# Patient Record
Sex: FEMALE | Race: WHITE | NOT HISPANIC OR LATINO | Employment: FULL TIME | URBAN - METROPOLITAN AREA
[De-identification: names, ages, dates, MRNs, and addresses within clinical notes are randomized per-mention and may not be internally consistent; named-entity substitution may affect disease eponyms.]

---

## 2017-04-10 ENCOUNTER — TRANSCRIBE ORDERS (OUTPATIENT)
Dept: ADMINISTRATIVE | Facility: HOSPITAL | Age: 47
End: 2017-04-10

## 2017-04-10 DIAGNOSIS — Z12.31 ENCOUNTER FOR MAMMOGRAM TO ESTABLISH BASELINE MAMMOGRAM: Primary | ICD-10-CM

## 2017-04-20 ENCOUNTER — HOSPITAL ENCOUNTER (OUTPATIENT)
Dept: RADIOLOGY | Facility: HOSPITAL | Age: 47
Discharge: HOME | End: 2017-04-20
Payer: COMMERCIAL

## 2017-04-20 DIAGNOSIS — Z12.31 ENCOUNTER FOR MAMMOGRAM TO ESTABLISH BASELINE MAMMOGRAM: ICD-10-CM

## 2017-04-20 PROCEDURE — G0202 SCR MAMMO BI INCL CAD: HCPCS

## 2018-10-23 ENCOUNTER — TRANSCRIBE ORDERS (OUTPATIENT)
Dept: ADMINISTRATIVE | Facility: HOSPITAL | Age: 48
End: 2018-10-23

## 2018-10-23 DIAGNOSIS — Z12.39 SCREENING BREAST EXAMINATION: Primary | ICD-10-CM

## 2018-11-01 ENCOUNTER — HOSPITAL ENCOUNTER (OUTPATIENT)
Dept: RADIOLOGY | Facility: HOSPITAL | Age: 48
Discharge: HOME | End: 2018-11-01
Payer: COMMERCIAL

## 2018-11-01 DIAGNOSIS — Z12.39 SCREENING BREAST EXAMINATION: ICD-10-CM

## 2018-11-01 PROCEDURE — 77067 SCR MAMMO BI INCL CAD: CPT

## 2018-11-01 PROCEDURE — 77063 BREAST TOMOSYNTHESIS BI: CPT

## 2019-08-25 ENCOUNTER — APPOINTMENT (EMERGENCY)
Dept: RADIOLOGY | Facility: HOSPITAL | Age: 49
End: 2019-08-25
Attending: EMERGENCY MEDICINE
Payer: COMMERCIAL

## 2019-08-25 ENCOUNTER — APPOINTMENT (EMERGENCY)
Dept: RADIOLOGY | Facility: HOSPITAL | Age: 49
End: 2019-08-25
Payer: COMMERCIAL

## 2019-08-25 ENCOUNTER — HOSPITAL ENCOUNTER (EMERGENCY)
Facility: HOSPITAL | Age: 49
Discharge: HOME | End: 2019-08-25
Attending: EMERGENCY MEDICINE | Admitting: EMERGENCY MEDICINE
Payer: COMMERCIAL

## 2019-08-25 VITALS
RESPIRATION RATE: 18 BRPM | BODY MASS INDEX: 23.95 KG/M2 | HEIGHT: 66 IN | DIASTOLIC BLOOD PRESSURE: 65 MMHG | WEIGHT: 149 LBS | OXYGEN SATURATION: 98 % | TEMPERATURE: 98.7 F | HEART RATE: 100 BPM | SYSTOLIC BLOOD PRESSURE: 143 MMHG

## 2019-08-25 DIAGNOSIS — R10.9 ABDOMINAL WALL PAIN: ICD-10-CM
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
## 2019-08-25 DIAGNOSIS — R07.89 CHEST WALL PAIN: ICD-10-CM

## 2019-08-25 LAB
ALBUMIN SERPL BCP-MCNC: 3.8 G/DL (ref 3.5–5)
ALP SERPL-CCNC: 92 U/L (ref 46–116)
ALT SERPL W P-5'-P-CCNC: 29 U/L (ref 12–78)
ANION GAP SERPL CALCULATED.3IONS-SCNC: 8 MMOL/L (ref 4–13)
APTT PPP: 24 SECONDS (ref 24–33)
AST SERPL W P-5'-P-CCNC: 26 U/L (ref 5–45)
BASOPHILS # BLD AUTO: 0.08 THOUSANDS/ΜL (ref 0–0.1)
BASOPHILS NFR BLD AUTO: 1 % (ref 0–1)
BILIRUB SERPL-MCNC: 0.2 MG/DL (ref 0.2–1)
BUN SERPL-MCNC: 17 MG/DL (ref 5–25)
CALCIUM SERPL-MCNC: 9 MG/DL (ref 8.3–10.1)
CHLORIDE SERPL-SCNC: 102 MMOL/L (ref 100–108)
CO2 SERPL-SCNC: 27 MMOL/L (ref 21–32)
CREAT SERPL-MCNC: 0.71 MG/DL (ref 0.6–1.3)
EOSINOPHIL # BLD AUTO: 0.34 THOUSAND/ΜL (ref 0–0.61)
EOSINOPHIL NFR BLD AUTO: 4 % (ref 0–6)
ERYTHROCYTE [DISTWIDTH] IN BLOOD BY AUTOMATED COUNT: 17 % (ref 11.6–15.1)
GFR SERPL CREATININE-BSD FRML MDRD: 101 ML/MIN/1.73SQ M
GLUCOSE SERPL-MCNC: 99 MG/DL (ref 65–140)
HCT VFR BLD AUTO: 38.5 % (ref 34.8–46.1)
HGB BLD-MCNC: 12 G/DL (ref 11.5–15.4)
IMM GRANULOCYTES # BLD AUTO: 0.03 THOUSAND/UL (ref 0–0.2)
IMM GRANULOCYTES NFR BLD AUTO: 0 % (ref 0–2)
INR PPP: 0.92 (ref 0.86–1.16)
LYMPHOCYTES # BLD AUTO: 3.02 THOUSANDS/ΜL (ref 0.6–4.47)
LYMPHOCYTES NFR BLD AUTO: 32 % (ref 14–44)
MCH RBC QN AUTO: 26.8 PG (ref 26.8–34.3)
MCHC RBC AUTO-ENTMCNC: 31.2 G/DL (ref 31.4–37.4)
MCV RBC AUTO: 86 FL (ref 82–98)
MONOCYTES # BLD AUTO: 0.44 THOUSAND/ΜL (ref 0.17–1.22)
MONOCYTES NFR BLD AUTO: 5 % (ref 4–12)
NEUTROPHILS # BLD AUTO: 5.55 THOUSANDS/ΜL (ref 1.85–7.62)
NEUTS SEG NFR BLD AUTO: 58 % (ref 43–75)
NRBC BLD AUTO-RTO: 0 /100 WBCS
PLATELET # BLD AUTO: 328 THOUSANDS/UL (ref 149–390)
PMV BLD AUTO: 11.2 FL (ref 8.9–12.7)
POTASSIUM SERPL-SCNC: 3.9 MMOL/L (ref 3.5–5.3)
PROT SERPL-MCNC: 7.3 G/DL (ref 6.4–8.2)
PROTHROMBIN TIME: 9.7 SECONDS (ref 9.4–11.7)
RBC # BLD AUTO: 4.48 MILLION/UL (ref 3.81–5.12)
SODIUM SERPL-SCNC: 137 MMOL/L (ref 136–145)
TROPONIN I SERPL-MCNC: <0.02 NG/ML
WBC # BLD AUTO: 9.46 THOUSAND/UL (ref 4.31–10.16)

## 2019-08-25 PROCEDURE — 36415 COLL VENOUS BLD VENIPUNCTURE: CPT | Performed by: EMERGENCY MEDICINE

## 2019-08-25 PROCEDURE — 85730 THROMBOPLASTIN TIME PARTIAL: CPT | Performed by: EMERGENCY MEDICINE

## 2019-08-25 PROCEDURE — 73080 X-RAY EXAM OF ELBOW: CPT

## 2019-08-25 PROCEDURE — 85025 COMPLETE CBC W/AUTO DIFF WBC: CPT | Performed by: EMERGENCY MEDICINE

## 2019-08-25 PROCEDURE — 80053 COMPREHEN METABOLIC PANEL: CPT | Performed by: EMERGENCY MEDICINE

## 2019-08-25 PROCEDURE — 74177 CT ABD & PELVIS W/CONTRAST: CPT

## 2019-08-25 PROCEDURE — 96374 THER/PROPH/DIAG INJ IV PUSH: CPT

## 2019-08-25 PROCEDURE — 93005 ELECTROCARDIOGRAM TRACING: CPT

## 2019-08-25 PROCEDURE — 99285 EMERGENCY DEPT VISIT HI MDM: CPT

## 2019-08-25 PROCEDURE — 85610 PROTHROMBIN TIME: CPT | Performed by: EMERGENCY MEDICINE

## 2019-08-25 PROCEDURE — 96375 TX/PRO/DX INJ NEW DRUG ADDON: CPT

## 2019-08-25 PROCEDURE — 84484 ASSAY OF TROPONIN QUANT: CPT | Performed by: EMERGENCY MEDICINE

## 2019-08-25 PROCEDURE — 71260 CT THORAX DX C+: CPT

## 2019-08-25 RX ORDER — NAPROXEN 500 MG/1
500 TABLET ORAL 2 TIMES DAILY WITH MEALS
Qty: 10 TABLET | Refills: 0 | Status: SHIPPED | OUTPATIENT
Start: 2019-08-25 | End: 2019-09-02

## 2019-08-25 RX ORDER — KETOROLAC TROMETHAMINE 30 MG/ML
30 INJECTION, SOLUTION INTRAMUSCULAR; INTRAVENOUS ONCE
Status: COMPLETED | OUTPATIENT
Start: 2019-08-25 | End: 2019-08-25

## 2019-08-25 RX ORDER — CYCLOBENZAPRINE HCL 10 MG
10 TABLET ORAL ONCE
Status: COMPLETED | OUTPATIENT
Start: 2019-08-25 | End: 2019-08-25

## 2019-08-25 RX ORDER — CYCLOBENZAPRINE HCL 10 MG
10 TABLET ORAL 2 TIMES DAILY PRN
Qty: 10 TABLET | Refills: 0 | Status: SHIPPED | OUTPATIENT
Start: 2019-08-25

## 2019-08-25 RX ORDER — ONDANSETRON 2 MG/ML
4 INJECTION INTRAMUSCULAR; INTRAVENOUS ONCE
Status: COMPLETED | OUTPATIENT
Start: 2019-08-25 | End: 2019-08-25

## 2019-08-25 RX ADMIN — MORPHINE SULFATE 2 MG: 2 INJECTION, SOLUTION INTRAMUSCULAR; INTRAVENOUS at 20:21

## 2019-08-25 RX ADMIN — KETOROLAC TROMETHAMINE 30 MG: 30 INJECTION, SOLUTION INTRAMUSCULAR at 21:28

## 2019-08-25 RX ADMIN — ONDANSETRON 4 MG: 2 INJECTION INTRAMUSCULAR; INTRAVENOUS at 20:47

## 2019-08-25 RX ADMIN — IOHEXOL 100 ML: 350 INJECTION, SOLUTION INTRAVENOUS at 20:40

## 2019-08-25 RX ADMIN — CYCLOBENZAPRINE HYDROCHLORIDE 10 MG: 10 TABLET, FILM COATED ORAL at 21:29
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
## 2019-08-26 LAB
ATRIAL RATE: 94 BPM
P AXIS: 57 DEGREES
PR INTERVAL: 118 MS
QRS AXIS: 78 DEGREES
QRSD INTERVAL: 70 MS
QT INTERVAL: 340 MS
QTC INTERVAL: 425 MS
T WAVE AXIS: 74 DEGREES
VENTRICULAR RATE: 94 BPM

## 2019-08-26 PROCEDURE — 93010 ELECTROCARDIOGRAM REPORT: CPT | Performed by: INTERNAL MEDICINE

## 2019-08-26 NOTE — ED NOTES
Patient transported to CT  Will give Zofran when patient returns from scan       Charlie Monique RN  08/25/19 2024

## 2019-09-02 ENCOUNTER — APPOINTMENT (EMERGENCY)
Dept: RADIOLOGY | Facility: HOSPITAL | Age: 49
End: 2019-09-02
Payer: COMMERCIAL

## 2019-09-02 ENCOUNTER — HOSPITAL ENCOUNTER (EMERGENCY)
Facility: HOSPITAL | Age: 49
Discharge: HOME | End: 2019-09-02
Attending: EMERGENCY MEDICINE | Admitting: EMERGENCY MEDICINE
Payer: COMMERCIAL

## 2019-09-02 VITALS
RESPIRATION RATE: 18 BRPM | TEMPERATURE: 98.1 F | OXYGEN SATURATION: 100 % | SYSTOLIC BLOOD PRESSURE: 104 MMHG | HEART RATE: 71 BPM | WEIGHT: 161 LBS | BODY MASS INDEX: 25.99 KG/M2 | DIASTOLIC BLOOD PRESSURE: 72 MMHG

## 2019-09-02 DIAGNOSIS — R42 LIGHTHEADEDNESS: Primary | ICD-10-CM

## 2019-09-02 DIAGNOSIS — S30.1XXA CONTUSION OF ABDOMINAL WALL, INITIAL ENCOUNTER: ICD-10-CM

## 2019-09-02 DIAGNOSIS — S06.0X9A CONCUSSION: ICD-10-CM

## 2019-09-02 LAB
ALBUMIN SERPL BCP-MCNC: 3.4 G/DL (ref 3.5–5)
ALP SERPL-CCNC: 81 U/L (ref 46–116)
ALT SERPL W P-5'-P-CCNC: 18 U/L (ref 12–78)
ANION GAP SERPL CALCULATED.3IONS-SCNC: 7 MMOL/L (ref 4–13)
APTT PPP: 25 SECONDS (ref 23–37)
AST SERPL W P-5'-P-CCNC: 15 U/L (ref 5–45)
BASOPHILS # BLD AUTO: 0.08 THOUSANDS/ΜL (ref 0–0.1)
BASOPHILS NFR BLD AUTO: 1 % (ref 0–1)
BILIRUB SERPL-MCNC: 0.2 MG/DL (ref 0.2–1)
BUN SERPL-MCNC: 14 MG/DL (ref 5–25)
CALCIUM SERPL-MCNC: 8.5 MG/DL (ref 8.3–10.1)
CHLORIDE SERPL-SCNC: 106 MMOL/L (ref 100–108)
CO2 SERPL-SCNC: 28 MMOL/L (ref 21–32)
CREAT SERPL-MCNC: 0.74 MG/DL (ref 0.6–1.3)
EOSINOPHIL # BLD AUTO: 0.43 THOUSAND/ΜL (ref 0–0.61)
EOSINOPHIL NFR BLD AUTO: 7 % (ref 0–6)
ERYTHROCYTE [DISTWIDTH] IN BLOOD BY AUTOMATED COUNT: 16.6 % (ref 11.6–15.1)
GFR SERPL CREATININE-BSD FRML MDRD: 96 ML/MIN/1.73SQ M
GLUCOSE SERPL-MCNC: 96 MG/DL (ref 65–140)
HCT VFR BLD AUTO: 34.6 % (ref 34.8–46.1)
HGB BLD-MCNC: 10.5 G/DL (ref 11.5–15.4)
IMM GRANULOCYTES # BLD AUTO: 0.02 THOUSAND/UL (ref 0–0.2)
IMM GRANULOCYTES NFR BLD AUTO: 0 % (ref 0–2)
INR PPP: 0.92 (ref 0.91–1.09)
LIPASE SERPL-CCNC: 242 U/L (ref 73–393)
LYMPHOCYTES # BLD AUTO: 2 THOUSANDS/ΜL (ref 0.6–4.47)
LYMPHOCYTES NFR BLD AUTO: 34 % (ref 14–44)
MCH RBC QN AUTO: 26.6 PG (ref 26.8–34.3)
MCHC RBC AUTO-ENTMCNC: 30.3 G/DL (ref 31.4–37.4)
MCV RBC AUTO: 88 FL (ref 82–98)
MONOCYTES # BLD AUTO: 0.31 THOUSAND/ΜL (ref 0.17–1.22)
MONOCYTES NFR BLD AUTO: 5 % (ref 4–12)
NEUTROPHILS # BLD AUTO: 2.98 THOUSANDS/ΜL (ref 1.85–7.62)
NEUTS SEG NFR BLD AUTO: 53 % (ref 43–75)
NRBC BLD AUTO-RTO: 0 /100 WBCS
PLATELET # BLD AUTO: 278 THOUSANDS/UL (ref 149–390)
PMV BLD AUTO: 10.2 FL (ref 8.9–12.7)
POTASSIUM SERPL-SCNC: 4.6 MMOL/L (ref 3.5–5.3)
PROT SERPL-MCNC: 6.5 G/DL (ref 6.4–8.2)
PROTHROMBIN TIME: 9.9 SECONDS (ref 9.8–12)
RBC # BLD AUTO: 3.95 MILLION/UL (ref 3.81–5.12)
SODIUM SERPL-SCNC: 141 MMOL/L (ref 136–145)
WBC # BLD AUTO: 5.82 THOUSAND/UL (ref 4.31–10.16)

## 2019-09-02 PROCEDURE — 85610 PROTHROMBIN TIME: CPT | Performed by: EMERGENCY MEDICINE

## 2019-09-02 PROCEDURE — 96374 THER/PROPH/DIAG INJ IV PUSH: CPT

## 2019-09-02 PROCEDURE — 74177 CT ABD & PELVIS W/CONTRAST: CPT

## 2019-09-02 PROCEDURE — 70498 CT ANGIOGRAPHY NECK: CPT

## 2019-09-02 PROCEDURE — 36415 COLL VENOUS BLD VENIPUNCTURE: CPT | Performed by: EMERGENCY MEDICINE

## 2019-09-02 PROCEDURE — 96361 HYDRATE IV INFUSION ADD-ON: CPT

## 2019-09-02 PROCEDURE — 85025 COMPLETE CBC W/AUTO DIFF WBC: CPT | Performed by: EMERGENCY MEDICINE

## 2019-09-02 PROCEDURE — 70496 CT ANGIOGRAPHY HEAD: CPT

## 2019-09-02 PROCEDURE — 83690 ASSAY OF LIPASE: CPT | Performed by: EMERGENCY MEDICINE

## 2019-09-02 PROCEDURE — 85730 THROMBOPLASTIN TIME PARTIAL: CPT | Performed by: EMERGENCY MEDICINE

## 2019-09-02 PROCEDURE — 99284 EMERGENCY DEPT VISIT MOD MDM: CPT

## 2019-09-02 PROCEDURE — 80053 COMPREHEN METABOLIC PANEL: CPT | Performed by: EMERGENCY MEDICINE

## 2019-09-02 RX ORDER — ONDANSETRON 4 MG/1
4 TABLET, FILM COATED ORAL EVERY 6 HOURS
Qty: 9 TABLET | Refills: 0 | Status: SHIPPED | OUTPATIENT
Start: 2019-09-02 | End: 2019-09-05

## 2019-09-02 RX ORDER — HYDROCODONE BITARTRATE AND ACETAMINOPHEN 5; 325 MG/1; MG/1
1 TABLET ORAL EVERY 6 HOURS PRN
Qty: 12 TABLET | Refills: 0 | Status: SHIPPED | OUTPATIENT
Start: 2019-09-02 | End: 2019-09-05

## 2019-09-02 RX ORDER — MORPHINE SULFATE 4 MG/ML
4 INJECTION, SOLUTION INTRAMUSCULAR; INTRAVENOUS ONCE
Status: COMPLETED | OUTPATIENT
Start: 2019-09-02 | End: 2019-09-02

## 2019-09-02 RX ADMIN — IOHEXOL 100 ML: 350 INJECTION, SOLUTION INTRAVENOUS at 12:32

## 2019-09-02 RX ADMIN — SODIUM CHLORIDE 1000 ML: 0.9 INJECTION, SOLUTION INTRAVENOUS at 13:23

## 2019-09-02 RX ADMIN — MORPHINE SULFATE 4 MG: 4 INJECTION INTRAVENOUS at 12:01
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
## 2019-09-02 NOTE — ED NOTES
Pt awake, alert and oriented, ambulatory with steady gait, advised to change positions slowly to prevent lightheadedness     Matty Hernandez, DAQUAN  09/02/19 6544
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
## 2019-09-02 NOTE — DISCHARGE INSTRUCTIONS
Please to not return to full physical activity until your lightheadedness headache nausea have completely resolved  Please apply ice packs to the affected areas of contusion on your abdomen followed by warm compresses  Please do not lift anything heavy over 5 lb to give you abdominal wall muscle some rest   Please get a repeat CBC in 2 days to make sure your hemoglobin is not dropping  Please follow up with the of family doctor in a week  Please do not drive or operate heavy machinery while on Vicodin and do not take any more Tylenol as Vicodin already has Tylenol in it  Please alternate Vicodin and Flexeril

## 2019-09-02 NOTE — ED NOTES
Dr Kj Martines at bedside, results discussed with pt and family     Eliu Soto, DAQUAN  09/02/19 9794

## 2019-11-24 ENCOUNTER — OFFICE VISIT (OUTPATIENT)
Dept: URGENT CARE | Facility: CLINIC | Age: 49
End: 2019-11-24
Payer: COMMERCIAL

## 2019-11-24 VITALS
WEIGHT: 155 LBS | HEART RATE: 74 BPM | BODY MASS INDEX: 25.02 KG/M2 | DIASTOLIC BLOOD PRESSURE: 68 MMHG | OXYGEN SATURATION: 100 % | RESPIRATION RATE: 14 BRPM | SYSTOLIC BLOOD PRESSURE: 104 MMHG | TEMPERATURE: 97.6 F

## 2019-11-24 DIAGNOSIS — R22.0 FACIAL SWELLING: ICD-10-CM

## 2019-11-24 DIAGNOSIS — T78.40XA ALLERGIC REACTION, INITIAL ENCOUNTER: Primary | ICD-10-CM

## 2019-11-24 PROCEDURE — 99203 OFFICE O/P NEW LOW 30 MIN: CPT | Performed by: NURSE PRACTITIONER

## 2019-11-24 RX ORDER — PREDNISONE 20 MG/1
20 TABLET ORAL 2 TIMES DAILY WITH MEALS
Qty: 10 TABLET | Refills: 0 | Status: SHIPPED | OUTPATIENT
Start: 2019-11-24 | End: 2019-11-29

## 2019-11-24 RX ORDER — METHYLPREDNISOLONE SODIUM SUCCINATE 40 MG/ML
40 INJECTION, POWDER, LYOPHILIZED, FOR SOLUTION INTRAMUSCULAR; INTRAVENOUS ONCE
Status: COMPLETED | OUTPATIENT
Start: 2019-11-24 | End: 2019-11-24

## 2019-11-24 RX ADMIN — METHYLPREDNISOLONE SODIUM SUCCINATE 40 MG: 40 INJECTION, POWDER, LYOPHILIZED, FOR SOLUTION INTRAMUSCULAR; INTRAVENOUS at 10:41

## 2019-11-24 NOTE — PROGRESS NOTES
[facility]        NAME: Marilee Hagen is a 50 y o  female  : 1970    MRN: [id number]  DATE: 2019  TIME: 10:28 AM    Assessment and Plan     1  Allergic reaction, initial encounter  - methylPREDNISolone sodium succinate (Solu-MEDROL) injection 40 mg  - predniSONE 20 mg tablet; Take 1 tablet (20 mg total) by mouth 2 (two) times a day with meals for 5 days  Dispense: 10 tablet; Refill: 0    2  Facial swelling  - methylPREDNISolone sodium succinate (Solu-MEDROL) injection 40 mg  - predniSONE 20 mg tablet; Take 1 tablet (20 mg total) by mouth 2 (two) times a day with meals for 5 days  Dispense: 10 tablet; Refill: 0        Patient Instructions     You were given Solumedrol 40mg today in the clinic  Tomorrow, start Prednisone 20mg twice daily with food for next 5 days  Pepcid 20mg (over the counter) daily for 5 days  Benadryl as needed  Do not drive or operate machinery while taking this medication  Over the counter Claritin or Zyrtec daily for next 5 days  If you develop shortness of breath, chest tightness, wheeze, or throat closing sensation- go immediately to Emergency room  Follow up with PCP in 3-5 days        Chief Complaint     Chief Complaint   Patient presents with    Swelling     facial swelling that started on Friday, started with her lips, took claritan with some relief; tried CBD oil around eyes, which caused swelling to worsen; took a claritin some releif; swelling of unknown etiology         History of Present Illness       Here for facial swelling  3 days  Getting worse  Unsure what she is having a reaction to  Is allergic to ginger and does not know if something she ate had ginger in it  Lips swollen and itchy  Entire face is swollen but right side is worse  Right eye swollen  No discharge from eye  No sob  No chest tightness  No wheeze  Took claritin and it helped a little  No new meds  No new skin products           Review of Systems   Review of Systems   HENT: Positive for facial swelling  Negative for congestion  Eyes: Negative for discharge, redness and visual disturbance  Right eyelid and under eye swollen   Respiratory: Negative for chest tightness, shortness of breath and wheezing  Cardiovascular: Negative for chest pain and palpitations  Allergic/Immunologic: Positive for food allergies  Neurological: Negative for dizziness and headaches  Current Medications       Current Outpatient Medications:     cyclobenzaprine (FLEXERIL) 10 mg tablet, Take 1 tablet (10 mg total) by mouth 2 (two) times a day as needed for muscle spasms (Patient not taking: Reported on 11/24/2019), Disp: 10 tablet, Rfl: 0    ondansetron (ZOFRAN) 4 mg tablet, Take 1 tablet (4 mg total) by mouth every 6 (six) hours for 3 days, Disp: 9 tablet, Rfl: 0    Current Allergies     Allergies as of 11/24/2019    (No Known Allergies)            The following portions of the patient's history were reviewed and updated as appropriate: allergies, current medications, past family history, past medical history, past social history, past surgical history and problem list      Past Medical History:   Diagnosis Date    Patient denies medical problems        Past Surgical History:   Procedure Laterality Date    CHOLECYSTECTOMY      TONSILLECTOMY         Family History   Adopted: Yes         Medications have been verified  Objective   /68   Pulse 74   Temp 97 6 °F (36 4 °C)   Resp 14   Wt 70 3 kg (155 lb)   LMP 11/18/2019   SpO2 100%   BMI 25 02 kg/m²        Physical Exam     Physical Exam   Constitutional: She appears well-developed and well-nourished  HENT:   Mouth/Throat: Oropharynx is clear and moist    Mild erythema to face, worse on right  Lips with mild erythema and scant edema  Eyes:   Right meagan-orbital edema  Cardiovascular: Normal rate  Pulmonary/Chest: Effort normal and breath sounds normal  She has no wheezes  Neurological: She is alert  Skin: Skin is warm  There is erythema

## 2019-11-24 NOTE — PATIENT INSTRUCTIONS
You were given Solumedrol 40mg today in the clinic  Tomorrow, start Prednisone 20mg twice daily with food for next 5 days  Pepcid 20mg (over the counter) daily for 5 days  Benadryl as needed  Do not drive or operate machinery while taking this medication  Over the counter Claritin or Zyrtec daily for next 5 days  If you develop shortness of breath, chest tightness, wheeze, or throat closing sensation- go immediately to Emergency room     Follow up with PCP in 3-5 days

## 2020-01-11 ENCOUNTER — APPOINTMENT (EMERGENCY)
Dept: RADIOLOGY | Facility: HOSPITAL | Age: 50
End: 2020-01-11
Payer: COMMERCIAL

## 2020-01-11 ENCOUNTER — HOSPITAL ENCOUNTER (EMERGENCY)
Facility: HOSPITAL | Age: 50
Discharge: HOME | End: 2020-01-11
Attending: EMERGENCY MEDICINE | Admitting: EMERGENCY MEDICINE
Payer: COMMERCIAL

## 2020-01-11 VITALS
SYSTOLIC BLOOD PRESSURE: 111 MMHG | TEMPERATURE: 98.3 F | RESPIRATION RATE: 18 BRPM | HEART RATE: 70 BPM | WEIGHT: 148 LBS | BODY MASS INDEX: 23.78 KG/M2 | HEIGHT: 66 IN | OXYGEN SATURATION: 99 % | DIASTOLIC BLOOD PRESSURE: 54 MMHG

## 2020-01-11 DIAGNOSIS — N94.6 DYSMENORRHEA: ICD-10-CM

## 2020-01-11 LAB
ALBUMIN SERPL BCP-MCNC: 4.1 G/DL (ref 3.5–5)
ALP SERPL-CCNC: 81 U/L (ref 46–116)
ALT SERPL W P-5'-P-CCNC: 23 U/L (ref 12–78)
AMORPH URATE CRY URNS QL MICRO: ABNORMAL /HPF
ANION GAP SERPL CALCULATED.3IONS-SCNC: 9 MMOL/L (ref 4–13)
AST SERPL W P-5'-P-CCNC: 17 U/L (ref 5–45)
BACTERIA UR QL AUTO: ABNORMAL /HPF
BASOPHILS # BLD AUTO: 0.06 THOUSANDS/ΜL (ref 0–0.1)
BASOPHILS NFR BLD AUTO: 1 % (ref 0–1)
BILIRUB SERPL-MCNC: 0.2 MG/DL (ref 0.2–1)
BILIRUB UR QL STRIP: ABNORMAL
BUN SERPL-MCNC: 13 MG/DL (ref 5–25)
CALCIUM SERPL-MCNC: 8.2 MG/DL (ref 8.3–10.1)
CHLORIDE SERPL-SCNC: 107 MMOL/L (ref 100–108)
CLARITY UR: ABNORMAL
CO2 SERPL-SCNC: 25 MMOL/L (ref 21–32)
COLOR UR: YELLOW
CREAT SERPL-MCNC: 0.83 MG/DL (ref 0.6–1.3)
CRYSTALS URNS QL MICRO: ABNORMAL /HPF
EOSINOPHIL # BLD AUTO: 0.06 THOUSAND/ΜL (ref 0–0.61)
EOSINOPHIL NFR BLD AUTO: 1 % (ref 0–6)
ERYTHROCYTE [DISTWIDTH] IN BLOOD BY AUTOMATED COUNT: 16.8 % (ref 11.6–15.1)
EXT PREG TEST URINE: NEGATIVE
EXT. CONTROL ED NAV: NORMAL
GFR SERPL CREATININE-BSD FRML MDRD: 83 ML/MIN/1.73SQ M
GLUCOSE SERPL-MCNC: 98 MG/DL (ref 65–140)
GLUCOSE UR STRIP-MCNC: NEGATIVE MG/DL
HCT VFR BLD AUTO: 33.4 % (ref 34.8–46.1)
HGB BLD-MCNC: 10.2 G/DL (ref 11.5–15.4)
HGB UR QL STRIP.AUTO: ABNORMAL
IMM GRANULOCYTES # BLD AUTO: 0.03 THOUSAND/UL (ref 0–0.2)
IMM GRANULOCYTES NFR BLD AUTO: 0 % (ref 0–2)
KETONES UR STRIP-MCNC: ABNORMAL MG/DL
LEUKOCYTE ESTERASE UR QL STRIP: NEGATIVE
LYMPHOCYTES # BLD AUTO: 0.86 THOUSANDS/ΜL (ref 0.6–4.47)
LYMPHOCYTES NFR BLD AUTO: 10 % (ref 14–44)
MCH RBC QN AUTO: 25 PG (ref 26.8–34.3)
MCHC RBC AUTO-ENTMCNC: 30.5 G/DL (ref 31.4–37.4)
MCV RBC AUTO: 82 FL (ref 82–98)
MONOCYTES # BLD AUTO: 0.25 THOUSAND/ΜL (ref 0.17–1.22)
MONOCYTES NFR BLD AUTO: 3 % (ref 4–12)
MUCOUS THREADS UR QL AUTO: ABNORMAL
NEUTROPHILS # BLD AUTO: 7.37 THOUSANDS/ΜL (ref 1.85–7.62)
NEUTS SEG NFR BLD AUTO: 85 % (ref 43–75)
NITRITE UR QL STRIP: NEGATIVE
NON-SQ EPI CELLS URNS QL MICRO: ABNORMAL /HPF
NRBC BLD AUTO-RTO: 0 /100 WBCS
PH UR STRIP.AUTO: 6 [PH]
PLATELET # BLD AUTO: 298 THOUSANDS/UL (ref 149–390)
PMV BLD AUTO: 10.1 FL (ref 8.9–12.7)
POTASSIUM SERPL-SCNC: 4.7 MMOL/L (ref 3.5–5.3)
PROT SERPL-MCNC: 7.4 G/DL (ref 6.4–8.2)
PROT UR STRIP-MCNC: ABNORMAL MG/DL
RBC # BLD AUTO: 4.08 MILLION/UL (ref 3.81–5.12)
RBC #/AREA URNS AUTO: ABNORMAL /HPF
SODIUM SERPL-SCNC: 141 MMOL/L (ref 136–145)
SP GR UR STRIP.AUTO: 1.02 (ref 1–1.03)
UROBILINOGEN UR QL STRIP.AUTO: 0.2 E.U./DL
WBC # BLD AUTO: 8.63 THOUSAND/UL (ref 4.31–10.16)
WBC #/AREA URNS AUTO: ABNORMAL /HPF

## 2020-01-11 PROCEDURE — 96374 THER/PROPH/DIAG INJ IV PUSH: CPT

## 2020-01-11 PROCEDURE — 81001 URINALYSIS AUTO W/SCOPE: CPT | Performed by: EMERGENCY MEDICINE

## 2020-01-11 PROCEDURE — 74177 CT ABD & PELVIS W/CONTRAST: CPT

## 2020-01-11 PROCEDURE — 99284 EMERGENCY DEPT VISIT MOD MDM: CPT

## 2020-01-11 PROCEDURE — 80053 COMPREHEN METABOLIC PANEL: CPT | Performed by: EMERGENCY MEDICINE

## 2020-01-11 PROCEDURE — 85025 COMPLETE CBC W/AUTO DIFF WBC: CPT | Performed by: EMERGENCY MEDICINE

## 2020-01-11 PROCEDURE — 96361 HYDRATE IV INFUSION ADD-ON: CPT

## 2020-01-11 PROCEDURE — 81025 URINE PREGNANCY TEST: CPT | Performed by: EMERGENCY MEDICINE

## 2020-01-11 PROCEDURE — 36415 COLL VENOUS BLD VENIPUNCTURE: CPT | Performed by: EMERGENCY MEDICINE

## 2020-01-11 PROCEDURE — 99284 EMERGENCY DEPT VISIT MOD MDM: CPT | Performed by: EMERGENCY MEDICINE

## 2020-01-11 RX ORDER — KETOROLAC TROMETHAMINE 30 MG/ML
30 INJECTION, SOLUTION INTRAMUSCULAR; INTRAVENOUS ONCE
Status: COMPLETED | OUTPATIENT
Start: 2020-01-11 | End: 2020-01-11

## 2020-01-11 RX ORDER — IBUPROFEN 600 MG/1
600 TABLET ORAL EVERY 6 HOURS PRN
Qty: 30 TABLET | Refills: 0 | Status: SHIPPED | OUTPATIENT
Start: 2020-01-11

## 2020-01-11 RX ORDER — IBUPROFEN 600 MG/1
600 TABLET ORAL EVERY 6 HOURS PRN
Qty: 30 TABLET | Refills: 0 | Status: SHIPPED | OUTPATIENT
Start: 2020-01-11 | End: 2020-01-11 | Stop reason: SDUPTHER

## 2020-01-11 RX ADMIN — KETOROLAC TROMETHAMINE 30 MG: 30 INJECTION, SOLUTION INTRAMUSCULAR at 10:23

## 2020-01-11 RX ADMIN — IOHEXOL 100 ML: 350 INJECTION, SOLUTION INTRAVENOUS at 11:28

## 2020-01-11 RX ADMIN — SODIUM CHLORIDE 1000 ML: 0.9 INJECTION, SOLUTION INTRAVENOUS at 10:24

## 2020-01-11 NOTE — ED PROVIDER NOTES
History  Chief Complaint   Patient presents with    Menstrual Problem     C/O severe menstrual cramping starting at 6 am  Patient reports she got her period on time yesterday  Reports she normally has cramping "but never had them like this " Reports "My cycle has been regular my entire life "      Patient is a 49-year-old female who presents with complaint of starting her menstrual period last night and then having severe cramps throughout the night  Patient states this is her normal time for , she is not bleeding excessively, she has no other vaginal discharge  Patient states she took 200 mg of Motrin last night with no relief  Patient states she has had cramps in the past but never as severe as this  Patient states the pain was so bad she had vomited x1 last night there is no nausea now  Patient denies any dysuria, no frequency or urgency  Patient denies any aggravating or alleviating factors  Prior to Admission Medications   Prescriptions Last Dose Informant Patient Reported? Taking? cyclobenzaprine (FLEXERIL) 10 mg tablet   No No   Sig: Take 1 tablet (10 mg total) by mouth 2 (two) times a day as needed for muscle spasms   Patient not taking: Reported on 11/24/2019   ondansetron (ZOFRAN) 4 mg tablet   No No   Sig: Take 1 tablet (4 mg total) by mouth every 6 (six) hours for 3 days      Facility-Administered Medications: None       Past Medical History:   Diagnosis Date    Patient denies medical problems        Past Surgical History:   Procedure Laterality Date    CHOLECYSTECTOMY      TONSILLECTOMY         Family History   Adopted: Yes     I have reviewed and agree with the history as documented      Social History     Tobacco Use    Smoking status: Current Some Day Smoker    Smokeless tobacco: Never Used    Tobacco comment: 1 pack a week   Substance Use Topics    Alcohol use: Yes     Frequency: 2-3 times a week     Drinks per session: 3 or 4     Binge frequency: Less than monthly Comment: weekends    Drug use: Yes     Types: Marijuana     Comment: occasionally        Review of Systems   Constitutional: Negative for chills and fever  HENT: Negative for facial swelling and trouble swallowing  Respiratory: Negative for chest tightness and shortness of breath  Cardiovascular: Negative for chest pain and leg swelling  Gastrointestinal: Positive for abdominal pain, nausea and vomiting  Genitourinary: Positive for pelvic pain and vaginal bleeding  Negative for dysuria, flank pain, vaginal discharge and vaginal pain  Musculoskeletal: Negative for back pain and neck pain  Skin: Negative  Neurological: Negative for weakness and numbness  Hematological: Negative  Psychiatric/Behavioral: Negative  Physical Exam  Physical Exam   Constitutional: She is oriented to person, place, and time  She appears well-developed and well-nourished  HENT:   Head: Normocephalic and atraumatic  Neck: Normal range of motion  Cardiovascular: Normal rate and regular rhythm  Pulmonary/Chest: Effort normal and breath sounds normal    Abdominal: Soft  Normal appearance and bowel sounds are normal  There is tenderness in the periumbilical area  There is no rigidity, no rebound and no guarding  Musculoskeletal: Normal range of motion  Neurological: She is alert and oriented to person, place, and time  Skin: Skin is warm and dry  Psychiatric: She has a normal mood and affect  Nursing note and vitals reviewed        Vital Signs  ED Triage Vitals [01/11/20 0954]   Temperature Pulse Respirations Blood Pressure SpO2   (!) 97 °F (36 1 °C) 79 22 150/99 100 %      Temp Source Heart Rate Source Patient Position - Orthostatic VS BP Location FiO2 (%)   Oral Monitor Lying Right arm --      Pain Score       Worst Possible Pain           Vitals:    01/11/20 0954   BP: 150/99   Pulse: 79   Patient Position - Orthostatic VS: Lying         Visual Acuity      ED Medications  Medications   sodium chloride 0 9 % bolus 1,000 mL (1,000 mL Intravenous New Bag 1/11/20 1024)   ketorolac (TORADOL) injection 30 mg (30 mg Intravenous Given 1/11/20 1023)   iohexol (OMNIPAQUE) 350 MG/ML injection (MULTI-DOSE) 100 mL (100 mL Intravenous Given 1/11/20 1128)       Diagnostic Studies  Results Reviewed     Procedure Component Value Units Date/Time    Urine Microscopic [[id number]]  (Abnormal) Collected:  01/11/20 1014    Lab Status:  Final result Specimen:  Urine, Clean Catch Updated:  01/11/20 1059     RBC, UA None Seen /hpf      WBC, UA 0-1 /hpf      Epithelial Cells Occasional /hpf      Bacteria, UA       Field obscured, unable to enumerate     /hpf     AMORPH URATES Moderate /hpf      OTHER CRYSTALS Moderate /hpf      MUCUS THREADS Moderate    Narrative:       Unspecified crystals seen; less than 1ml of urine collected    Notified Jennie/BO    Comprehensive metabolic panel [[id number]]  (Abnormal) Collected:  01/11/20 1025    Lab Status:  Final result Specimen:  Blood from Arm, Left Updated:  01/11/20 1050     Sodium 141 mmol/L      Potassium 4 7 mmol/L      Chloride 107 mmol/L      CO2 25 mmol/L      ANION GAP 9 mmol/L      BUN 13 mg/dL      Creatinine 0 83 mg/dL      Glucose 98 mg/dL      Calcium 8 2 mg/dL      AST 17 U/L      ALT 23 U/L      Alkaline Phosphatase 81 U/L      Total Protein 7 4 g/dL      Albumin 4 1 g/dL      Total Bilirubin 0 20 mg/dL      eGFR 83 ml/min/1 73sq m     Narrative:       [place] guidelines for Chronic Kidney Disease (CKD):     Stage 1 with normal or high GFR (GFR > 90 mL/min/1 73 square meters)    Stage 2 Mild CKD (GFR = 60-89 mL/min/1 73 square meters)    Stage 3A Moderate CKD (GFR = 45-59 mL/min/1 73 square meters)    Stage 3B Moderate CKD (GFR = 30-44 mL/min/1 73 square meters)    Stage 4 Severe CKD (GFR = 15-29 mL/min/1 73 square meters)    Stage 5 End Stage CKD (GFR <15 mL/min/1 73 square meters)  Note: GFR calculation is accurate only with a steady state creatinine    CBC and differential [[id number]]  (Abnormal) Collected:  01/11/20 1025    Lab Status:  Final result Specimen:  Blood from Arm, Left Updated:  01/11/20 1032     WBC 8 63 Thousand/uL      RBC 4 08 Million/uL      Hemoglobin 10 2 g/dL      Hematocrit 33 4 %      MCV 82 fL      MCH 25 0 pg      MCHC 30 5 g/dL      RDW 16 8 %      MPV 10 1 fL      Platelets 285 Thousands/uL      nRBC 0 /100 WBCs      Neutrophils Relative 85 %      Immat GRANS % 0 %      Lymphocytes Relative 10 %      Monocytes Relative 3 %      Eosinophils Relative 1 %      Basophils Relative 1 %      Neutrophils Absolute 7 37 Thousands/µL      Immature Grans Absolute 0 03 Thousand/uL      Lymphocytes Absolute 0 86 Thousands/µL      Monocytes Absolute 0 25 Thousand/µL      Eosinophils Absolute 0 06 Thousand/µL      Basophils Absolute 0 06 Thousands/µL     UA (URINE) with reflex to Scope [[id number]]  (Abnormal) Collected:  01/11/20 1014    Lab Status:  Final result Specimen:  Urine, Clean Catch Updated:  01/11/20 1020     Color, UA Yellow     Clarity, UA Cloudy     Specific Gravity, UA 1 025     pH, UA 6 0     Leukocytes, UA Negative     Nitrite, UA Negative     Protein, UA Trace mg/dl      Glucose, UA Negative mg/dl      Ketones, UA Trace mg/dl      Urobilinogen, UA 0 2 E U /dl      Bilirubin, UA Interference- unable to analyze     Blood, UA Trace-Intact    POCT pregnancy, urine [[id number]]  (Normal) Resulted:  01/11/20 1012    Lab Status:  Final result Updated:  01/11/20 1012     EXT PREG TEST UR (Ref: Negative) negative     Control valid                 CT abdomen pelvis with contrast   Final Result by Clarisa Gruber MD (01/11 1141)   1  No acute intra-abdominal pathology                    Workstation performed: [id number]                    Procedures  Procedures         ED Course                               MDM  Number of Diagnoses or Management Options  Dysmenorrhea:   Diagnosis management comments: Patient vastly improved with Toradol  Her blood work and CT scan showed no abnormalities  I answered all questions best my ability, the patient is going to follow up with her OBGYN for further evaluation  The patient her  verbalized understanding and are in agreement with the assessment and plan  Amount and/or Complexity of Data Reviewed  Clinical lab tests: ordered and reviewed  Tests in the radiology section of CPT®: ordered and reviewed          Disposition  Final diagnoses:   Dysmenorrhea     Time reflects when diagnosis was documented in both MDM as applicable and the Disposition within this note     Time User Action Codes Description Comment    1/11/2020 12:03 PM Jenny Welch Add [N94 6] Dysmenorrhea     1/11/2020 12:03 PM Jenny Welch Modify [N94 6] Dysmenorrhea       ED Disposition     ED Disposition Condition Date/Time Comment    Discharge Stable Sat Jan 11, 2020 12:03 PM Sonam Beavers discharge to home/self care  Follow-up Information     Follow up With Specialties Details Why Contact Info    Alessandro Klein MD   As needed [facility]  [phone]            Patient's Medications   Discharge Prescriptions    IBUPROFEN (MOTRIN) 600 MG TABLET    Take 1 tablet (600 mg total) by mouth every 6 (six) hours as needed for mild pain       Start Date: 1/11/2020 End Date: --       Order Dose: 600 mg       Quantity: 30 tablet    Refills: 0     No discharge procedures on file      ED Provider  Electronically Signed by           James Alba MD  01/11/20 5155

## 2020-01-28 ENCOUNTER — TRANSCRIBE ORDERS (OUTPATIENT)
Dept: ADMINISTRATIVE | Facility: HOSPITAL | Age: 50
End: 2020-01-28

## 2020-01-28 DIAGNOSIS — Z12.31 VISIT FOR SCREENING MAMMOGRAM: Primary | ICD-10-CM

## 2020-03-23 ENCOUNTER — HOSPITAL ENCOUNTER (OUTPATIENT)
Dept: RADIOLOGY | Facility: HOSPITAL | Age: 50
Discharge: HOME | End: 2020-03-23
Payer: COMMERCIAL

## 2020-03-23 VITALS — WEIGHT: 148 LBS | BODY MASS INDEX: 23.78 KG/M2 | HEIGHT: 66 IN

## 2020-03-23 DIAGNOSIS — Z12.31 VISIT FOR SCREENING MAMMOGRAM: ICD-10-CM

## 2020-03-23 PROCEDURE — 77067 SCR MAMMO BI INCL CAD: CPT

## 2020-03-23 PROCEDURE — 77063 BREAST TOMOSYNTHESIS BI: CPT

## 2020-07-13 ENCOUNTER — HOSPITAL ENCOUNTER (EMERGENCY)
Facility: HOSPITAL | Age: 50
Discharge: HOME | End: 2020-07-13
Attending: EMERGENCY MEDICINE | Admitting: EMERGENCY MEDICINE
Payer: COMMERCIAL

## 2020-07-13 ENCOUNTER — APPOINTMENT (EMERGENCY)
Dept: RADIOLOGY | Facility: HOSPITAL | Age: 50
End: 2020-07-13
Payer: COMMERCIAL

## 2020-07-13 VITALS
SYSTOLIC BLOOD PRESSURE: 108 MMHG | WEIGHT: 155 LBS | HEART RATE: 64 BPM | TEMPERATURE: 97.6 F | RESPIRATION RATE: 16 BRPM | DIASTOLIC BLOOD PRESSURE: 70 MMHG | BODY MASS INDEX: 25.02 KG/M2 | OXYGEN SATURATION: 100 %

## 2020-07-13 DIAGNOSIS — T40.5X5A ADVERSE EFFECT OF COCAINE, INITIAL ENCOUNTER: ICD-10-CM

## 2020-07-13 DIAGNOSIS — R07.9 CHEST PAIN, UNSPECIFIED: Primary | ICD-10-CM

## 2020-07-13 LAB
ALBUMIN SERPL BCP-MCNC: 3.7 G/DL (ref 3.5–5)
ALP SERPL-CCNC: 89 U/L (ref 46–116)
ALT SERPL W P-5'-P-CCNC: 28 U/L (ref 12–78)
AMPHETAMINES SERPL QL SCN: NEGATIVE
ANION GAP SERPL CALCULATED.3IONS-SCNC: 6 MMOL/L (ref 4–13)
APTT PPP: 28 SECONDS (ref 23–37)
AST SERPL W P-5'-P-CCNC: 20 U/L (ref 5–45)
ATRIAL RATE: 63 BPM
BARBITURATES UR QL: NEGATIVE
BASOPHILS # BLD AUTO: 0.06 THOUSANDS/ΜL (ref 0–0.1)
BASOPHILS NFR BLD AUTO: 1 % (ref 0–1)
BENZODIAZ UR QL: NEGATIVE
BILIRUB SERPL-MCNC: 0.5 MG/DL (ref 0.2–1)
BUN SERPL-MCNC: 10 MG/DL (ref 5–25)
CALCIUM SERPL-MCNC: 8.2 MG/DL (ref 8.3–10.1)
CHLORIDE SERPL-SCNC: 104 MMOL/L (ref 100–108)
CO2 SERPL-SCNC: 29 MMOL/L (ref 21–32)
COCAINE UR QL: POSITIVE
CREAT SERPL-MCNC: 0.95 MG/DL (ref 0.6–1.3)
D DIMER PPP FEU-MCNC: 0.53 UG/ML FEU
EOSINOPHIL # BLD AUTO: 0.18 THOUSAND/ΜL (ref 0–0.61)
EOSINOPHIL NFR BLD AUTO: 4 % (ref 0–6)
ERYTHROCYTE [DISTWIDTH] IN BLOOD BY AUTOMATED COUNT: 15.7 % (ref 11.6–15.1)
EXT PREG TEST URINE: NORMAL
EXT. CONTROL ED NAV: NORMAL
GFR SERPL CREATININE-BSD FRML MDRD: 71 ML/MIN/1.73SQ M
GLUCOSE SERPL-MCNC: 98 MG/DL (ref 65–140)
HCT VFR BLD AUTO: 35.4 % (ref 34.8–46.1)
HGB BLD-MCNC: 11.2 G/DL (ref 11.5–15.4)
IMM GRANULOCYTES # BLD AUTO: 0.01 THOUSAND/UL (ref 0–0.2)
IMM GRANULOCYTES NFR BLD AUTO: 0 % (ref 0–2)
INR PPP: 0.96 (ref 0.84–1.19)
LIPASE SERPL-CCNC: 178 U/L (ref 73–393)
LYMPHOCYTES # BLD AUTO: 1.56 THOUSANDS/ΜL (ref 0.6–4.47)
LYMPHOCYTES NFR BLD AUTO: 33 % (ref 14–44)
MCH RBC QN AUTO: 27.7 PG (ref 26.8–34.3)
MCHC RBC AUTO-ENTMCNC: 31.6 G/DL (ref 31.4–37.4)
MCV RBC AUTO: 87 FL (ref 82–98)
METHADONE UR QL: NEGATIVE
MONOCYTES # BLD AUTO: 0.29 THOUSAND/ΜL (ref 0.17–1.22)
MONOCYTES NFR BLD AUTO: 6 % (ref 4–12)
NEUTROPHILS # BLD AUTO: 2.69 THOUSANDS/ΜL (ref 1.85–7.62)
NEUTS SEG NFR BLD AUTO: 56 % (ref 43–75)
NRBC BLD AUTO-RTO: 0 /100 WBCS
NT-PROBNP SERPL-MCNC: 27 PG/ML
OPIATES UR QL SCN: NEGATIVE
OXYCODONE+OXYMORPHONE UR QL SCN: NEGATIVE
P AXIS: 33 DEGREES
PCP UR QL: NEGATIVE
PLATELET # BLD AUTO: 267 THOUSANDS/UL (ref 149–390)
PMV BLD AUTO: 9.9 FL (ref 8.9–12.7)
POTASSIUM SERPL-SCNC: 3.7 MMOL/L (ref 3.5–5.3)
PR INTERVAL: 120 MS
PROT SERPL-MCNC: 7 G/DL (ref 6.4–8.2)
PROTHROMBIN TIME: 12.7 SECONDS (ref 11.6–14.5)
QRS AXIS: 55 DEGREES
QRSD INTERVAL: 76 MS
QT INTERVAL: 402 MS
QTC INTERVAL: 411 MS
RBC # BLD AUTO: 4.05 MILLION/UL (ref 3.81–5.12)
SODIUM SERPL-SCNC: 139 MMOL/L (ref 136–145)
T WAVE AXIS: 60 DEGREES
THC UR QL: NEGATIVE
TROPONIN I SERPL-MCNC: <0.02 NG/ML
TROPONIN I SERPL-MCNC: <0.02 NG/ML
VENTRICULAR RATE: 63 BPM
WBC # BLD AUTO: 4.79 THOUSAND/UL (ref 4.31–10.16)

## 2020-07-13 PROCEDURE — 96374 THER/PROPH/DIAG INJ IV PUSH: CPT

## 2020-07-13 PROCEDURE — 93010 ELECTROCARDIOGRAM REPORT: CPT | Performed by: INTERNAL MEDICINE

## 2020-07-13 PROCEDURE — 85730 THROMBOPLASTIN TIME PARTIAL: CPT | Performed by: PHYSICIAN ASSISTANT

## 2020-07-13 PROCEDURE — 83690 ASSAY OF LIPASE: CPT | Performed by: PHYSICIAN ASSISTANT

## 2020-07-13 PROCEDURE — 80053 COMPREHEN METABOLIC PANEL: CPT | Performed by: PHYSICIAN ASSISTANT

## 2020-07-13 PROCEDURE — 71275 CT ANGIOGRAPHY CHEST: CPT

## 2020-07-13 PROCEDURE — 93005 ELECTROCARDIOGRAM TRACING: CPT

## 2020-07-13 PROCEDURE — 85610 PROTHROMBIN TIME: CPT | Performed by: PHYSICIAN ASSISTANT

## 2020-07-13 PROCEDURE — 99285 EMERGENCY DEPT VISIT HI MDM: CPT | Performed by: PHYSICIAN ASSISTANT

## 2020-07-13 PROCEDURE — 80307 DRUG TEST PRSMV CHEM ANLYZR: CPT | Performed by: PHYSICIAN ASSISTANT

## 2020-07-13 PROCEDURE — 84484 ASSAY OF TROPONIN QUANT: CPT | Performed by: PHYSICIAN ASSISTANT

## 2020-07-13 PROCEDURE — 85379 FIBRIN DEGRADATION QUANT: CPT | Performed by: PHYSICIAN ASSISTANT

## 2020-07-13 PROCEDURE — 36415 COLL VENOUS BLD VENIPUNCTURE: CPT | Performed by: PHYSICIAN ASSISTANT

## 2020-07-13 PROCEDURE — 96361 HYDRATE IV INFUSION ADD-ON: CPT

## 2020-07-13 PROCEDURE — 99285 EMERGENCY DEPT VISIT HI MDM: CPT

## 2020-07-13 PROCEDURE — 81025 URINE PREGNANCY TEST: CPT | Performed by: PHYSICIAN ASSISTANT

## 2020-07-13 PROCEDURE — 83880 ASSAY OF NATRIURETIC PEPTIDE: CPT | Performed by: PHYSICIAN ASSISTANT

## 2020-07-13 PROCEDURE — 85025 COMPLETE CBC W/AUTO DIFF WBC: CPT

## 2020-07-13 PROCEDURE — 71045 X-RAY EXAM CHEST 1 VIEW: CPT

## 2020-07-13 RX ORDER — KETOROLAC TROMETHAMINE 30 MG/ML
30 INJECTION, SOLUTION INTRAMUSCULAR; INTRAVENOUS ONCE
Status: COMPLETED | OUTPATIENT
Start: 2020-07-13 | End: 2020-07-13

## 2020-07-13 RX ADMIN — KETOROLAC TROMETHAMINE 30 MG: 30 INJECTION, SOLUTION INTRAMUSCULAR at 11:33

## 2020-07-13 RX ADMIN — SODIUM CHLORIDE 1000 ML: 0.9 INJECTION, SOLUTION INTRAVENOUS at 11:33

## 2020-07-13 RX ADMIN — IOHEXOL 85 ML: 350 INJECTION, SOLUTION INTRAVENOUS at 10:18

## 2020-07-13 NOTE — ED NOTES
Patient updated with plan of care to include waiting for the repeat troponin to be collected  Patient is aware that this will take place at 1228  Comfort measures provided for patient   No other complaints or questions at this time     Edward Golden RN  07/13/20 5811

## 2020-07-13 NOTE — ED PROVIDER NOTES
History  Chief Complaint   Patient presents with    Chest Pain     C/O CP that started yesterday at 2100  Patient reports a dull L sided CP that radiates to the back, through her shoulders and up her neck  PAtient reports the opain is worse with movement and upon inhalation  Patient states she did a piyo workout of friday  52year old female presents with chest pain x1 day  It began last night at 9pm, dull sudden onset of chest pain with occasional radiation to her back  Pain is 7/10 and intemittent  She has had an episode of this in the past, was told it was anxiety  Does have history of GERD however states this is different  Recently started a PiYo workout so is unsure if she pulled a muscle  No fever, chills, cold symptoms, leg swelling, orthopnea, abdominal pain, nausea, vomiting, diarrhea  No recent travel  No recent surgeries  Smokes cigarettes and marijuana regularly  Denies any other drug use  She drinks 4 alcoholic seltzers at a time on the weekends  No headache, dizziness, lightheadedness, weakness  Prior to Admission Medications   Prescriptions Last Dose Informant Patient Reported? Taking? cyclobenzaprine (FLEXERIL) 10 mg tablet   No No   Sig: Take 1 tablet (10 mg total) by mouth 2 (two) times a day as needed for muscle spasms   Patient not taking: Reported on 11/24/2019   ibuprofen (MOTRIN) 600 mg tablet   No No   Sig: Take 1 tablet (600 mg total) by mouth every 6 (six) hours as needed for mild pain   ondansetron (ZOFRAN) 4 mg tablet   No No   Sig: Take 1 tablet (4 mg total) by mouth every 6 (six) hours for 3 days      Facility-Administered Medications: None       Past Medical History:   Diagnosis Date    Patient denies medical problems        Past Surgical History:   Procedure Laterality Date    CHOLECYSTECTOMY      TONSILLECTOMY         Family History   Adopted: Yes     I have reviewed and agree with the history as documented      E-Cigarette/Vaping     E-Cigarette/Vaping Substances Social History     Tobacco Use    Smoking status: Current Some Day Smoker    Smokeless tobacco: Never Used    Tobacco comment: 1 pack a week   Substance Use Topics    Alcohol use: Yes     Frequency: 2-3 times a week     Drinks per session: 3 or 4     Binge frequency: Less than monthly     Comment: weekends    Drug use: Yes     Types: Marijuana     Comment: occasionally       Review of Systems   Constitutional: Negative for chills and fever  HENT: Negative for sneezing and sore throat  Respiratory: Negative for cough and shortness of breath  Cardiovascular: Positive for chest pain  Negative for palpitations and leg swelling  Gastrointestinal: Negative for abdominal pain, constipation, diarrhea, nausea and vomiting  Musculoskeletal: Negative for back pain, gait problem and joint swelling  Skin: Negative for color change, pallor, rash and wound  Neurological: Negative for dizziness, syncope, weakness, light-headedness, numbness and headaches  All other systems reviewed and are negative  Physical Exam  Physical Exam   Constitutional: She appears well-developed and well-nourished  No distress  HENT:   Head: Normocephalic and atraumatic  Right Ear: Hearing, tympanic membrane, external ear and ear canal normal  Tympanic membrane is not perforated, not erythematous, not retracted and not bulging  Left Ear: Hearing, tympanic membrane, external ear and ear canal normal  Tympanic membrane is not perforated, not erythematous, not retracted and not bulging  Nose: Nose normal    Mouth/Throat: Uvula is midline, oropharynx is clear and moist and mucous membranes are normal  No trismus in the jaw  No uvula swelling  No oropharyngeal exudate, posterior oropharyngeal edema, posterior oropharyngeal erythema or tonsillar abscesses  Eyes: EOM are normal    Neck: Normal range of motion  Cardiovascular: Normal rate, regular rhythm, normal heart sounds and intact distal pulses   Exam reveals no gallop and no friction rub  No murmur heard  Pulmonary/Chest: Effort normal and breath sounds normal  No stridor  No respiratory distress  She has no wheezes  She has no rales  Sp02 is 98% indicating adequate oxygenation on room air   Skin: Skin is warm and dry  Capillary refill takes less than 2 seconds  No rash noted  She is not diaphoretic  No erythema  No pallor  Nursing note and vitals reviewed  Vital Signs  ED Triage Vitals [07/13/20 0900]   Temperature Pulse Respirations Blood Pressure SpO2   97 6 °F (36 4 °C) 68 18 114/68 98 %      Temp Source Heart Rate Source Patient Position - Orthostatic VS BP Location FiO2 (%)   Tympanic Monitor Sitting Right arm --      Pain Score       7           Vitals:    07/13/20 1045 07/13/20 1115 07/13/20 1145 07/13/20 1215   BP: 110/64 119/79 115/60 108/69   Pulse: 70 64 64 60   Patient Position - Orthostatic VS:             Visual Acuity      ED Medications  Medications   sodium chloride 0 9 % bolus 1,000 mL (1,000 mL Intravenous New Bag 7/13/20 1133)   iohexol (OMNIPAQUE) 350 MG/ML injection (MULTI-DOSE) 85 mL (85 mL Intravenous Given 7/13/20 1018)   ketorolac (TORADOL) injection 30 mg (30 mg Intravenous Given 7/13/20 1133)       Diagnostic Studies  Results Reviewed     Procedure Component Value Units Date/Time    Troponin I [[id number]]  (Normal) Collected:  07/13/20 1227    Lab Status:  Final result Specimen:  Blood from Arm, Left Updated:  07/13/20 1253     Troponin I <0 02 ng/mL     Rapid drug screen, urine [[id number]]  (Abnormal) Collected:  07/13/20 1008    Lab Status:  Final result Specimen:  Urine, Clean Catch Updated:  07/13/20 1029     Amph/Meth UR Negative     Barbiturate Ur Negative     Benzodiazepine Urine Negative     Cocaine Urine Positive     Methadone Urine Negative     Opiate Urine Negative     PCP Ur Negative     THC Urine Negative     Oxycodone Urine Negative    Narrative:       Presumptive report   If requested, specimen will be sent to reference lab for confirmation  FOR MEDICAL PURPOSES ONLY  IF CONFIRMATION NEEDED PLEASE CONTACT THE LAB WITHIN 5 DAYS      Drug Screen Cutoff Levels:  AMPHETAMINE/METHAMPHETAMINES  1000 ng/mL  BARBITURATES     200 ng/mL  BENZODIAZEPINES     200 ng/mL  COCAINE      300 ng/mL  METHADONE      300 ng/mL  OPIATES      300 ng/mL  PHENCYCLIDINE     25 ng/mL  THC       50 ng/mL  OXYCODONE      100 ng/mL    POCT pregnancy, urine [[id number]]  (Normal) Resulted:  07/13/20 1010    Lab Status:  Final result Updated:  07/13/20 1010     EXT PREG TEST UR (Ref: Negative) neg     Control valid    Comprehensive metabolic panel [[id number]]  (Abnormal) Collected:  07/13/20 0921    Lab Status:  Final result Specimen:  Blood from Arm, Left Updated:  07/13/20 0954     Sodium 139 mmol/L      Potassium 3 7 mmol/L      Chloride 104 mmol/L      CO2 29 mmol/L      ANION GAP 6 mmol/L      BUN 10 mg/dL      Creatinine 0 95 mg/dL      Glucose 98 mg/dL      Calcium 8 2 mg/dL      AST 20 U/L      ALT 28 U/L      Alkaline Phosphatase 89 U/L      Total Protein 7 0 g/dL      Albumin 3 7 g/dL      Total Bilirubin 0 50 mg/dL      eGFR 71 ml/min/1 73sq m     Narrative:       [place] guidelines for Chronic Kidney Disease (CKD):     Stage 1 with normal or high GFR (GFR > 90 mL/min/1 73 square meters)    Stage 2 Mild CKD (GFR = 60-89 mL/min/1 73 square meters)    Stage 3A Moderate CKD (GFR = 45-59 mL/min/1 73 square meters)    Stage 3B Moderate CKD (GFR = 30-44 mL/min/1 73 square meters)    Stage 4 Severe CKD (GFR = 15-29 mL/min/1 73 square meters)    Stage 5 End Stage CKD (GFR <15 mL/min/1 73 square meters)  Note: GFR calculation is accurate only with a steady state creatinine    NT-BNP PRO [[id number]]  (Normal) Collected:  07/13/20 0921    Lab Status:  Final result Specimen:  Blood from Arm, Left Updated:  07/13/20 0954     NT-proBNP 27 pg/mL     Lipase [[id number]]  (Normal) Collected:  07/13/20 0921    Lab Status: Final result Specimen:  Blood from Arm, Left Updated:  07/13/20 0954     Lipase 178 u/L     Troponin I [[id number]]  (Normal) Collected:  07/13/20 0921    Lab Status:  Final result Specimen:  Blood from Arm, Left Updated:  07/13/20 0950     Troponin I <0 02 ng/mL     D-Dimer [[id number]]  (Abnormal) Collected:  07/13/20 0921    Lab Status:  Final result Specimen:  Blood from Arm, Left Updated:  07/13/20 0946     D-Dimer, Quant 0 53 ug/ml FEU     Protime-INR [[id number]]  (Normal) Collected:  07/13/20 0921    Lab Status:  Final result Specimen:  Blood from Arm, Left Updated:  07/13/20 0942     Protime 12 7 seconds      INR 0 96    APTT [[id number]]  (Normal) Collected:  07/13/20 0921    Lab Status:  Final result Specimen:  Blood from Arm, Left Updated:  07/13/20 0942     PTT 28 seconds     CBC and differential [[id number]]  (Abnormal) Collected:  07/13/20 0923    Lab Status:  Final result Specimen:  Blood from Arm, Left Updated:  07/13/20 0928     WBC 4 79 Thousand/uL      RBC 4 05 Million/uL      Hemoglobin 11 2 g/dL      Hematocrit 35 4 %      MCV 87 fL      MCH 27 7 pg      MCHC 31 6 g/dL      RDW 15 7 %      MPV 9 9 fL      Platelets 173 Thousands/uL      nRBC 0 /100 WBCs      Neutrophils Relative 56 %      Immat GRANS % 0 %      Lymphocytes Relative 33 %      Monocytes Relative 6 %      Eosinophils Relative 4 %      Basophils Relative 1 %      Neutrophils Absolute 2 69 Thousands/µL      Immature Grans Absolute 0 01 Thousand/uL      Lymphocytes Absolute 1 56 Thousands/µL      Monocytes Absolute 0 29 Thousand/µL      Eosinophils Absolute 0 18 Thousand/µL      Basophils Absolute 0 06 Thousands/µL                  XR chest 1 view portable   Final Result by Berenice Mike MD (07/13 1106)      No acute cardiopulmonary disease  Workstation performed: [id number]         CTA ED chest PE Study   Final Result by Jorden Yarbrough MD (07/13 1040)      No evidence for pulmonary embolism                    Workstation performed: [id number]                    Procedures  ECG 12 Lead Documentation Only  Date/Time: 7/13/2020 9:12 AM  Performed by: Mary Edwards PA-C  Authorized by: Mary Edwards PA-C     Indications / Diagnosis:  Chest pain  Patient location:  ED  Interpretation:     Interpretation: normal    Rate:     ECG rate:  63    ECG rate assessment: normal    Rhythm:     Rhythm: sinus rhythm    Ectopy:     Ectopy: none    QRS:     QRS axis:  Normal    QRS intervals:  Normal  Conduction:     Conduction: normal    ST segments:     ST segments:  Normal  T waves:     T waves: normal               ED Course       US AUDIT      Most Recent Value   Initial Alcohol Screen: US AUDIT-C    1  How often do you have a drink containing alcohol? 3 Filed at: 07/13/2020 0901   2  How many drinks containing alcohol do you have on a typical day you are drinking? 3 Filed at: 07/13/2020 0901   3a  Male UNDER 65: How often do you have five or more drinks on one occasion? 0 Filed at: 07/13/2020 0901   3b  FEMALE Any Age, or MALE 65+: How often do you have 4 or more drinks on one occassion? 0 Filed at: 07/13/2020 0901   Audit-C Score  6 Filed at: 07/13/2020 0901            HEART Risk Score      Most Recent Value   Heart Score Risk Calculator   History  0 Filed at: 07/13/2020 0959   ECG  0 Filed at: 07/13/2020 8925   Age  1 Filed at: 07/13/2020 0959   Risk Factors  1 Filed at: 07/13/2020 0959   Troponin  0 Filed at: 07/13/2020 0959   HEART Score  2 Filed at: 07/13/2020 0959            SPENCER/DAST-10      Most Recent Value   How many times in the past year have you    Used an illegal drug or used a prescription medication for non-medical reasons?   Never Filed at: 07/13/2020 2551                    Wells' Criteria for PE      Most Recent Value   Wells' Criteria for PE   Clinical signs and symptoms of DVT  0 Filed at: 07/13/2020 5284   PE is primary diagnosis or equally likely  3 Filed at: 07/13/2020 0950   HR >100  0 Filed at: 07/13/2020 0460 Immobilization at least 3 days or Surgery in the previous 4 weeks  0 Filed at: 07/13/2020 0950   Previous, objectively diagnosed PE or DVT  0 Filed at: 07/13/2020 0950   Hemoptysis  0 Filed at: 07/13/2020 1834   Malignancy with treatment within 6 months or palliative  0 Filed at: 07/13/2020 5732   Wells' Criteria Total  3 Filed at: 07/13/2020 7004                  MDM  Number of Diagnoses or Management Options  Adverse effect of cocaine, initial encounter:   Chest pain, unspecified:   Diagnosis management comments: Chest pain likely secondary combo of recent cocaine use along with musculoskeletal cause  Can take nsaids prn pain, felt relief after hydration and toradol while in ED  Discussed harmful effects of continued cocaine use such as MI/myocarditis/stroke/dissection/etc and encouraged to d/c any/all future drug use  CTA PE study negative, negative troponins, ECG NSR, HEART score 2  Given cardiology follow up  Gave patient proper education regarding diagnosis  Answered all questions  Return to ED for any worsening of symptoms otherwise follow up with primary care physician for re-evaluation  Discussed plan with patient who verbalized understanding and agreed to plan         Amount and/or Complexity of Data Reviewed  Clinical lab tests: reviewed and ordered  Tests in the radiology section of CPT®: ordered and reviewed  Tests in the medicine section of CPT®: reviewed and ordered  Discussion of test results with the performing providers: yes  Review and summarize past medical records: yes  Discuss the patient with other providers: yes  Independent visualization of images, tracings, or specimens: yes          Disposition  Final diagnoses:   Chest pain, unspecified   Adverse effect of cocaine, initial encounter     Time reflects when diagnosis was documented in both MDM as applicable and the Disposition within this note     Time User Action Codes Description Comment    7/13/2020 12:54 PM Cecille Cox Add [R07 9] Chest pain, unspecified     7/13/2020 12:54 PM Jennifer Lancaster Add [T40 5X5A] Adverse effect of cocaine, initial encounter       ED Disposition     ED Disposition Condition Date/Time Comment    Discharge Stable Mon Jul 13, 2020 12:54 PM Jimmy Letters discharge to home/self care  Follow-up Information     Follow up With Specialties Details Why Contact Info Additional Information    Yesi Stevens MD Cardiology Schedule an appointment as soon as possible for a visit in 1 week If symptoms worsen [address] [facility] Emergency Medicine Go to  As needed [address] [facility]  [phone] [facility], [place], [place], [address]          Patient's Medications   Discharge Prescriptions    No medications on file     No discharge procedures on file      PDMP Review     None          ED Provider  Electronically Signed by           Miki Muir PA-C  07/13/20 3231

## 2021-03-09 ENCOUNTER — TRANSCRIBE ORDERS (OUTPATIENT)
Dept: ADMINISTRATIVE | Facility: HOSPITAL | Age: 51
End: 2021-03-09

## 2021-03-09 DIAGNOSIS — Z12.31 SCREENING MAMMOGRAM FOR HIGH-RISK PATIENT: Primary | ICD-10-CM

## 2021-04-08 ENCOUNTER — HOSPITAL ENCOUNTER (OUTPATIENT)
Dept: RADIOLOGY | Facility: HOSPITAL | Age: 51
Discharge: HOME | End: 2021-04-08
Payer: COMMERCIAL

## 2021-04-08 DIAGNOSIS — Z12.31 SCREENING MAMMOGRAM FOR HIGH-RISK PATIENT: ICD-10-CM

## 2021-04-08 PROCEDURE — 77063 BREAST TOMOSYNTHESIS BI: CPT

## 2021-04-08 PROCEDURE — 77067 SCR MAMMO BI INCL CAD: CPT

## 2022-05-11 ENCOUNTER — HOSPITAL ENCOUNTER (OUTPATIENT)
Dept: RADIOLOGY | Facility: HOSPITAL | Age: 52
Discharge: HOME | End: 2022-05-11
Payer: COMMERCIAL

## 2022-05-11 VITALS — HEIGHT: 66 IN | BODY MASS INDEX: 24.91 KG/M2 | WEIGHT: 155 LBS

## 2022-05-11 DIAGNOSIS — Z12.31 ENCOUNTER FOR SCREENING MAMMOGRAM FOR MALIGNANT NEOPLASM OF BREAST: ICD-10-CM

## 2022-05-11 DIAGNOSIS — R58 EXCESSIVE BLEEDING: ICD-10-CM

## 2022-05-11 DIAGNOSIS — N95.9 PREMENOPAUSAL PATIENT: ICD-10-CM

## 2022-05-11 PROCEDURE — 77063 BREAST TOMOSYNTHESIS BI: CPT

## 2022-05-11 PROCEDURE — 76830 TRANSVAGINAL US NON-OB: CPT

## 2022-05-11 PROCEDURE — 76856 US EXAM PELVIC COMPLETE: CPT

## 2022-05-11 PROCEDURE — 77067 SCR MAMMO BI INCL CAD: CPT

## 2022-08-29 ENCOUNTER — HOSPITAL ENCOUNTER (OUTPATIENT)
Dept: RADIOLOGY | Facility: HOSPITAL | Age: 52
Discharge: HOME | End: 2022-08-29
Payer: COMMERCIAL

## 2022-08-29 ENCOUNTER — OFFICE VISIT (OUTPATIENT)
Dept: RHEUMATOLOGY | Facility: CLINIC | Age: 52
End: 2022-08-29
Payer: COMMERCIAL

## 2022-08-29 ENCOUNTER — LAB (OUTPATIENT)
Dept: LAB | Facility: HOSPITAL | Age: 52
End: 2022-08-29
Payer: COMMERCIAL

## 2022-08-29 VITALS
HEIGHT: 66 IN | RESPIRATION RATE: 19 BRPM | DIASTOLIC BLOOD PRESSURE: 81 MMHG | HEART RATE: 76 BPM | BODY MASS INDEX: 26.52 KG/M2 | SYSTOLIC BLOOD PRESSURE: 129 MMHG | TEMPERATURE: 98.2 F | WEIGHT: 165 LBS

## 2022-08-29 DIAGNOSIS — M79.641 BILATERAL HAND PAIN: Primary | ICD-10-CM

## 2022-08-29 DIAGNOSIS — M79.642 BILATERAL HAND PAIN: ICD-10-CM

## 2022-08-29 DIAGNOSIS — M19.042 PRIMARY OSTEOARTHRITIS OF BOTH HANDS: ICD-10-CM

## 2022-08-29 DIAGNOSIS — M19.041 PRIMARY OSTEOARTHRITIS OF BOTH HANDS: ICD-10-CM

## 2022-08-29 DIAGNOSIS — M79.641 BILATERAL HAND PAIN: ICD-10-CM

## 2022-08-29 DIAGNOSIS — M79.642 BILATERAL HAND PAIN: Primary | ICD-10-CM

## 2022-08-29 LAB
CRP SERPL QL: 3.1 MG/L
ERYTHROCYTE [SEDIMENTATION RATE] IN BLOOD: 9 MM/HOUR (ref 0–29)

## 2022-08-29 PROCEDURE — 86140 C-REACTIVE PROTEIN: CPT

## 2022-08-29 PROCEDURE — 73130 X-RAY EXAM OF HAND: CPT

## 2022-08-29 PROCEDURE — 86430 RHEUMATOID FACTOR TEST QUAL: CPT

## 2022-08-29 PROCEDURE — 86038 ANTINUCLEAR ANTIBODIES: CPT

## 2022-08-29 PROCEDURE — 36415 COLL VENOUS BLD VENIPUNCTURE: CPT

## 2022-08-29 PROCEDURE — 81374 HLA I TYPING 1 ANTIGEN LR: CPT

## 2022-08-29 PROCEDURE — 99205 OFFICE O/P NEW HI 60 MIN: CPT | Performed by: INTERNAL MEDICINE

## 2022-08-29 PROCEDURE — 85652 RBC SED RATE AUTOMATED: CPT

## 2022-08-29 PROCEDURE — 86200 CCP ANTIBODY: CPT

## 2022-08-29 PROCEDURE — 86235 NUCLEAR ANTIGEN ANTIBODY: CPT

## 2022-08-29 NOTE — PROGRESS NOTES
Assessment and Plan:   Ms Rosa Mak is a 26-year-old female with no significant past medical history who presents for evaluation of bilateral hand pain  She is self-referred  Johanna Carrizales presents today for an evaluation of bilateral hand pain which has been stable over the past 5 years  Her articular and extra-articular review of symptoms is otherwise unrevealing  There is no synovitis on her examination today and I suspect the hand pain is related to non-inflammatory etiologies such as mild osteoarthritis or overuse activities  I discussed with her in this case the management will be conservative with use of Tylenol/ibuprofen as needed, application of Voltaren gel to the hands 3-4 times daily as needed and a trial with physical therapy if required  I provided reassurance that this is a benign form of arthritis and will not lead to significant deformities  Plan:  Diagnoses and all orders for this visit:    Bilateral hand pain  -     C-reactive protein; Future  -     Sedimentation rate, automated; Future  -     Sjogren's Antibodies; Future  -     RF Screen w/ Reflex to Titer; Future  -     Cyclic citrul peptide antibody, IgG; Future  -     HLA-B27 antigen; Future  -     XR hand 3+ vw right; Future  -     XR hand 3+ vw left; Future  -     Antinuclear Antibodies, IFA; Future    Primary osteoarthritis of both hands      I have personally reviewed pertinent films in PACS of the right elbow XR which is normal        Activities as tolerated  Exercise: try to maintain a low impact exercise regimen as much as possible  Continue other medications as prescribed by PCP and other specialists  RTC PRN  HPI  Ms Rosa Mak is a 26-year-old female with no significant past medical history who presents for evaluation of bilateral hand pain  She is self-referred        Patient reports she has experienced bilateral hand pain for approximately 5 years now but her symptoms have not necessarily worsened over time   Her symptoms usually occur in the morning and lasts for a few hours before resolving spontaneously and then she is asymptomatic throughout the day  She reports with certain activities such as gardening she will notice a flare up in the hand pain  She also describes morning stiffness of her hands that takes a few hours to improve  No swelling of the joints in her hands, but she does feel like she has developed some bony nodules at her DIP joints  She reports occasional pain in her wrists if she has been typing all day  No other joint pains, swollen joints or morning stiffness of other joints  She takes a very rare ibuprofen 600 mg once a day as needed which does help  She denies fevers, unintentional weight loss, alopecia, dry eyes, dry mouth, skin rash, psoriasis, mouth/nose ulcers, swollen glands, pleuritic chest pain, inflammatory bowel disease, blood clots (reports a history of 1 miscarriage) and is unaware of her family history as she is adopted  She reports mild symptoms of Raynaud's with a pale discoloration noted of her fingers with cold exposure which does improve upon rewarming  The following portions of the patient's history were reviewed and updated as appropriate: allergies, current medications, past family history, past medical history, past social history, past surgical history and problem list       Review of Systems  Constitutional: Negative for weight change, fevers, chills, night sweats, fatigue  ENT/Mouth: Negative for hearing changes, ear pain, nasal congestion, sinus pain, hoarseness, sore throat, rhinorrhea, swallowing difficulty  Eyes: Negative for pain, redness, discharge, vision changes  Cardiovascular: Negative for chest pain, SOB, palpitations  Respiratory: Negative for cough, sputum, wheezing, dyspnea  Gastrointestinal: Negative for nausea, vomiting, diarrhea, constipation, pain, heartburn  Genitourinary: Negative for dysuria, urinary frequency, hematuria  Musculoskeletal: As per HPI  Skin: Negative for skin rash, color changes  Neuro: Negative for weakness, numbness, tingling, loss of consciousness  Psych: Negative for anxiety, depression  Heme/Lymph: Negative for easy bruising, bleeding, lymphadenopathy          Past Medical History:   Diagnosis Date    Patient denies medical problems        Past Surgical History:   Procedure Laterality Date    CHOLECYSTECTOMY      TONSILLECTOMY         Social History     Socioeconomic History    Marital status: /Civil Union     Spouse name: Not on file    Number of children: Not on file    Years of education: Not on file    Highest education level: Not on file   Occupational History    Not on file   Tobacco Use    Smoking status: Current Some Day Smoker    Smokeless tobacco: Never Used    Tobacco comment: 1 pack a week   Substance and Sexual Activity    Alcohol use: Yes     Comment: weekends    Drug use: Yes     Types: Marijuana     Comment: occasionally    Sexual activity: Not on file   Other Topics Concern    Not on file   Social History Narrative    Not on file     Social Determinants of Health     Financial Resource Strain: Not on file   Food Insecurity: Not on file   Transportation Needs: Not on file   Physical Activity: Not on file   Stress: Not on file   Social Connections: Not on file   Intimate Partner Violence: Not on file   Housing Stability: Not on file       Family History   Adopted: Yes       No Known Allergies      Current Outpatient Medications:     cyclobenzaprine (FLEXERIL) 10 mg tablet, Take 1 tablet (10 mg total) by mouth 2 (two) times a day as needed for muscle spasms, Disp: 10 tablet, Rfl: 0    ibuprofen (MOTRIN) 600 mg tablet, Take 1 tablet (600 mg total) by mouth every 6 (six) hours as needed for mild pain, Disp: 30 tablet, Rfl: 0    ondansetron (ZOFRAN) 4 mg tablet, Take 1 tablet (4 mg total) by mouth every 6 (six) hours for 3 days, Disp: 9 tablet, Rfl: 0      Objective:    Vitals:    08/29/22 1259   BP: 129/81   Pulse: 76   Resp: 19   Temp: 98 2 °F (36 8 °C)   Weight: 74 8 kg (165 lb)   Height: 5' 6" (1 676 m)       Physical Exam  General: Well appearing, well nourished, in no distress  Oriented x 3, normal mood and affect  Ambulating without difficulty  Skin: Good turgor, no rash, unusual bruising or prominent lesions  Hair: Normal texture and distribution  Nails: Normal color, no deformities  HEENT:  Head: Normocephalic, atraumatic  Eyes: Conjunctiva clear, sclera non-icteric, EOM intact  Extremities: No amputations or deformities, cyanosis, edema  Musculoskeletal:   Hands - no swelling or tenderness noted  She has minimal osteoarthritic changes at the bilateral DIP joints  Neurologic: Alert and oriented  No focal neurological deficits appreciated  Psychiatric: Normal mood and affect  STANLEY Tapia    Rheumatology

## 2022-08-30 LAB
CCP AB SER IA-ACNC: 1
RHEUMATOID FACT SER QL LA: NEGATIVE

## 2022-08-31 LAB
ENA SS-A AB SER-ACNC: <0.2 AI (ref 0–0.9)
ENA SS-B AB SER-ACNC: <0.2 AI (ref 0–0.9)

## 2022-09-01 LAB
ANA HOMOGEN TITR SER: NORMAL {TITER}
ANA TITR SER IF: POSITIVE {TITER}
SL AMB NOTE:: NORMAL

## 2022-09-10 ENCOUNTER — TELEPHONE (OUTPATIENT)
Dept: RHEUMATOLOGY | Facility: CLINIC | Age: 52
End: 2022-09-10

## 2022-09-10 DIAGNOSIS — R76.8 POSITIVE ANA (ANTINUCLEAR ANTIBODY): Primary | ICD-10-CM

## 2022-09-10 LAB — HLA-B27 QL NAA+PROBE: NEGATIVE

## 2022-09-11 NOTE — TELEPHONE ENCOUNTER
Please let patient know the x-rays shows wear and tear arthritis as expected  All of the blood work was normal except for one borderline nonspecific autoimmune test - I suspect this is a false positive but would like to obtain some specific blood work and urine test - will place orders in her chart and contact when I receive the results

## 2022-09-14 ENCOUNTER — LAB (OUTPATIENT)
Dept: LAB | Facility: HOSPITAL | Age: 52
End: 2022-09-14
Payer: COMMERCIAL

## 2022-09-14 DIAGNOSIS — R76.8 POSITIVE ANA (ANTINUCLEAR ANTIBODY): ICD-10-CM

## 2022-09-14 LAB
BACTERIA UR QL AUTO: ABNORMAL /HPF
BILIRUB UR QL STRIP: NEGATIVE
C3 SERPL-MCNC: 104 MG/DL (ref 90–180)
C4 SERPL-MCNC: 23 MG/DL (ref 10–40)
CLARITY UR: CLEAR
COLOR UR: ABNORMAL
CREAT UR-MCNC: 36.7 MG/DL
GLUCOSE UR STRIP-MCNC: NEGATIVE MG/DL
HGB UR QL STRIP.AUTO: NEGATIVE
KETONES UR STRIP-MCNC: NEGATIVE MG/DL
LEUKOCYTE ESTERASE UR QL STRIP: NEGATIVE
NITRITE UR QL STRIP: NEGATIVE
NON-SQ EPI CELLS URNS QL MICRO: ABNORMAL /HPF
PH UR STRIP.AUTO: 7 [PH]
PROT UR STRIP-MCNC: NEGATIVE MG/DL
PROT UR-MCNC: <6 MG/DL
PROT/CREAT UR: <0.16 MG/G{CREAT} (ref 0–0.1)
RBC #/AREA URNS AUTO: ABNORMAL /HPF
SP GR UR STRIP.AUTO: 1.01 (ref 1–1.03)
UROBILINOGEN UR QL STRIP.AUTO: 0.2 E.U./DL
WBC #/AREA URNS AUTO: ABNORMAL /HPF

## 2022-09-14 PROCEDURE — 86225 DNA ANTIBODY NATIVE: CPT

## 2022-09-14 PROCEDURE — 84156 ASSAY OF PROTEIN URINE: CPT | Performed by: INTERNAL MEDICINE

## 2022-09-14 PROCEDURE — 36415 COLL VENOUS BLD VENIPUNCTURE: CPT

## 2022-09-14 PROCEDURE — 86160 COMPLEMENT ANTIGEN: CPT

## 2022-09-14 PROCEDURE — 86235 NUCLEAR ANTIGEN ANTIBODY: CPT

## 2022-09-14 PROCEDURE — 82570 ASSAY OF URINE CREATININE: CPT | Performed by: INTERNAL MEDICINE

## 2022-09-14 PROCEDURE — 81001 URINALYSIS AUTO W/SCOPE: CPT | Performed by: INTERNAL MEDICINE

## 2022-09-16 LAB
CENTROMERE B AB SER-ACNC: <0.2 AI (ref 0–0.9)
DSDNA AB SER-ACNC: <1 IU/ML (ref 0–9)
ENA RNP AB SER-ACNC: <0.2 AI (ref 0–0.9)
ENA SCL70 AB SER-ACNC: <0.2 AI (ref 0–0.9)
ENA SM AB SER-ACNC: <0.2 AI (ref 0–0.9)

## 2022-09-19 ENCOUNTER — TELEPHONE (OUTPATIENT)
Dept: RHEUMATOLOGY | Facility: CLINIC | Age: 52
End: 2022-09-19

## 2022-09-19 NOTE — TELEPHONE ENCOUNTER
----- Message from José Luis Weaver MD sent at 9/16/2022  4:49 PM EDT -----  Please let her know the additional autoimmune arthritis testing was normal  As we discussed no concerns for autoimmune disease and the hand arthritis is mild osteoarthritis  Thanks

## 2022-10-06 ENCOUNTER — TELEPHONE (OUTPATIENT)
Dept: OBGYN CLINIC | Facility: HOSPITAL | Age: 52
End: 2022-10-06

## 2022-10-06 NOTE — TELEPHONE ENCOUNTER
Caller: Patient     Doctor: Benja Shaikh    Reason for call: Patient is returning a call from a message regarding an appt date    Call back#:[phone]

## 2023-06-17 ENCOUNTER — APPOINTMENT (EMERGENCY)
Dept: RADIOLOGY | Facility: HOSPITAL | Age: 53
DRG: 299 | End: 2023-06-17
Payer: COMMERCIAL

## 2023-06-17 ENCOUNTER — HOSPITAL ENCOUNTER (INPATIENT)
Facility: HOSPITAL | Age: 53
LOS: 2 days | Discharge: HOME | DRG: 299 | End: 2023-06-19
Attending: EMERGENCY MEDICINE | Admitting: INTERNAL MEDICINE
Payer: COMMERCIAL

## 2023-06-17 DIAGNOSIS — I26.99 PULMONARY EMBOLISM (HCC): Primary | ICD-10-CM

## 2023-06-17 DIAGNOSIS — K21.9 GASTROESOPHAGEAL REFLUX DISEASE WITHOUT ESOPHAGITIS: ICD-10-CM

## 2023-06-17 DIAGNOSIS — J40 BRONCHITIS: ICD-10-CM

## 2023-06-17 DIAGNOSIS — Z87.891 HISTORY OF SMOKING: ICD-10-CM

## 2023-06-17 DIAGNOSIS — M79.605 PAIN OF LEFT LOWER EXTREMITY: ICD-10-CM

## 2023-06-17 DIAGNOSIS — I82.409 DVT (DEEP VENOUS THROMBOSIS) (HCC): ICD-10-CM

## 2023-06-17 PROBLEM — D64.9 ANEMIA: Status: ACTIVE | Noted: 2023-06-17

## 2023-06-17 PROBLEM — F41.9 ANXIETY: Status: ACTIVE | Noted: 2023-06-17

## 2023-06-17 PROBLEM — Z90.710 S/P HYSTERECTOMY: Status: ACTIVE | Noted: 2023-06-17

## 2023-06-17 PROBLEM — M79.606 LEG PAIN: Status: ACTIVE | Noted: 2023-06-17

## 2023-06-17 LAB
2HR DELTA HS TROPONIN: 1 NG/L
4HR DELTA HS TROPONIN: 0 NG/L
ANION GAP SERPL CALCULATED.3IONS-SCNC: 7 MMOL/L (ref 4–13)
APTT PPP: 25 SECONDS (ref 23–37)
APTT PPP: 62 SECONDS (ref 23–37)
APTT PPP: 78 SECONDS (ref 23–37)
BASOPHILS # BLD AUTO: 0.11 THOUSANDS/ÂΜL (ref 0–0.1)
BASOPHILS NFR BLD AUTO: 2 % (ref 0–1)
BNP SERPL-MCNC: 56 PG/ML (ref 0–100)
BUN SERPL-MCNC: 16 MG/DL (ref 5–25)
CALCIUM SERPL-MCNC: 9 MG/DL (ref 8.4–10.2)
CARDIAC TROPONIN I PNL SERPL HS: 3 NG/L
CARDIAC TROPONIN I PNL SERPL HS: 3 NG/L
CARDIAC TROPONIN I PNL SERPL HS: 4 NG/L
CHLORIDE SERPL-SCNC: 106 MMOL/L (ref 96–108)
CO2 SERPL-SCNC: 25 MMOL/L (ref 21–32)
CREAT SERPL-MCNC: 0.82 MG/DL (ref 0.6–1.3)
EOSINOPHIL # BLD AUTO: 0.52 THOUSAND/ÂΜL (ref 0–0.61)
EOSINOPHIL NFR BLD AUTO: 8 % (ref 0–6)
ERYTHROCYTE [DISTWIDTH] IN BLOOD BY AUTOMATED COUNT: 14.4 % (ref 11.6–15.1)
GFR SERPL CREATININE-BSD FRML MDRD: 82 ML/MIN/1.73SQ M
GLUCOSE SERPL-MCNC: 103 MG/DL (ref 65–140)
HCT VFR BLD AUTO: 33.6 % (ref 34.8–46.1)
HGB BLD-MCNC: 10.9 G/DL (ref 11.5–15.4)
IMM GRANULOCYTES # BLD AUTO: 0.03 THOUSAND/UL (ref 0–0.2)
IMM GRANULOCYTES NFR BLD AUTO: 0 % (ref 0–2)
INR PPP: 0.96 (ref 0.84–1.19)
LYMPHOCYTES # BLD AUTO: 2.23 THOUSANDS/ÂΜL (ref 0.6–4.47)
LYMPHOCYTES NFR BLD AUTO: 33 % (ref 14–44)
MCH RBC QN AUTO: 27.9 PG (ref 26.8–34.3)
MCHC RBC AUTO-ENTMCNC: 32.4 G/DL (ref 31.4–37.4)
MCV RBC AUTO: 86 FL (ref 82–98)
MONOCYTES # BLD AUTO: 0.39 THOUSAND/ÂΜL (ref 0.17–1.22)
MONOCYTES NFR BLD AUTO: 6 % (ref 4–12)
NEUTROPHILS # BLD AUTO: 3.45 THOUSANDS/ÂΜL (ref 1.85–7.62)
NEUTS SEG NFR BLD AUTO: 51 % (ref 43–75)
NRBC BLD AUTO-RTO: 0 /100 WBCS
PLATELET # BLD AUTO: 379 THOUSANDS/UL (ref 149–390)
PMV BLD AUTO: 9.2 FL (ref 8.9–12.7)
POTASSIUM SERPL-SCNC: 3.7 MMOL/L (ref 3.5–5.3)
PROCALCITONIN SERPL-MCNC: <0.05 NG/ML
PROTHROMBIN TIME: 12.9 SECONDS (ref 11.6–14.5)
RBC # BLD AUTO: 3.91 MILLION/UL (ref 3.81–5.12)
SODIUM SERPL-SCNC: 138 MMOL/L (ref 135–147)
WBC # BLD AUTO: 6.73 THOUSAND/UL (ref 4.31–10.16)

## 2023-06-17 PROCEDURE — 80048 BASIC METABOLIC PNL TOTAL CA: CPT | Performed by: EMERGENCY MEDICINE

## 2023-06-17 PROCEDURE — 93971 EXTREMITY STUDY: CPT | Performed by: SURGERY

## 2023-06-17 PROCEDURE — 84145 PROCALCITONIN (PCT): CPT | Performed by: EMERGENCY MEDICINE

## 2023-06-17 PROCEDURE — 71275 CT ANGIOGRAPHY CHEST: CPT

## 2023-06-17 PROCEDURE — 85730 THROMBOPLASTIN TIME PARTIAL: CPT | Performed by: INTERNAL MEDICINE

## 2023-06-17 PROCEDURE — 94760 N-INVAS EAR/PLS OXIMETRY 1: CPT

## 2023-06-17 PROCEDURE — 93971 EXTREMITY STUDY: CPT

## 2023-06-17 PROCEDURE — 85610 PROTHROMBIN TIME: CPT | Performed by: EMERGENCY MEDICINE

## 2023-06-17 PROCEDURE — 99223 1ST HOSP IP/OBS HIGH 75: CPT | Performed by: INTERNAL MEDICINE

## 2023-06-17 PROCEDURE — 85025 COMPLETE CBC W/AUTO DIFF WBC: CPT | Performed by: EMERGENCY MEDICINE

## 2023-06-17 PROCEDURE — 36415 COLL VENOUS BLD VENIPUNCTURE: CPT | Performed by: EMERGENCY MEDICINE

## 2023-06-17 PROCEDURE — 84484 ASSAY OF TROPONIN QUANT: CPT | Performed by: INTERNAL MEDICINE

## 2023-06-17 PROCEDURE — 99284 EMERGENCY DEPT VISIT MOD MDM: CPT

## 2023-06-17 PROCEDURE — 83880 ASSAY OF NATRIURETIC PEPTIDE: CPT | Performed by: INTERNAL MEDICINE

## 2023-06-17 PROCEDURE — 85730 THROMBOPLASTIN TIME PARTIAL: CPT | Performed by: EMERGENCY MEDICINE

## 2023-06-17 PROCEDURE — G1004 CDSM NDSC: HCPCS

## 2023-06-17 PROCEDURE — 93005 ELECTROCARDIOGRAM TRACING: CPT

## 2023-06-17 RX ORDER — CLONAZEPAM 0.5 MG/1
0.5 TABLET ORAL 2 TIMES DAILY PRN
Status: DISCONTINUED | OUTPATIENT
Start: 2023-06-17 | End: 2023-06-19 | Stop reason: HOSPADM

## 2023-06-17 RX ORDER — PANTOPRAZOLE SODIUM 40 MG/1
40 TABLET, DELAYED RELEASE ORAL
Status: DISCONTINUED | OUTPATIENT
Start: 2023-06-17 | End: 2023-06-18

## 2023-06-17 RX ORDER — HEPARIN SODIUM 10000 [USP'U]/100ML
3-30 INJECTION, SOLUTION INTRAVENOUS
Status: DISCONTINUED | OUTPATIENT
Start: 2023-06-17 | End: 2023-06-18

## 2023-06-17 RX ORDER — GUAIFENESIN 600 MG/1
600 TABLET, EXTENDED RELEASE ORAL EVERY 12 HOURS SCHEDULED
Status: DISCONTINUED | OUTPATIENT
Start: 2023-06-17 | End: 2023-06-19 | Stop reason: HOSPADM

## 2023-06-17 RX ORDER — CLONAZEPAM 0.5 MG/1
1 TABLET ORAL 2 TIMES DAILY PRN
COMMUNITY
Start: 2023-05-16

## 2023-06-17 RX ORDER — CALCIUM CARBONATE 500 MG/1
500 TABLET, CHEWABLE ORAL DAILY PRN
Status: DISCONTINUED | OUTPATIENT
Start: 2023-06-17 | End: 2023-06-19 | Stop reason: HOSPADM

## 2023-06-17 RX ORDER — GUAIFENESIN/DEXTROMETHORPHAN 100-10MG/5
10 SYRUP ORAL EVERY 4 HOURS PRN
Status: DISCONTINUED | OUTPATIENT
Start: 2023-06-17 | End: 2023-06-19 | Stop reason: HOSPADM

## 2023-06-17 RX ORDER — ACETAMINOPHEN 325 MG/1
650 TABLET ORAL EVERY 6 HOURS PRN
Status: DISCONTINUED | OUTPATIENT
Start: 2023-06-17 | End: 2023-06-19 | Stop reason: HOSPADM

## 2023-06-17 RX ORDER — HEPARIN SODIUM 1000 [USP'U]/ML
5600 INJECTION, SOLUTION INTRAVENOUS; SUBCUTANEOUS ONCE
Status: COMPLETED | OUTPATIENT
Start: 2023-06-17 | End: 2023-06-17

## 2023-06-17 RX ADMIN — GUAIFENESIN 600 MG: 600 TABLET, EXTENDED RELEASE ORAL at 15:11

## 2023-06-17 RX ADMIN — HEPARIN SODIUM 18 UNITS/KG/HR: 10000 INJECTION, SOLUTION INTRAVENOUS at 09:56

## 2023-06-17 RX ADMIN — PANTOPRAZOLE SODIUM 40 MG: 40 TABLET, DELAYED RELEASE ORAL at 15:10

## 2023-06-17 RX ADMIN — IOHEXOL 85 ML: 350 INJECTION, SOLUTION INTRAVENOUS at 07:40

## 2023-06-17 RX ADMIN — ACETAMINOPHEN 650 MG: 325 TABLET ORAL at 20:34

## 2023-06-17 RX ADMIN — HEPARIN SODIUM 5600 UNITS: 1000 INJECTION INTRAVENOUS; SUBCUTANEOUS at 09:54

## 2023-06-17 RX ADMIN — ACETAMINOPHEN 650 MG: 325 TABLET ORAL at 10:29

## 2023-06-17 RX ADMIN — CLONAZEPAM 0.5 MG: 0.5 TABLET ORAL at 15:10

## 2023-06-17 NOTE — CASE MANAGEMENT
Case Management Discharge Planning Note    Patient name Jesus Morgan  Location [address] [address] MRN [id number]  : 1970 Date 2023       Current Admission Date: 2023  Current Admission Diagnosis:Pulmonary embolism [facility])   Patient Active Problem List    Diagnosis Date Noted   • DVT (deep venous thrombosis) ([facility] [address] ) 2023   • Leg pain 2023   • Pulmonary embolism ([facility] [address] ) 2023      LOS (days): 0  Geometric Mean LOS (GMLOS) (days):   Days to GMLOS:     OBJECTIVE:  Risk of Unplanned Readmission Score: 6 15     Current admission status: Inpatient   Preferred Pharmacy:   [facility] [address] [facility], [address] [place] [address]  Phone: [phone] Fax: [phone]    Primary Care Provider: Lindsay Shleby MD    Primary Insurance: BLUE CROSS  Secondary Insurance:     DISCHARGE DETAILS:    Discharge planning discussed with[de-identified] Patient  Freedom of Choice: Yes  Comments - Freedom of Choice: SW met briefly with pt to discuss plans and provide coupons for Eliquis  Pt's plan is to return home when discharged  Pt will be discharging on Eliquis  Dr Amy Goetz notified SW of need for price check/coupons for medication  Per pt her  was able to use coupons just recently for himself after orthopedic surgery so she is confident she will be able to as well  No other needs expressed or anticipated by pt at this time  Offered assistance in future if needed  Other Referral/Resources/Interventions Provided:  Interventions:  Other (Specify), Prescription Price Check (Eliquis coupons)    Treatment Team Recommendation: Home  Discharge Destination Plan[de-identified] Home  Transport at Discharge : Family

## 2023-06-17 NOTE — PLAN OF CARE
Problem: PAIN - ADULT  Goal: Verbalizes/displays adequate comfort level or baseline comfort level  Description: Interventions:  - Encourage patient to monitor pain and request assistance  - Assess pain using appropriate pain scale  - Administer analgesics based on type and severity of pain and evaluate response  - Implement non-pharmacological measures as appropriate and evaluate response  - Consider cultural and social influences on pain and pain management  - Notify physician/advanced practitioner if interventions unsuccessful or patient reports new pain  Outcome: Progressing     Problem: INFECTION - ADULT  Goal: Absence or prevention of progression during hospitalization  Description: INTERVENTIONS:  - Assess and monitor for signs and symptoms of infection  - Monitor lab/diagnostic results  - Monitor all insertion sites, i e  indwelling lines, tubes, and drains  - Monitor endotracheal if appropriate and nasal secretions for changes in amount and color  - [place] appropriate cooling/warming therapies per order  - Administer medications as ordered  - Instruct and encourage patient and family to use good hand hygiene technique  - Identify and instruct in appropriate isolation precautions for identified infection/condition  Outcome: Progressing  Goal: Absence of fever/infection during neutropenic period  Description: INTERVENTIONS:  - Monitor WBC    Outcome: Progressing     Problem: SAFETY ADULT  Goal: Patient will remain free of falls  Description: INTERVENTIONS:  - Educate patient/family on patient safety including physical limitations  - Instruct patient to call for assistance with activity   - Consult OT/PT to assist with strengthening/mobility   - Keep Call bell within reach  - Keep bed low and locked with side rails adjusted as appropriate  - Keep care items and personal belongings within reach  - Initiate and maintain comfort rounds  - Make Fall Risk Sign visible to staff  - Offer Toileting every 2 Hours, in advance of need  - Initiate/Maintain alarm  - Obtain necessary fall risk management equipment  - Apply yellow socks and bracelet for high fall risk patients  - Consider moving patient to room near nurses station  Outcome: Progressing  Goal: Maintain or return to baseline ADL function  Description: INTERVENTIONS:  -  Assess patient's ability to carry out ADLs; assess patient's baseline for ADL function and identify physical deficits which impact ability to perform ADLs (bathing, care of mouth/teeth, toileting, grooming, dressing, etc )  - Assess/evaluate cause of self-care deficits   - Assess range of motion  - Assess patient's mobility; develop plan if impaired  - Assess patient's need for assistive devices and provide as appropriate  - Encourage maximum independence but intervene and supervise when necessary  - Involve family in performance of ADLs  - Assess for home care needs following discharge   - Consider OT consult to assist with ADL evaluation and planning for discharge  - Provide patient education as appropriate  Outcome: Progressing  Goal: Maintains/Returns to pre admission functional level  Description: INTERVENTIONS:  - Perform BMAT or MOVE assessment daily    - Set and communicate daily mobility goal to care team and patient/family/caregiver  - Collaborate with rehabilitation services on mobility goals if consulted  - Perform Range of Motion 3 times a day  - Reposition patient every 2 hours    - Dangle patient 3 times a day  - Stand patient 3 times a day  - Ambulate patient 3 times a day  - Out of bed to chair 3 times a day   - Out of bed for meals 3 times a day  - Out of bed for toileting  - Record patient progress and toleration of activity level   Outcome: Progressing     Problem: DISCHARGE PLANNING  Goal: Discharge to home or other facility with appropriate resources  Description: INTERVENTIONS:  - Identify barriers to discharge w/patient and caregiver  - Arrange for needed discharge resources and transportation as appropriate  - Identify discharge learning needs (meds, wound care, etc )  - Arrange for interpretive services to assist at discharge as needed  - Refer to Case Management Department for coordinating discharge planning if the patient needs post-hospital services based on physician/advanced practitioner order or complex needs related to functional status, cognitive ability, or social support system  Outcome: Progressing     Problem: Knowledge Deficit  Goal: Patient/family/caregiver demonstrates understanding of disease process, treatment plan, medications, and discharge instructions  Description: Complete learning assessment and assess knowledge base    Interventions:  - Provide teaching at level of understanding  - Provide teaching via preferred learning methods  Outcome: Progressing

## 2023-06-17 NOTE — ED CARE HANDOFF
Emergency Department Sign Out Note        Sign out and transfer of care from Dr Hanna Germain  See Separate Emergency Department note  The patient, Susana Woodall, was evaluated by the previous provider for leg swelling, shortness of breath following hysterectomy  Workup Completed:  CBC, CMP, procalcitonin    ED Course / Workup Pending (followup): Signed out pending CTA PE study and venous duplex study of leg  CTA demonstrating a segmental PE  Patient remaining minimally symptomatic with normal vital signs in emergency department  Anticoagulation initiated  I updated patient on imaging findings and discussed patient with the hospitalist regarding admission                          Monica Ann Criteria for PE    Flowsheet Row Most Recent Value   Stephany Criteria for PE    Clinical signs and symptoms of DVT 3 Filed at: 06/17/2023 0559   PE is primary diagnosis or equally likely 3 Filed at: 06/17/2023 0559   HR >100 0 Filed at: 06/17/2023 0559   Immobilization at least 3 days or Surgery in the previous 4 weeks 1 5 Filed at: 06/17/2023 0559   Previous, objectively diagnosed PE or DVT 0 Filed at: 06/17/2023 0559   Hemoptysis 0 Filed at: 06/17/2023 0559   Malignancy with treatment within 6 months or palliative 0 Filed at: 06/17/2023 0559   Wells' Criteria Total 7 5 Filed at: 06/17/2023 9425        Wells' Criteria for DVT    Flowsheet Row Most Recent Value   Stephany Criteria for DVT    Active cancer Treatment or palliation within 6 months 0 Filed at: 06/17/2023 0171   Bedridden recently >3 days or major surgery within 12 weeks 1 Filed at: 06/17/2023 0796   Calf swelling >3 cm compared to the other leg 0 Filed at: 06/17/2023 3771   Entire leg swollen 0 Filed at: 06/17/2023 6213   Collateral (nonvaricose) superficial veins present 0 Filed at: 06/17/2023 9454   Localized tenderness along the deep venous system 1 Filed at: 06/17/2023 7119   Pitting edema, confined to symptomatic leg 0 Filed at: 06/17/2023 4228 Paralysis, paresis, or recent plaster immobilization of the lower extremity 0 Filed at: 06/17/2023 9853   Previously documented DVT 0 Filed at: 06/17/2023 2731   Alternative diagnosis to DVT as likely or more likely 0 Filed at: 06/17/2023 4592   Onur DVT Critera Score 2 Filed at: 06/17/2023 3849              ED Course as of 06/17/23 0913   Sat Jun 17, 2023   0741 52F with left calf pain and shortness of breath since recent hysterectomy 6/6 pending venous duplex and official read of CTA     CriticalCare Time    Date/Time: 6/17/2023 9:14 AM    Performed by: Mary Bui MD  Authorized by: Mary Bui MD    Critical care provider statement:     Critical care time (minutes):  32    Critical care was necessary to treat or prevent imminent or life-threatening deterioration of the following conditions:  Circulatory failure    Critical care was time spent personally by me on the following activities:  Re-evaluation of patient's condition, ordering and performing treatments and interventions, ordering and review of laboratory studies, development of treatment plan with patient or surrogate, examination of patient and review of old charts      MDM        Disposition  Final diagnoses:   Pulmonary embolism ([address] )     Time reflects when diagnosis was documented in both MDM as applicable and the Disposition within this note     Time User Action Codes Description Comment    6/17/2023  9:13 AM Davida Stew Add [[address]26 99] Pulmonary embolism [facility])       ED Disposition     ED Disposition   Admit    Condition   Stable    Date/Time   Sat Jun 17, 2023 0912    Comment   Case was discussed with HENRIQUE and the patient's admission status was agreed to be Admission Status: inpatient status to the service of Dr Ry Higginbotham   Follow-up Information    None       Patient's Medications   Discharge Prescriptions    No medications on file     No discharge procedures on file         ED Provider  Electronically Signed by Hay العلي MD  06/17/23 0333

## 2023-06-17 NOTE — PROGRESS NOTES
"   06/17/23 1459   Vital Signs   Temperature 98 1 °F (36 7 °C)   Pulse 62   Respirations 16   Blood Pressure 134/85   MAP (mmHg) 101   Oxygen Therapy   SpO2 98 %   Patient endorses 7/10 chest pressure described as a \"heavy\" sensation as well as anxiety and asking for clonazepam  VSS as above  EKG obtained--NSR without STT changes  Troponin obtained  Dr Ry Higginbotham made aware  Oxygen via NC placed for patient comfort  Will continue to monitor    "

## 2023-06-17 NOTE — H&P
History and Physical - [facility] Internal Medicine    Patient Information: Sascha Cosbyr 46 y o  female MRN: [id number]  Unit/Bed#: ED 10 Encounter: [id number]  Admitting Physician: Esthela Sorenson MD  PCP: Yolanda Nunes MD  Date of Admission:  06/17/23        Hospital Problem List:     Principal Problem:    Pulmonary embolism ([facility] )  Active Problems:    DVT (deep venous thrombosis) ([facility] )    S/P hysterectomy    Anemia    Gastroesophageal reflux disease without esophagitis    Anxiety    History of smoking      Assessment/Plan:    * Pulmonary embolism ([address] )  Assessment & Plan  Reported chest tightness, shortness of breath after hysterectomy on 6/6  CTA revealed moderate-sized pulmonary embolus crossing bifurcation of the lateral basal segment of the right lower lobe  Evidence of mild bronchitis with mild mucosal occlusion and some small subsegmental bronchi in left lung base  Hemodynamically stable, saturating adequately on room air  Provoked secondary to postoperative state,? Hormonal IUD and smoking contributed  · EKG without any acute abnormality  · proBNP, troponin negative  · IV heparin  · Monitor clinically  · 2D echo  · Discussed anticoagulation options, patient prefers NOACs as  was recently on Eliquis and tolerated well  Gastroesophageal reflux disease without esophagitis  Assessment & Plan  Patient reports intermittent reflux symptoms, with some worsening postoperatively  Also reports frequent NSAID use postoperatively  Denied any history of bleeding or recent endoscopic evaluation  · PPI  · Advised to avoid NSAIDs  · Monitor symptoms      Anemia  Assessment & Plan  Normocytic  Likely secondary to postoperative state and history of uterine bleeding  Patient is up-to-date with colonoscopy without any significant abnormality  · Iron study, B12, folate  · Monitor H&H      S/P hysterectomy  Assessment & Plan  On 6/6 for abnormal uterine bleeding, denies any history of malignancy    Denies any bleeding per surgery  Monitor      DVT (deep venous thrombosis) ([facility])  Assessment & Plan  Presented with left calf pain for 3 days  Venous Doppler left lower extremity revealed focal acute nonocclusive DVT noted in one of the paired posterior tibial vein in the calf  · Anticoagulation as above  · Monitor symptoms    Anxiety  Assessment & Plan  On clonazepam as needed  Verified from [address]    History of smoking  Assessment & Plan  Recently quit, encourage cessation  Continue nicotine patch          VTE Prophylaxis: Heparin Drip  / sequential compression device   Code Status: Level 1 - Full Code    Anticipated Length of Stay:  Patient will be admitted on an Inpatient basis with an anticipated length of stay of  >2 midnights  Justification for Hospital Stay: Pulmonary embolism    Total Time for Visit, including Counseling / Coordination of Care: 45 minutes  Greater than 50% of this total time spent on direct patient counseling and coordination of care  Chief Complaint:     Leg Pain (Reported of elective hysterectomy on 6/6  Co of L calf pain for few days when walking, and co of feeling can't take deep breath, denies CP  Someday smoker  )    History of Present Illness:    Cherry Gong is a 46 y o  female with history of anxiety, recent hysterectomy on 6/6, ex-smoker who presents with left calf pain and shortness of breath  Patient reported history of uterine bleeding and had IUD without improvement over the last year and subsequently underwent hysterectomy on 6/6  Patient also reported she had biopsy without any evidence of malignancy  Patient reported after surgery she felt that she was feeling short of breath and some chest tightness  Patient was initially thought that symptoms were related to just recovering from the surgery but her symptoms persisted and patient noted worsening with activity    Last Thursday she noticed having pain behind the left calf worsening with activity and she presented to ED for evaluation  In ED patient was noted to be afebrile with stable vital signs saturating adequately on room air  Work-up revealed mild anemia  CTA chest was done which revealed evidence of moderate-sized pulmonary embolus crossing the bifurcation of the lateral basal segment of right lower lobe  Evidence of mild bronchitis with mild mucous occlusion of some small subsegmental bronchi at left lung base  No evidence of pneumonia noted  Venous Doppler left lower extremity revealed focal acute nonocclusive DVT noted in one of the paired posterior tibial veins in the calf  Patient was started on IV heparin and was subsequently admitted  Patient denies any prior history of thromboembolism  She is unaware about her family history as she is adopted  Patient denied any recent traveling  Patient reports history of smoking she recently quit denies any use of hormonal medications but she had IUD over the last 1 year  Review of Systems:    Review of Systems   Constitutional: Positive for activity change  Negative for chills and fever  HENT: Negative for trouble swallowing  Mild soreness of throat after recent surgery under [place]   Respiratory: Positive for chest tightness and shortness of breath  Negative for cough  Cardiovascular: Negative for chest pain and leg swelling  Gastrointestinal: Negative for abdominal pain, blood in stool, diarrhea, nausea and vomiting  Heartburn   Genitourinary: Negative for difficulty urinating and hematuria  Allergic/Immunologic: Negative for immunocompromised state  Neurological: Negative for dizziness and light-headedness  Psychiatric/Behavioral: Negative for agitation         Past Medical and Surgical History:     Past Medical History:   Diagnosis Date   • Patient denies medical problems        Past Surgical History:   Procedure Laterality Date   • CHOLECYSTECTOMY     • TONSILLECTOMY         Meds/Allergies:    PTA meds:   Prior to Admission Medications "  Prescriptions Last Dose Informant Patient Reported? Taking? clonazePAM (KlonoPIN) 0 5 mg tablet   Yes Yes   Sig: Take 1 tablet by mouth 2 (two) times a day as needed   ibuprofen (MOTRIN) 600 mg tablet   No No   Sig: Take 1 tablet (600 mg total) by mouth every 6 (six) hours as needed for mild pain      Facility-Administered Medications: None       Allergies: No Known Allergies  History:     Marital Status: /Civil Union     Substance Use History:   Social History     Substance and Sexual Activity   Alcohol Use Yes    Comment: weekends     Social History     Tobacco Use   Smoking Status Some Days   Smokeless Tobacco Never   Tobacco Comments    1 pack a week     Social History     Substance and Sexual Activity   Drug Use Yes   • Types: Marijuana    Comment: occasionally       Family History:    Family History   Adopted: Yes       Physical Exam:     Vitals:   Blood Pressure: 159/90 (06/17/23 0554)  Pulse: 56 (06/17/23 0554)  Temperature: 97 8 °F (36 6 °C) (06/17/23 0554)  Temp Source: Oral (06/17/23 0554)  Respirations: 18 (06/17/23 0554)  Height: 5' 6\" (167 6 cm) (06/17/23 0554)  Weight - Scale: 70 3 kg (155 lb) (06/17/23 0554)  SpO2: 98 % (06/17/23 0554)    Physical Exam  Constitutional:       General: She is not in acute distress  HENT:      Head: Normocephalic and atraumatic  Mouth/Throat:      Mouth: Mucous membranes are moist    Eyes:      Pupils: Pupils are equal, round, and reactive to light  Cardiovascular:      Rate and Rhythm: Normal rate  Pulmonary:      Effort: Pulmonary effort is normal  No respiratory distress  Breath sounds: Normal breath sounds  No wheezing or rales  Comments: Diminished, clear to auscultation  Abdominal:      General: Bowel sounds are normal  There is no distension  Palpations: Abdomen is soft  Tenderness: There is no abdominal tenderness  There is no guarding or rebound     Musculoskeletal:         General: Tenderness (Left calf, with palpable " cord) present  Cervical back: Neck supple  Right lower leg: No edema  Left lower leg: No edema  Skin:     General: Skin is warm and dry  Findings: No rash  Neurological:      General: No focal deficit present  Mental Status: She is alert and oriented to person, place, and time  Mental status is at baseline  Psychiatric:         Mood and Affect: Mood normal                  Lab Results: I have personally reviewed pertinent reports  Results from last 7 days   Lab Units 06/17/23  0617   WBC Thousand/uL 6 73   HEMOGLOBIN g/dL 10 9*   HEMATOCRIT % 33 6*   PLATELETS Thousands/uL 379   NEUTROS PCT % 51   LYMPHS PCT % 33   MONOS PCT % 6   EOS PCT % 8*     Results from last 7 days   Lab Units 06/17/23  0617   POTASSIUM mmol/L 3 7   CHLORIDE mmol/L 106   CO2 mmol/L 25   BUN mg/dL 16   CREATININE mg/dL 0 82   CALCIUM mg/dL 9 0           Imaging: I have personally reviewed pertinent reports  CTA ED chest PE Study    Result Date: 6/17/2023  Narrative: CTA - CHEST WITH IV CONTRAST - PULMONARY ANGIOGRAM INDICATION:   Pulmonary embolism (PE) suspected, high prob ro PE high wells  History on 6/6/2023; left calf pain; can't take deep breath COMPARISON: 7/13/2020 TECHNIQUE: CTA examination of the chest was performed using angiographic technique according to a protocol specifically tailored to evaluate for pulmonary embolism  Multiplanar 2D reformatted images were created from the source data  In addition, coronal 3D MIP postprocessing was performed on the acquisition scanner  Radiation dose length product (DLP) for this visit:  465 mGy-cm   This examination, like all CT scans performed in the [facility], was performed utilizing techniques to minimize radiation dose exposure, including the use of iterative reconstruction and automated exposure control   IV Contrast:  85 mL of iohexol (OMNIPAQUE) FINDINGS: PULMONARY ARTERIAL TREE: Right lower lobe pulmonary emboli are noted in "the basilar segment 1 crossing a bifurcation on image 122 through 129  The RV LV ratio is not elevated measuring approximately 0 8  Romeo Kincaid LUNGS:  Lungs are clear  There is some minimal bronchial occlusion in the left lower lobe basilar bronchi suggesting some secretions without surrounding parenchymal abnormality and subsegmental bronchi which are new with slight thickening suggesting bronchitis with some secretions  PLEURA:  Unremarkable  HEART/GREAT VESSELS:  Unremarkable for patient's age  No thoracic aortic aneurysm  MEDIASTINUM AND PABLO:  Unremarkable  CHEST WALL AND LOWER NECK:   Unremarkable  VISUALIZED STRUCTURES IN THE UPPER ABDOMEN: The patient is status post cholecystectomy  OSSEOUS STRUCTURES:  No acute fracture or destructive osseous lesion  Impression: Evidence of moderate size pulmonary embolus crossing the bifurcation in the lateral basal segment of the right lower lobe  Evidence of mild bronchitis with mild mucous occlusion of a some small subsegmental bronchi at the left lung base  No evidence of pneumonia  This critical finding was discussed with Dr Elena Chin at 9:00 a m  Workstation performed: [id number]       CTA ED chest PE Study   Final Result      Evidence of moderate size pulmonary embolus crossing the bifurcation in the lateral basal segment of the right lower lobe  Evidence of mild bronchitis with mild mucous occlusion of a some small subsegmental bronchi at the left lung base  No evidence of pneumonia  This critical finding was discussed with Dr Elena Chin at 9:00 a m  Workstation performed: [id number]         VAS lower limb venous duplex study, unilateral/limited    (Results Pending)       EKG, Pathology, and Other Studies Reviewed on Admission:   · EKG- normal sinus rhythm at 60 bpm, QTc 424  Unchanged from the prior    Allscripts/EPIC Records Reviewed: Yes     ** Please Note:  \"This note has been constructed using a voice recognition " "system  Therefore there may be syntax, spelling, and/or grammatical errors  Please call if you have any questions   \"**    "

## 2023-06-17 NOTE — PROGRESS NOTES
Patient received clonazepam dose  States the chest pressure has subsided  Patient lying comfortably in bed without any s/s distress

## 2023-06-18 LAB
ANION GAP SERPL CALCULATED.3IONS-SCNC: 6 MMOL/L (ref 4–13)
APTT PPP: 71 SECONDS (ref 23–37)
BUN SERPL-MCNC: 12 MG/DL (ref 5–25)
CALCIUM SERPL-MCNC: 8 MG/DL (ref 8.4–10.2)
CHLORIDE SERPL-SCNC: 106 MMOL/L (ref 96–108)
CO2 SERPL-SCNC: 25 MMOL/L (ref 21–32)
CREAT SERPL-MCNC: 0.75 MG/DL (ref 0.6–1.3)
ERYTHROCYTE [DISTWIDTH] IN BLOOD BY AUTOMATED COUNT: 14.4 % (ref 11.6–15.1)
FERRITIN SERPL-MCNC: 17 NG/ML (ref 11–307)
FOLATE SERPL-MCNC: 15.8 NG/ML
GFR SERPL CREATININE-BSD FRML MDRD: 91 ML/MIN/1.73SQ M
GLUCOSE SERPL-MCNC: 108 MG/DL (ref 65–140)
HCT VFR BLD AUTO: 33 % (ref 34.8–46.1)
HGB BLD-MCNC: 10.5 G/DL (ref 11.5–15.4)
IRON SATN MFR SERPL: 8 % (ref 15–50)
IRON SERPL-MCNC: 26 UG/DL (ref 50–170)
MCH RBC QN AUTO: 27.6 PG (ref 26.8–34.3)
MCHC RBC AUTO-ENTMCNC: 31.8 G/DL (ref 31.4–37.4)
MCV RBC AUTO: 87 FL (ref 82–98)
PLATELET # BLD AUTO: 341 THOUSANDS/UL (ref 149–390)
PMV BLD AUTO: 9.8 FL (ref 8.9–12.7)
POTASSIUM SERPL-SCNC: 3.9 MMOL/L (ref 3.5–5.3)
RBC # BLD AUTO: 3.81 MILLION/UL (ref 3.81–5.12)
SODIUM SERPL-SCNC: 137 MMOL/L (ref 135–147)
TIBC SERPL-MCNC: 343 UG/DL (ref 250–450)
VIT B12 SERPL-MCNC: 705 PG/ML (ref 180–914)
WBC # BLD AUTO: 7.08 THOUSAND/UL (ref 4.31–10.16)

## 2023-06-18 PROCEDURE — 85027 COMPLETE CBC AUTOMATED: CPT | Performed by: INTERNAL MEDICINE

## 2023-06-18 PROCEDURE — 80048 BASIC METABOLIC PNL TOTAL CA: CPT | Performed by: INTERNAL MEDICINE

## 2023-06-18 PROCEDURE — 82746 ASSAY OF FOLIC ACID SERUM: CPT | Performed by: INTERNAL MEDICINE

## 2023-06-18 PROCEDURE — 85730 THROMBOPLASTIN TIME PARTIAL: CPT | Performed by: INTERNAL MEDICINE

## 2023-06-18 PROCEDURE — 82728 ASSAY OF FERRITIN: CPT | Performed by: INTERNAL MEDICINE

## 2023-06-18 PROCEDURE — 83540 ASSAY OF IRON: CPT | Performed by: INTERNAL MEDICINE

## 2023-06-18 PROCEDURE — 83550 IRON BINDING TEST: CPT | Performed by: INTERNAL MEDICINE

## 2023-06-18 PROCEDURE — 99232 SBSQ HOSP IP/OBS MODERATE 35: CPT | Performed by: INTERNAL MEDICINE

## 2023-06-18 PROCEDURE — 82607 VITAMIN B-12: CPT | Performed by: INTERNAL MEDICINE

## 2023-06-18 RX ORDER — ENOXAPARIN SODIUM 100 MG/ML
1 INJECTION SUBCUTANEOUS EVERY 12 HOURS SCHEDULED
Status: DISCONTINUED | OUTPATIENT
Start: 2023-06-18 | End: 2023-06-19 | Stop reason: HOSPADM

## 2023-06-18 RX ORDER — ALBUTEROL SULFATE 90 UG/1
2 AEROSOL, METERED RESPIRATORY (INHALATION) EVERY 4 HOURS PRN
Status: DISCONTINUED | OUTPATIENT
Start: 2023-06-18 | End: 2023-06-19 | Stop reason: HOSPADM

## 2023-06-18 RX ORDER — PANTOPRAZOLE SODIUM 40 MG/1
40 TABLET, DELAYED RELEASE ORAL
Status: DISCONTINUED | OUTPATIENT
Start: 2023-06-18 | End: 2023-06-19 | Stop reason: HOSPADM

## 2023-06-18 RX ADMIN — GUAIFENESIN 600 MG: 600 TABLET, EXTENDED RELEASE ORAL at 00:21

## 2023-06-18 RX ADMIN — HEPARIN SODIUM 18 UNITS/KG/HR: 10000 INJECTION, SOLUTION INTRAVENOUS at 03:48

## 2023-06-18 RX ADMIN — CLONAZEPAM 0.5 MG: 0.5 TABLET ORAL at 03:47

## 2023-06-18 RX ADMIN — ENOXAPARIN SODIUM 70 MG: 80 INJECTION SUBCUTANEOUS at 21:37

## 2023-06-18 RX ADMIN — ANTACID TABLETS 500 MG: 500 TABLET, CHEWABLE ORAL at 10:36

## 2023-06-18 RX ADMIN — ENOXAPARIN SODIUM 70 MG: 80 INJECTION SUBCUTANEOUS at 11:07

## 2023-06-18 RX ADMIN — IRON SUCROSE 200 MG: 20 INJECTION, SOLUTION INTRAVENOUS at 16:44

## 2023-06-18 RX ADMIN — GUAIFENESIN 600 MG: 600 TABLET, EXTENDED RELEASE ORAL at 21:38

## 2023-06-18 RX ADMIN — PANTOPRAZOLE SODIUM 40 MG: 40 TABLET, DELAYED RELEASE ORAL at 05:45

## 2023-06-18 RX ADMIN — GUAIFENESIN 600 MG: 600 TABLET, EXTENDED RELEASE ORAL at 08:27

## 2023-06-18 RX ADMIN — CLONAZEPAM 0.5 MG: 0.5 TABLET ORAL at 12:41

## 2023-06-18 RX ADMIN — PANTOPRAZOLE SODIUM 40 MG: 40 TABLET, DELAYED RELEASE ORAL at 16:44

## 2023-06-18 NOTE — ASSESSMENT & PLAN NOTE
Normocytic  Likely secondary to postoperative state and history of uterine bleeding  Patient is up-to-date with colonoscopy without any significant abnormality  Remained stable on anticoagulation  · Follow-up iron study evidence of iron deficiency, received Venofer 200 mg x 2 doses  · Normal, B12, folate  · Follow-up with PCP regarding monitoring  · GI referral on discharge

## 2023-06-18 NOTE — PLAN OF CARE
Problem: PAIN - ADULT  Goal: Verbalizes/displays adequate comfort level or baseline comfort level  Description: Interventions:  - Encourage patient to monitor pain and request assistance  - Assess pain using appropriate pain scale  - Administer analgesics based on type and severity of pain and evaluate response  - Implement non-pharmacological measures as appropriate and evaluate response  - Consider cultural and social influences on pain and pain management  - Notify physician/advanced practitioner if interventions unsuccessful or patient reports new pain  Outcome: Progressing     Problem: INFECTION - ADULT  Goal: Absence or prevention of progression during hospitalization  Description: INTERVENTIONS:  - Assess and monitor for signs and symptoms of infection  - Monitor lab/diagnostic results  - Monitor all insertion sites, i e  indwelling lines, tubes, and drains  - Monitor endotracheal if appropriate and nasal secretions for changes in amount and color  - [place] appropriate cooling/warming therapies per order  - Administer medications as ordered  - Instruct and encourage patient and family to use good hand hygiene technique  - Identify and instruct in appropriate isolation precautions for identified infection/condition  Outcome: Progressing  Goal: Absence of fever/infection during neutropenic period  Description: INTERVENTIONS:  - Monitor WBC    Outcome: Progressing     Problem: SAFETY ADULT  Goal: Patient will remain free of falls  Description: INTERVENTIONS:  - Educate patient/family on patient safety including physical limitations  - Instruct patient to call for assistance with activity   - Consult OT/PT to assist with strengthening/mobility   - Keep Call bell within reach  - Keep bed low and locked with side rails adjusted as appropriate  - Keep care items and personal belongings within reach  - Initiate and maintain comfort rounds  - Make Fall Risk Sign visible to staff  - Offer Toileting every 2 Hours, in advance of need  - Initiate/Maintain alarm  - Obtain necessary fall risk management equipment  - Apply yellow socks and bracelet for high fall risk patients  - Consider moving patient to room near nurses station  Outcome: Progressing  Goal: Maintain or return to baseline ADL function  Description: INTERVENTIONS:  -  Assess patient's ability to carry out ADLs; assess patient's baseline for ADL function and identify physical deficits which impact ability to perform ADLs (bathing, care of mouth/teeth, toileting, grooming, dressing, etc )  - Assess/evaluate cause of self-care deficits   - Assess range of motion  - Assess patient's mobility; develop plan if impaired  - Assess patient's need for assistive devices and provide as appropriate  - Encourage maximum independence but intervene and supervise when necessary  - Involve family in performance of ADLs  - Assess for home care needs following discharge   - Consider OT consult to assist with ADL evaluation and planning for discharge  - Provide patient education as appropriate  Outcome: Progressing  Goal: Maintains/Returns to pre admission functional level  Description: INTERVENTIONS:  - Perform BMAT or MOVE assessment daily    - Set and communicate daily mobility goal to care team and patient/family/caregiver  - Collaborate with rehabilitation services on mobility goals if consulted  - Perform Range of Motion 3 times a day  - Reposition patient every 2 hours    - Dangle patient 3 times a day  - Stand patient 3 times a day  - Ambulate patient 3 times a day  - Out of bed to chair 3 times a day   - Out of bed for meals 3 times a day  - Out of bed for toileting  - Record patient progress and toleration of activity level   Outcome: Progressing     Problem: DISCHARGE PLANNING  Goal: Discharge to home or other facility with appropriate resources  Description: INTERVENTIONS:  - Identify barriers to discharge w/patient and caregiver  - Arrange for needed discharge resources and transportation as appropriate  - Identify discharge learning needs (meds, wound care, etc )  - Arrange for interpretive services to assist at discharge as needed  - Refer to Case Management Department for coordinating discharge planning if the patient needs post-hospital services based on physician/advanced practitioner order or complex needs related to functional status, cognitive ability, or social support system  Outcome: Progressing     Problem: Knowledge Deficit  Goal: Patient/family/caregiver demonstrates understanding of disease process, treatment plan, medications, and discharge instructions  Description: Complete learning assessment and assess knowledge base    Interventions:  - Provide teaching at level of understanding  - Provide teaching via preferred learning methods  Outcome: Progressing

## 2023-06-18 NOTE — PLAN OF CARE
Problem: PAIN - ADULT  Goal: Verbalizes/displays adequate comfort level or baseline comfort level  Description: Interventions:  - Encourage patient to monitor pain and request assistance  - Assess pain using appropriate pain scale  - Administer analgesics based on type and severity of pain and evaluate response  - Implement non-pharmacological measures as appropriate and evaluate response  - Consider cultural and social influences on pain and pain management  - Notify physician/advanced practitioner if interventions unsuccessful or patient reports new pain  Outcome: Progressing     Problem: DISCHARGE PLANNING  Goal: Discharge to home or other facility with appropriate resources  Description: INTERVENTIONS:  - Identify barriers to discharge w/patient   - Arrange for needed discharge resources and transportation as appropriate  - Identify discharge learning needs   - Refer to Case Management Department for coordinating discharge planning if the patient needs post-hospital services based on physician/advanced practitioner order or complex needs related to functional status, cognitive ability, or social support system  Outcome: Progressing     Problem: Knowledge Deficit  Goal: Patient/family/caregiver demonstrates understanding of disease process, treatment plan, medications, and discharge instructions  Description: Complete learning assessment and assess knowledge base    Interventions:  - Provide teaching at level of understanding  - Provide teaching via preferred learning methods  Outcome: Progressing

## 2023-06-18 NOTE — ASSESSMENT & PLAN NOTE
Presented with left calf pain for 3 days  Venous Doppler left lower extremity revealed focal acute nonocclusive DVT noted in one of the paired posterior tibial vein in the calf  · Anticoagulation as above  · Leg elevation, compression stockings  · Monitor symptoms

## 2023-06-18 NOTE — ASSESSMENT & PLAN NOTE
On 6/6 for abnormal uterine bleeding, denies any history of malignancy    Denies any bleeding per surgery  Monitor

## 2023-06-18 NOTE — NURSING NOTE
06/18/23 11:18:26 -- 81 -- 110/79 89 98 % -- Standing for 3 minutes - Orthostatic VS   06/18/23 11:16:09 -- 77 -- 111/79 90 97 % -- Standing - Orthostatic VS   06/18/23 11:15:12 -- 55 -- 111/77 88 97 % -- Sitting - Orthostatic VS   06/18/23 11:14:11 -- 57 -- 119/74 89 95 % -- Lying - Orthostatic VS

## 2023-06-18 NOTE — ASSESSMENT & PLAN NOTE
Patient reported intermittent reflux symptoms, with some worsening postoperatively  Also reports frequent NSAID use postoperatively  Also reports having pain and reflux after swallowing  Denied any history of bleeding or recent endoscopic evaluation    · PPI daily  · Advised to avoid NSAIDs  · non ulcerogenic diet advised  · Monitor for bleeding  · GI referral on discharge for EGD  · Monitor symptoms

## 2023-06-18 NOTE — ASSESSMENT & PLAN NOTE
Reported chest tightness, shortness of breath after hysterectomy on 6/6  CTA revealed moderate-sized pulmonary embolus crossing bifurcation of the lateral basal segment of the right lower lobe  Evidence of mild bronchitis with mild mucosal occlusion and some small subsegmental bronchi in left lung base  Provoked secondary to postoperative state,? Hormonal IUD and smoking contributed  Clinically improving, vital stable  Saturating adequately on room air  Tolerating IV heparin without any evidence of bleeding  · EKG without any acute abnormality  · proBNP, serial troponin negative  · 2D echo with normal EF normal RV function  · Tolerated anticoagulation without any adverse effect  · Discussed anticoagulation options, patient prefers NOACs as  was recently on Eliquis and tolerated well    · Transition to Eliquis on discharge  · Mucinex, bronchodilators as needed  · Smoking cessation  · Pulmonary referral on discharge

## 2023-06-18 NOTE — PROGRESS NOTES
[address] Internal Medicine Progress Note  Patient: Sascha Stewart 46 y o  female   MRN: [id number]  PCP: Yolanda Nunes MD  Unit/Bed#: [address] [place] [address] Encounter: [id number]  Date Of Visit: 06/18/23    Problem List:    Principal Problem:    Pulmonary embolism ([facility] )  Active Problems:    DVT (deep venous thrombosis) ([facility] )    S/P hysterectomy    Anemia    Gastroesophageal reflux disease without esophagitis    Anxiety    History of smoking      Assessment & Plan:    * Pulmonary embolism ([facility] )  Assessment & Plan  Reported chest tightness, shortness of breath after hysterectomy on 6/6  CTA revealed moderate-sized pulmonary embolus crossing bifurcation of the lateral basal segment of the right lower lobe  Evidence of mild bronchitis with mild mucosal occlusion and some small subsegmental bronchi in left lung base  Provoked secondary to postoperative state,? Hormonal IUD and smoking contributed  Clinically improving, vital stable  Saturating adequately on room air  Tolerating IV heparin without any evidence of bleeding  · EKG without any acute abnormality  · proBNP, serial troponin negative  · We will transition IV heparin to therapeutic Lovenox  · Monitor clinically  · 2D echo  · Discussed anticoagulation options, patient prefers NOACs as  was recently on Eliquis and tolerated well  · Mucinex, bronchodilators as needed  · Smoking cessation  · Pulmonary referral on discharge      Gastroesophageal reflux disease without esophagitis  Assessment & Plan  Patient reported intermittent reflux symptoms, with some worsening postoperatively  Also reports frequent NSAID use postoperatively  Also reports having pain and reflux after swallowing  Denied any history of bleeding or recent endoscopic evaluation    · PPI, will change to twice daily for short duration  · Advised to avoid NSAIDs  · Known ulcerogenic diet  · Monitor for bleeding  · GI referral on discharge for EGD  · Monitor symptoms      Anemia  Assessment & Plan  Normocytic  Likely secondary to postoperative state and history of uterine bleeding  Patient is up-to-date with colonoscopy without any significant abnormality  Remained stable on anticoagulation  · Follow-up iron study, B12, folate  · Monitor H&H      S/P hysterectomy  Assessment & Plan  On 6/6 for abnormal uterine bleeding, denies any history of malignancy  Denies any bleeding per surgery  Monitor      DVT (deep venous thrombosis) ([facility])  Assessment & Plan  Presented with left calf pain for 3 days  Venous Doppler left lower extremity revealed focal acute nonocclusive DVT noted in one of the paired posterior tibial vein in the calf  · Anticoagulation as above  · Leg elevation, compression stockings  · Monitor symptoms    Anxiety  Assessment & Plan  On clonazepam as needed  Verified from [address]    History of smoking  Assessment & Plan  Recently quit, encourage cessation  Continue nicotine patch    Addendum  Iron studies noted and noted to have evidence of iron deficiency  We will give IV Venofer as discussed with patient previously  VTE Pharmacologic Prophylaxis: VTE Score: 5 Moderate Risk (Score 3-4) - Pharmacological DVT Prophylaxis Ordered: heparin drip  Positioning to Lovenox    Patient Centered Rounds: I performed bedside rounds with nursing staff today  Discussions with Specialists or Other Care Team Provider: yes    Education and Discussions with Family / Patient: yes  Discussed with  at bedside    Time Spent for Care: 45 minutes  More than 50% of total time spent on counseling and coordination of care as described above  Current Length of Stay: 1 day(s)  Current Patient Status: Inpatient   Certification Statement: The patient will continue to require additional inpatient hospital stay due to Pulmonary embolism  Discharge Plan: Anticipate discharge in 24-48 hrs to home      Code Status: Level 1 - Full Code    Subjective:   Reports having pain reflux symptoms, tightness after swallowing  Reports her breathing is stable  No nausea vomiting or bleeding  Denies any abdominal pain  Left calf discomfort is improving    Episode of chest pain and anxiety yesterday improved after clonazepam   EKG and cardiac markers negative  Reports improvement in GI symptoms after Protonix    Objective:     Vitals:   Temp (24hrs), Av °F (36 7 °C), Min:97 8 °F (36 6 °C), Max:98 3 °F (36 8 °C)    Temp:  [97 8 °F (36 6 °C)-98 3 °F (36 8 °C)] 97 9 °F (36 6 °C)  HR:  [] 61  Resp:  [16-20] 16  BP: ()/(51-85) 94/52  SpO2:  [96 %-99 %] 98 %  Body mass index is 25 02 kg/m²  Input and Output Summary (last 24 hours):   No intake or output data in the 24 hours ending 23 0852    Physical Exam:   Physical Exam  Constitutional:       General: She is not in acute distress  HENT:      Head: Normocephalic and atraumatic  Cardiovascular:      Rate and Rhythm: Normal rate  Pulmonary:      Effort: Pulmonary effort is normal  No respiratory distress  Breath sounds: Normal breath sounds  No wheezing or rales  Comments: Diminished bilaterally  Abdominal:      General: Bowel sounds are normal  There is no distension  Palpations: Abdomen is soft  Tenderness: There is no abdominal tenderness  There is no guarding or rebound  Musculoskeletal:         General: Tenderness (Left calf tenderness with palpable cord) present  Right lower leg: No edema  Left lower leg: No edema  Skin:     General: Skin is warm and dry  Findings: No erythema or rash  Neurological:      General: No focal deficit present  Mental Status: She is alert and oriented to person, place, and time  Mental status is at baseline     Psychiatric:         Mood and Affect: Mood normal          Additional Data:     Labs:  Results from last 7 days   Lab Units 23  0547 23  0617   WBC Thousand/uL 7 08 6 73   HEMOGLOBIN g/dL 10 5* 10 9*   HEMATOCRIT % 33 0* 33 6*   PLATELETS Thousands/uL 341 379 "  NEUTROS PCT %  --  51   LYMPHS PCT %  --  33   MONOS PCT %  --  6   EOS PCT %  --  8*     Results from last 7 days   Lab Units 06/18/23  0547   SODIUM mmol/L 137   POTASSIUM mmol/L 3 9   CHLORIDE mmol/L 106   CO2 mmol/L 25   BUN mg/dL 12   CREATININE mg/dL 0 75   ANION GAP mmol/L 6   CALCIUM mg/dL 8 0*   GLUCOSE RANDOM mg/dL 108     Results from last 7 days   Lab Units 06/17/23  0934   INR  0 96             Results from last 7 days   Lab Units 06/17/23  0617   PROCALCITONIN ng/ml <0 05       Lines/Drains:  Invasive Devices     Peripheral Intravenous Line  Duration           Peripheral IV 06/17/23 Left Antecubital 1 day                      Imaging: Reviewed radiology reports from this admission including: chest CT scan    Recent Cultures (last 7 days):         Last 24 Hours Medication List:   Current Facility-Administered Medications   Medication Dose Route Frequency Provider Last Rate   • acetaminophen  650 mg Oral Q6H PRN Yissel Hull MD     • calcium carbonate  500 mg Oral Daily PRN Yissel Hull MD     • clonazePAM  0 5 mg Oral BID PRN Yissel Hull MD     • dextromethorphan-guaiFENesin  10 mL Oral Q4H PRN Yissel Hull MD     • guaiFENesin  600 mg Oral Q12H Aaron Barrera MD     • heparin (porcine)  3-30 Units/kg/hr (Order-Specific) Intravenous Titrated Yissel Hull MD 18 Units/kg/hr (06/18/23 0348)   • nicotine  1 patch Transdermal Daily Yissel Hull MD     • pantoprazole  40 mg Oral Early Morning Yissel Hull MD          Today, Patient Was Seen By: Yissel Hull MD    ** Please Note: \"This note has been constructed using a voice recognition system  Therefore there may be syntax, spelling, and/or grammatical errors  Please call if you have any questions   \"**  "

## 2023-06-19 ENCOUNTER — APPOINTMENT (INPATIENT)
Dept: NON INVASIVE DIAGNOSTICS | Facility: HOSPITAL | Age: 53
DRG: 299 | End: 2023-06-19
Payer: COMMERCIAL

## 2023-06-19 VITALS
RESPIRATION RATE: 18 BRPM | HEART RATE: 86 BPM | TEMPERATURE: 97.4 F | DIASTOLIC BLOOD PRESSURE: 78 MMHG | WEIGHT: 155 LBS | BODY MASS INDEX: 24.91 KG/M2 | SYSTOLIC BLOOD PRESSURE: 106 MMHG | OXYGEN SATURATION: 94 % | HEIGHT: 66 IN

## 2023-06-19 LAB
ANION GAP SERPL CALCULATED.3IONS-SCNC: 7 MMOL/L (ref 4–13)
AORTIC ROOT: 3 CM
APICAL FOUR CHAMBER EJECTION FRACTION: 66 %
AV LVOT PEAK GRADIENT: 4 MMHG
AV PEAK GRADIENT: 6 MMHG
BUN SERPL-MCNC: 16 MG/DL (ref 5–25)
CALCIUM SERPL-MCNC: 8.3 MG/DL (ref 8.4–10.2)
CARDIAC TROPONIN I PNL SERPL HS: 3 NG/L (ref 8–18)
CHLORIDE SERPL-SCNC: 107 MMOL/L (ref 96–108)
CO2 SERPL-SCNC: 25 MMOL/L (ref 21–32)
CREAT SERPL-MCNC: 0.75 MG/DL (ref 0.6–1.3)
E WAVE DECELERATION TIME: 222 MS
ERYTHROCYTE [DISTWIDTH] IN BLOOD BY AUTOMATED COUNT: 14.2 % (ref 11.6–15.1)
FRACTIONAL SHORTENING: 37 (ref 28–44)
GFR SERPL CREATININE-BSD FRML MDRD: 91 ML/MIN/1.73SQ M
GLUCOSE SERPL-MCNC: 100 MG/DL (ref 65–140)
HCT VFR BLD AUTO: 32.2 % (ref 34.8–46.1)
HGB BLD-MCNC: 10.3 G/DL (ref 11.5–15.4)
INTERVENTRICULAR SEPTUM IN DIASTOLE (PARASTERNAL SHORT AXIS VIEW): 0.8 CM
INTERVENTRICULAR SEPTUM: 0.8 CM (ref 0.6–1.1)
LAAS-AP2: 19.7 CM2
LAAS-AP4: 19.9 CM2
LEFT ATRIUM SIZE: 3.6 CM
LEFT INTERNAL DIMENSION IN SYSTOLE: 2.7 CM (ref 2.1–4)
LEFT VENTRICULAR INTERNAL DIMENSION IN DIASTOLE: 4.3 CM (ref 3.5–6)
LEFT VENTRICULAR POSTERIOR WALL IN END DIASTOLE: 0.9 CM
LEFT VENTRICULAR STROKE VOLUME: 57 ML
LVSV (TEICH): 57 ML
MCH RBC QN AUTO: 27.8 PG (ref 26.8–34.3)
MCHC RBC AUTO-ENTMCNC: 32 G/DL (ref 31.4–37.4)
MCV RBC AUTO: 87 FL (ref 82–98)
MV E'TISSUE VEL-SEP: 10 CM/S
MV PEAK A VEL: 0.74 M/S
MV PEAK E VEL: 97 CM/S
MV STENOSIS PRESSURE HALF TIME: 64 MS
MV VALVE AREA P 1/2 METHOD: 3.44
PLATELET # BLD AUTO: 322 THOUSANDS/UL (ref 149–390)
PMV BLD AUTO: 10 FL (ref 8.9–12.7)
POTASSIUM SERPL-SCNC: 3.7 MMOL/L (ref 3.5–5.3)
RBC # BLD AUTO: 3.7 MILLION/UL (ref 3.81–5.12)
RIGHT ATRIUM AREA SYSTOLE A4C: 13.4 CM2
RIGHT VENTRICLE ID DIMENSION: 3.2 CM
SL CV LEFT ATRIUM LENGTH A2C: 5.3 CM
SL CV LV EF: 65
SL CV PED ECHO LEFT VENTRICLE DIASTOLIC VOLUME (MOD BIPLANE) 2D: 84 ML
SL CV PED ECHO LEFT VENTRICLE SYSTOLIC VOLUME (MOD BIPLANE) 2D: 27 ML
SODIUM SERPL-SCNC: 139 MMOL/L (ref 135–147)
TR MAX PG: 25 MMHG
TR PEAK VELOCITY: 2.5 M/S
TRICUSPID ANNULAR PLANE SYSTOLIC EXCURSION: 2.2 CM
TRICUSPID VALVE PEAK REGURGITATION VELOCITY: 2.52 M/S
WBC # BLD AUTO: 7.82 THOUSAND/UL (ref 4.31–10.16)

## 2023-06-19 PROCEDURE — 85027 COMPLETE CBC AUTOMATED: CPT | Performed by: INTERNAL MEDICINE

## 2023-06-19 PROCEDURE — 99239 HOSP IP/OBS DSCHRG MGMT >30: CPT | Performed by: INTERNAL MEDICINE

## 2023-06-19 PROCEDURE — 93306 TTE W/DOPPLER COMPLETE: CPT | Performed by: INTERNAL MEDICINE

## 2023-06-19 PROCEDURE — 93306 TTE W/DOPPLER COMPLETE: CPT

## 2023-06-19 PROCEDURE — 93005 ELECTROCARDIOGRAM TRACING: CPT

## 2023-06-19 PROCEDURE — 80048 BASIC METABOLIC PNL TOTAL CA: CPT | Performed by: INTERNAL MEDICINE

## 2023-06-19 PROCEDURE — 84484 ASSAY OF TROPONIN QUANT: CPT | Performed by: INTERNAL MEDICINE

## 2023-06-19 RX ORDER — PANTOPRAZOLE SODIUM 40 MG/1
40 TABLET, DELAYED RELEASE ORAL DAILY
Qty: 30 TABLET | Refills: 0 | Status: SHIPPED | OUTPATIENT
Start: 2023-06-19

## 2023-06-19 RX ORDER — ACETAMINOPHEN 325 MG/1
650 TABLET ORAL EVERY 6 HOURS PRN
Refills: 0
Start: 2023-06-19

## 2023-06-19 RX ORDER — ALBUTEROL SULFATE 90 UG/1
2 AEROSOL, METERED RESPIRATORY (INHALATION) EVERY 6 HOURS PRN
Qty: 8.5 G | Refills: 0 | Status: SHIPPED | OUTPATIENT
Start: 2023-06-19

## 2023-06-19 RX ADMIN — ENOXAPARIN SODIUM 70 MG: 80 INJECTION SUBCUTANEOUS at 08:40

## 2023-06-19 RX ADMIN — CLONAZEPAM 0.5 MG: 0.5 TABLET ORAL at 04:35

## 2023-06-19 RX ADMIN — CLONAZEPAM 0.5 MG: 0.5 TABLET ORAL at 12:57

## 2023-06-19 RX ADMIN — ACETAMINOPHEN 650 MG: 325 TABLET ORAL at 04:35

## 2023-06-19 RX ADMIN — IRON SUCROSE 200 MG: 20 INJECTION, SOLUTION INTRAVENOUS at 08:40

## 2023-06-19 RX ADMIN — GUAIFENESIN 600 MG: 600 TABLET, EXTENDED RELEASE ORAL at 08:41

## 2023-06-19 RX ADMIN — PANTOPRAZOLE SODIUM 40 MG: 40 TABLET, DELAYED RELEASE ORAL at 07:16

## 2023-06-19 NOTE — INCIDENTAL FINDINGS
The following findings require follow up:  Radiographic finding   Finding: LEFT LOWER LIMB:  Evidence of focal acute non-occlusive deep vein thrombosis noted in one of the  paired posterior tibial veins in the calf     Follow up required: yes     Please notify the following clinician to assist with the follow up:   Dr Terri Goncalves MD

## 2023-06-19 NOTE — NURSING NOTE
Patient left unit in stable condition accompanied by PCA with all belongings  IV removed and occlusive dressing applied  AVS, all labs and imaging, and all discharge instructions thoroughly reviewed with patient and were well understood

## 2023-06-19 NOTE — ED PROVIDER NOTES
Final Diagnosis:  1  Pulmonary embolism ([address] )    2  DVT (deep venous thrombosis) [facility])        Chief Complaint   Patient presents with   • Leg Pain     Reported of elective hysterectomy on 6/6  Co of L calf pain for few days when walking, and co of feeling can't take deep breath, denies CP  Someday smoker  HPI  Patient presents w/ leg pain and swelling and dyspnea w/ deep inspiration  She had elective hysterectomy 6/6  No anticoagulation at this time  No prior clots  No hemoptysis  Not tachycardic or toxic appearing  Nonexertional  Not positional beyond deep inspiration  No fever/cough  Was intubated  Smokes  No contibutory cardiac/resp history  No abnormal bleeding  Measure leg and it's 1cm larger on left  Was also in extended car ride going to [employer] [place] caught in traffic >3hr  EMS reports if applicable: N/A     - Previous charting underwent limited review with attention to last ED visits, labs, ekgs, and prior imaging  Chart review reveals :     Lab on 09/14/2022   Component Date Value Ref Range Status   • ds DNA Ab 09/14/2022 <1  0 - 9 IU/mL Final                                       Negative      <5                                     Equivocal  5 - 9                                     Positive      >9   • CROW [place] (SM) Ab 09/14/2022 <0 2  0 0 - 0 9 AI Final   • CROW RNP Ab 09/14/2022 <0 2  0 0 - 0 9 AI Final   • Scleroderma SCL-70 09/14/2022 <0 2  0 0 - 0 9 AI Final   • Anti-Centromere B Antibodies 09/14/2022 <0 2  0 0 - 0 9 AI Final   • C3 Complement 09/14/2022 104 0  90 0 - 180 0 mg/dL Final   • C4, COMPLEMENT 09/14/2022 23 0  10 0 - 40 0 mg/dL Final       - No language barrier    - History obtained from patient   - There are no limitations to the history obtained  - Discuss patient's care, with patient permission or by chart review, with her , sharmila     PMH:   has a past medical history of Patient denies medical problems      PSH:   has a past surgical history that includes Cholecystectomy and Tonsillectomy  Social History:  Tobacco Use: High Risk (6/17/2023)    Patient History    • Smoking Tobacco Use: Some Days    • Smokeless Tobacco Use: Never    • Passive Exposure: Not on file     Alcohol Use: Heavy Drinker (11/24/2019)    AUDIT-C    • Frequency of Alcohol Consumption: 2-3 times a week    • Average Number of Drinks: 3 or 4    • Frequency of Binge Drinking: Less than monthly     No illicit use       ROS:  Pertinent positives/negatives: Romeo Kincaid Some ROS may be present in the HPI and would take precedent over these standard questions asked below  Review of Systems   Respiratory: Positive for shortness of breath  Cardiovascular: Positive for leg swelling  CONSTITUTIONAL:  No lethargy  No weakness  No unexpected weight loss  No appetite change  EYES:  No pain, redness, or discharge  No loss of vision  No orbital trauma or pain  ENT:  No tinnitus or decreased hearing  No epistaxis/purulent rhinorrhea  No voice change, airway closing, trismus  CARDIOVASCULAR:  No chest pain  No skin mottling or pallor  No change in exertional capacity  RESPIRATORY:  No hemoptysis  No paroxysmal nocturnal dyspnea  No stridor  No audible wheezing  No production with cough  GASTROINTESTINAL:  Normal appetite  No vomiting, diarrhea  No pain  No bloating  No melena  No hematochezia  GENITOURINARY:  No frequency, urgency, nocturia  No hematuria or dysuria  No discharge  No sores/adenopathy  MUSCULOSKELETAL:  No arthralgias or myalgias that are new  No new deformity  INTEGUMENTARY:  No swelling  No unexpected contusions  No abrasions  No lymphangitis  NEUROLOGIC:  No meningismus  No new numbness of the extremities  No new focal weakness  No postural instability  PSYCHIATRIC:  No SI HI AVH  HEMATOLOGICAL:  No bleeding  No petechiae  No bruising  ALLERGIES:  No urticaria  No sudden abd cramping  No stridor      PE:     Physical exam highlights:   Physical Exam       Vitals:    06/18/23 1509 06/18/23 1510 06/18/23 1934 06/18/23 2305   BP: 95/61  93/58 91/50   BP Location:       Pulse:  77 72 83   Resp: 14  16 18   Temp: 98 4 °F (36 9 °C)  98 °F (36 7 °C) 98 2 °F (36 8 °C)   TempSrc: Oral      SpO2:  95% 96% 93%   Weight:       Height:         Vitals reviewed by me  Nursing note reviewed  Chaperone present for all sensitive exam   Const: No acute distress  Alert  Nontoxic  Not diaphoretic  HEENT: External ears normal  No protrusion drainage swelling  Nose normal  No drainage/traumatic deformity  MMM  Mouth with baseline/symmetric movement  No trismus  Eyes: No squinting  No icterus  No tearing/swelling/drainage  Tracks through the room with normal EOM  Neck: ROM normal  No rigidity  No meningismus  Cards: Rate as per vitals Compared to monitor sinus unless documented  Regular Well perfused  Pulm: able to verbalize without additional effort  Effort and excursion normal  No distress  No audible wheezing/ stridor  Normal resp rate without retraction or change in work of breathing  Abd: No distension beyond baseline  No fluctuant wave  Patient without peritoneal pain with shifting/bumping the bed  MSK: ROM normal baseline  No deformity  No contractures from baseline  Skin: No new rashes visible  Well perfused  No wounds visualized on exposed skin  Asymmetric left  Larger than right calf  Bilateral equal breath sounds   Neuro: Nonfocal  Baseline  CN grossly intact  Moving all four with coordination  Psych: Normal behavior and affect  A:  - Nursing note reviewed  Ddx and MDM  Considered diagnoses  R/o provoked DVT/PE  Needs vascular study and CTA PE    High risk PE    R/o post intubation pneumonia, procal, cbc CT abd pelv    Check kidney function tolerate CTA as well as help w/ anticoag    Check CBC r/o post op bleeding anemia though none reported  Check platelets before anticoagulation           My conversation with consultant reveals: NA       Decision rules:         Monika Burns Criteria for PE    Flowsheet Row Most Recent Value   Wells' Criteria for PE    Clinical signs and symptoms of DVT 3 Filed at: 06/17/2023 0559   PE is primary diagnosis or equally likely 3 Filed at: 06/17/2023 0559   HR >100 0 Filed at: 06/17/2023 0559   Immobilization at least 3 days or Surgery in the previous 4 weeks 1 5 Filed at: 06/17/2023 0559   Previous, objectively diagnosed PE or DVT 0 Filed at: 06/17/2023 0559   Hemoptysis 0 Filed at: 06/17/2023 0559   Malignancy with treatment within 6 months or palliative 0 Filed at: 06/17/2023 0559   Onur' Criteria Total 7 5 Filed at: 06/17/2023 0559                         My read of the XR/CT scan reveals:  NA   VAS lower limb venous duplex study, unilateral/limited   Final Result      CTA ED chest PE Study   Final Result      Evidence of moderate size pulmonary embolus crossing the bifurcation in the lateral basal segment of the right lower lobe  Evidence of mild bronchitis with mild mucous occlusion of a some small subsegmental bronchi at the left lung base  No evidence of pneumonia  This critical finding was discussed with Dr Dajuan Tim at 9:00 a m  Workstation performed: [id number]             Orders Placed This Encounter   Procedures   • Critical Care   • CTA ED chest PE Study   • CBC and differential   • Basic metabolic panel   • Procalcitonin   • APTT   • Protime-INR   • HS Troponin 0hr (reflex protocol)   • B-Type Natriuretic Peptide(BNP)   • APTT   • CBC   • Basic metabolic panel   • APTT   • HS Troponin I 2hr   • HS Troponin I 4hr   • APTT   • Vitamin B12   • Folate   • Iron Saturation %   • Ferritin   • CBC   • Basic metabolic panel   • Diet Regular; Regular House; Non Ulcerogenic   • Nursing communication Continue IV as ordered     • Notify admitting physician   • Notify admitting physician on arrival   • Vital Signs per unit routine   • Up and OOB as tolerated   • Insert peripheral IV   • Maintain IV access   • Incentive spirometry   • Orthostatic blood pressure   • Check Pulse Oximetry while ambulating   • Ambulate patient   • Apply Knee High Compression Stockings   • Level 1-Full Code: all life saving measures are indicated   • Room Service   • Respiratory Protocol   • ECG 12 lead   • Echo complete w/ contrast if indicated   • INPATIENT ADMISSION     Labs Reviewed   CBC AND DIFFERENTIAL - Abnormal       Result Value Ref Range Status    WBC 6 73  4 31 - 10 16 Thousand/uL Final    RBC 3 91  3 81 - 5 12 Million/uL Final    Hemoglobin 10 9 (*) 11 5 - 15 4 g/dL Final    Hematocrit 33 6 (*) 34 8 - 46 1 % Final    MCV 86  82 - 98 fL Final    MCH 27 9  26 8 - 34 3 pg Final    MCHC 32 4  31 4 - 37 4 g/dL Final    RDW 14 4  11 6 - 15 1 % Final    MPV 9 2  8 9 - 12 7 fL Final    Platelets 653  907 - 390 Thousands/uL Final    nRBC 0  /100 WBCs Final    Neutrophils Relative 51  43 - 75 % Final    Immat GRANS % 0  0 - 2 % Final    Lymphocytes Relative 33  14 - 44 % Final    Monocytes Relative 6  4 - 12 % Final    Eosinophils Relative 8 (*) 0 - 6 % Final    Basophils Relative 2 (*) 0 - 1 % Final    Neutrophils Absolute 3 45  1 85 - 7 62 Thousands/µL Final    Immature Grans Absolute 0 03  0 00 - 0 20 Thousand/uL Final    Lymphocytes Absolute 2 23  0 60 - 4 47 Thousands/µL Final    Monocytes Absolute 0 39  0 17 - 1 22 Thousand/µL Final    Eosinophils Absolute 0 52  0 00 - 0 61 Thousand/µL Final    Basophils Absolute 0 11 (*) 0 00 - 0 10 Thousands/µL Final   PROCALCITONIN TEST - Normal    Procalcitonin <0 05  <=0 25 ng/ml Final    Comment: Suspected Lower Respiratory Tract Infection (LRTI):  - LESS than or EQUAL to 0 25 ng/mL:   low likelihood for bacterial LRTI; antibiotics DISCOURAGED   - GREATER than 0 25 ng/mL:   increased likelihood for bacterial LRTI; antibiotics ENCOURAGED  Suspected Sepsis:  - Strongly consider initiating antibiotics in ALL UNSTABLE patients    - LESS than or EQUAL to 0 5 ng/mL:   low likelihood for bacterial sepsis; antibiotics DISCOURAGED   - GREATER than 0 5 ng/mL:   increased likelihood for bacterial sepsis; antibiotics ENCOURAGED   - GREATER than 2 ng/mL:   high risk for severe sepsis / septic shock; antibiotics strongly ENCOURAGED  Decisions on antibiotic use should not be based solely on Procalcitonin (PCT) levels  If PCT is low but uncertainty exists with stopping antibiotics, repeat PCT in 6-24 hours to confirm the low level  If antibiotics are administered (regardless if initial PCT was high or low), repeat PCT every 1-2 days to consider early antibiotic cessation (when GREATER than 80% decrease from the peak OR when PCT drops below designated cutoffs, whichever comes first), so long as the infection is NOT one that typically requires prolonged treatment durations (e g , bone/joint infections, endocarditis, Staph  aureus bacteremia)      Situations of FALSE-POSITIVE Procalcitonin values:  1) Newborns < 67 hours old  2) Massive stress from severe trauma / burns, major surgery, acute pancreatitis, cardiogenic / hemorrhagic shock, sickle cell crisis, or other organ perfusion abnormalities  3) Malaria and some Candidal infections  4) Treatment with agents that stimulate cytokines (e g , OKT3, anti-lymphocyte globulins, alemtuzumab, IL-2, granulocyte transfusion [NOT GCSFs])  5) Chronic renal disease causes elevated baseline levels (consider GREATER than 0 75 ng/mL as an abnormal cut-off); initiating HD/CRRT may cause transient decreases  6) Paraneoplastic syndromes from medullary thyroid or SCLC, some forms of vasculitis, and acute nmxtn-ib-kuvc disease    Situations of FALSE-NEGATIVE Procalcitonin values:  1) Too early in clinical course for PCT to have reached its peak (may repeat in 6-24 hours to confirm low level)  2) Localized infection WITHOUT systemic (SIRS / sepsis) response (e g , an abscess, osteomyelitis, cystitis)  3) Mycobacteria (e g , Tuberculosis, MAC)  4) Cystic fibrosis exacerbations     APTT - Normal PTT 25  23 - 37 seconds Final    Comment: Therapeutic Heparin Range =  60-90 seconds   PROTIME-INR - Normal    Protime 12 9  11 6 - 14 5 seconds Final    INR 0 96  0 84 - 1 19 Final   BASIC METABOLIC PANEL    Sodium 283  135 - 147 mmol/L Final    Potassium 3 7  3 5 - 5 3 mmol/L Final    Chloride 106  96 - 108 mmol/L Final    CO2 25  21 - 32 mmol/L Final    ANION GAP 7  4 - 13 mmol/L Final    BUN 16  5 - 25 mg/dL Final    Creatinine 0 82  0 60 - 1 30 mg/dL Final    Comment: Standardized to IDMS reference method    Glucose 103  65 - 140 mg/dL Final    Comment: If the patient is fasting, the ADA then defines impaired fasting glucose as > 100 mg/dL and diabetes as > or equal to 123 mg/dL  Calcium 9 0  8 4 - 10 2 mg/dL Final    eGFR 82  ml/min/1 73sq m Final    Narrative:     [place] guidelines for Chronic Kidney Disease (CKD):   •  Stage 1 with normal or high GFR (GFR > 90 mL/min/1 73 square meters)  •  Stage 2 Mild CKD (GFR = 60-89 mL/min/1 73 square meters)  •  Stage 3A Moderate CKD (GFR = 45-59 mL/min/1 73 square meters)  •  Stage 3B Moderate CKD (GFR = 30-44 mL/min/1 73 square meters)  •  Stage 4 Severe CKD (GFR = 15-29 mL/min/1 73 square meters)  •  Stage 5 End Stage CKD (GFR <15 mL/min/1 73 square meters)  Note: GFR calculation is accurate only with a steady state creatinine       *Each of these labs was reviewed  Particular standout labs will be noted in the ED Course above     Final Diagnosis:  1  Pulmonary embolism ([address] )    2   DVT (deep venous thrombosis) ([facility])          P:  - hospital tx includes   Medications   nicotine (NICODERM CQ) 7 mg/24hr TD 24 hr patch 1 patch (1 patch Transdermal Not Given 6/18/23 7055)   acetaminophen (TYLENOL) tablet 650 mg (650 mg Oral Given 6/19/23 0435)   calcium carbonate (TUMS) chewable tablet 500 mg (500 mg Oral Given 6/18/23 1036)   guaiFENesin (MUCINEX) 12 hr tablet 600 mg (600 mg Oral Given 6/18/23 2138)   dextromethorphan-guaiFENesin (ROBITUSSIN DM) oral syrup 10 mL (has no administration in time range)   clonazePAM (KlonoPIN) tablet 0 5 mg (0 5 mg Oral Given 6/19/23 0435)   enoxaparin (LOVENOX) subcutaneous injection 70 mg (70 mg Subcutaneous Given 6/18/23 2137)   pantoprazole (PROTONIX) EC tablet 40 mg (40 mg Oral Given 6/18/23 1644)   albuterol (PROVENTIL HFA,VENTOLIN HFA) inhaler 2 puff (has no administration in time range)   iron sucrose (VENOFER) 200 mg in sodium chloride 0 9 % 100 mL IVPB (0 mg Intravenous Stopped 6/18/23 4574)   iohexol (OMNIPAQUE) 350 MG/ML injection (SINGLE-DOSE) 100 mL (85 mL Intravenous Given 6/17/23 2640)   heparin (porcine) injection 5,600 Units (5,600 Units Intravenous Given 6/17/23 0958)         - disposition  Time reflects when diagnosis was documented in both MDM as applicable and the Disposition within this note     Time User Action Codes Description Comment    6/17/2023  9:13 AM Davida Rout Add [I26 99] Pulmonary embolism ([address] )     6/17/2023  9:53 AM Davida Rout Add [I82 409] DVT (deep venous thrombosis) [facility])       ED Disposition     ED Disposition   Admit    Condition   Stable    Date/Time   Sat Jun 17, 2023  9:12 AM    Comment   Case was discussed with HENRIQUE and the patient's admission status was agreed to be Admission Status: inpatient status to the service of Dr Ry Higginbotham   Follow-up Information    None         - patient will call their PCP to let them know they were in the emergency department  We discuss return precautions and patient is agreeable with plan and aformentioned disposition  - additional treatment intended, if consistent with primary provider:  - patient to follow with :      Current Discharge Medication List      START taking these medications    Details   apixaban (Eliquis) 5 mg Take 2 tablets (10 mg total) by mouth 2 (two) times a day for 7 days, THEN 1 tablet (5 mg total) 2 (two) times a day for 23 days    Qty: 74 tablet, Refills: 0    Comments: Eliquis "Starter Pack  Associated Diagnoses: Pulmonary embolism ([address] ); DVT (deep venous thrombosis) ([facility])         CONTINUE these medications which have NOT CHANGED    Details   clonazePAM (KlonoPIN) 0 5 mg tablet Take 1 tablet by mouth 2 (two) times a day as needed         STOP taking these medications       ibuprofen (MOTRIN) 600 mg tablet Comments:   Reason for Stopping:             No discharge procedures on file  Prior to Admission Medications   Prescriptions Last Dose Informant Patient Reported? Taking? clonazePAM (KlonoPIN) 0 5 mg tablet   Yes Yes   Sig: Take 1 tablet by mouth 2 (two) times a day as needed   ibuprofen (MOTRIN) 600 mg tablet   No No   Sig: Take 1 tablet (600 mg total) by mouth every 6 (six) hours as needed for mild pain      Facility-Administered Medications: None       Portions of the record may have been created with voice recognition software  Occasional wrong word or \"sound a like\" substitutions may have occurred due to the inherent limitations of voice recognition software  Read the chart carefully and recognize, using context, where substitutions have occurred      Electronically signed by:  MD Edilberto Brown MD  06/19/23 2978    "

## 2023-06-19 NOTE — INCIDENTAL FINDINGS
The following findings require follow up:  Radiographic finding   Finding:   CT scan of the chest revealed evidence of moderate-sized pulmonary embolus crossing bifurcation of the lateral basal segment of right lower lobe  CT scan also revealed mild bronchitis with mild mucous occlusion of left lung base   Follow up required: Yes   Follow up should be done within 1 month(s)    Please notify the following clinician to assist with the follow up:   Dr Constantino Matute MD and pulmonology

## 2023-06-19 NOTE — PLAN OF CARE
Problem: PAIN - ADULT  Goal: Verbalizes/displays adequate comfort level or baseline comfort level  Description: Interventions:  - Encourage patient to monitor pain and request assistance  - Assess pain using appropriate pain scale  - Administer analgesics based on type and severity of pain and evaluate response  - Implement non-pharmacological measures as appropriate and evaluate response  - Consider cultural and social influences on pain and pain management  - Notify physician/advanced practitioner if interventions unsuccessful or patient reports new pain  Outcome: Progressing     Problem: INFECTION - ADULT  Goal: Absence or prevention of progression during hospitalization  Description: INTERVENTIONS:  - Assess and monitor for signs and symptoms of infection  - Monitor lab/diagnostic results  - Monitor all insertion sites, i e  indwelling lines, tubes, and drains  - Monitor endotracheal if appropriate and nasal secretions for changes in amount and color  - Bonita appropriate cooling/warming therapies per order  - Administer medications as ordered  - Instruct and encourage patient and family to use good hand hygiene technique  - Identify and instruct in appropriate isolation precautions for identified infection/condition  Outcome: Progressing  Goal: Absence of fever/infection during neutropenic period  Description: INTERVENTIONS:  - Monitor WBC    Outcome: Progressing     Problem: SAFETY ADULT  Goal: Patient will remain free of falls  Description: INTERVENTIONS:  - Educate patient/family on patient safety including physical limitations  - Instruct patient to call for assistance with activity   - Consult OT/PT to assist with strengthening/mobility   - Keep Call bell within reach  - Keep bed low and locked with side rails adjusted as appropriate  - Keep care items and personal belongings within reach  - Initiate and maintain comfort rounds  - Make Fall Risk Sign visible to staff  - Offer Toileting every 2 Hours, in advance of need  - Initiate/Maintain alarm  - Obtain necessary fall risk management equipment  - Apply yellow socks and bracelet for high fall risk patients  - Consider moving patient to room near nurses station  Outcome: Progressing  Goal: Maintain or return to baseline ADL function  Description: INTERVENTIONS:  -  Assess patient's ability to carry out ADLs; assess patient's baseline for ADL function and identify physical deficits which impact ability to perform ADLs (bathing, care of mouth/teeth, toileting, grooming, dressing, etc )  - Assess/evaluate cause of self-care deficits   - Assess range of motion  - Assess patient's mobility; develop plan if impaired  - Assess patient's need for assistive devices and provide as appropriate  - Encourage maximum independence but intervene and supervise when necessary  - Involve family in performance of ADLs  - Assess for home care needs following discharge   - Consider OT consult to assist with ADL evaluation and planning for discharge  - Provide patient education as appropriate  Outcome: Progressing  Goal: Maintains/Returns to pre admission functional level  Description: INTERVENTIONS:  - Perform BMAT or MOVE assessment daily    - Set and communicate daily mobility goal to care team and patient/family/caregiver  - Collaborate with rehabilitation services on mobility goals if consulted  - Perform Range of Motion 3 times a day  - Reposition patient every 2 hours    - Dangle patient 3 times a day  - Stand patient 3 times a day  - Ambulate patient 3 times a day  - Out of bed to chair 3 times a day   - Out of bed for meals 3 times a day  - Out of bed for toileting  - Record patient progress and toleration of activity level   Outcome: Progressing     Problem: DISCHARGE PLANNING  Goal: Discharge to home or other facility with appropriate resources  Description: INTERVENTIONS:  - Identify barriers to discharge w/patient and caregiver  - Arrange for needed discharge resources and transportation as appropriate  - Identify discharge learning needs (meds, wound care, etc )  - Arrange for interpretive services to assist at discharge as needed  - Refer to Case Management Department for coordinating discharge planning if the patient needs post-hospital services based on physician/advanced practitioner order or complex needs related to functional status, cognitive ability, or social support system  Outcome: Progressing     Problem: Knowledge Deficit  Goal: Patient/family/caregiver demonstrates understanding of disease process, treatment plan, medications, and discharge instructions  Description: Complete learning assessment and assess knowledge base    Interventions:  - Provide teaching at level of understanding  - Provide teaching via preferred learning methods  Outcome: Progressing

## 2023-06-19 NOTE — DISCHARGE SUMMARY
[facility]    Patient Information: Aftab Santos 46 y o  female MRN: [id number]  Unit/Bed#: [address] [place] [address] Encounter: [id number]    Discharging Physician / Practitioner: Dariusz Douglas MD  PCP: Maryjane Hadley MD  Admission Date: 6/17/2023  Discharge Date: 06/19/23    Reason for Admission: Leg Pain (Reported of elective hysterectomy on 6/6  Co of L calf pain for few days when walking, and co of feeling can't take deep breath, denies CP  Someday smoker  )      Discharge Diagnoses:     Principal Problem:    Pulmonary embolism (HCC)  Active Problems:    DVT (deep venous thrombosis) ([facility])    S/P hysterectomy    Anemia    Gastroesophageal reflux disease without esophagitis    Anxiety    History of smoking  Resolved Problems:    * No resolved hospital problems  *        * Pulmonary embolism ([address] )  Assessment & Plan  Reported chest tightness, shortness of breath after hysterectomy on 6/6  CTA revealed moderate-sized pulmonary embolus crossing bifurcation of the lateral basal segment of the right lower lobe  Evidence of mild bronchitis with mild mucosal occlusion and some small subsegmental bronchi in left lung base  Provoked secondary to postoperative state,? Hormonal IUD and smoking contributed  Clinically improving, vital stable  Saturating adequately on room air  Tolerating IV heparin without any evidence of bleeding  · EKG without any acute abnormality  · proBNP, serial troponin negative  · 2D echo with normal EF normal RV function  · Tolerated anticoagulation without any adverse effect  · Discussed anticoagulation options, patient prefers NOACs as  was recently on Eliquis and tolerated well    · Transition to Eliquis on discharge  · Mucinex, bronchodilators as needed  · Smoking cessation  · Pulmonary referral on discharge      Gastroesophageal reflux disease without esophagitis  Assessment & Plan  Patient reported intermittent reflux symptoms, with some worsening postoperatively  Also reports frequent NSAID use postoperatively  Also reports having pain and reflux after swallowing  Denied any history of bleeding or recent endoscopic evaluation  · PPI daily  · Advised to avoid NSAIDs  · non ulcerogenic diet advised  · Monitor for bleeding  · GI referral on discharge for EGD  · Monitor symptoms      Anemia  Assessment & Plan  Normocytic  Likely secondary to postoperative state and history of uterine bleeding  Patient is up-to-date with colonoscopy without any significant abnormality  Remained stable on anticoagulation  · Follow-up iron study evidence of iron deficiency, received Venofer 200 mg x 2 doses  · Normal, B12, folate  · Follow-up with PCP regarding monitoring  · GI referral on discharge      S/P hysterectomy  Assessment & Plan  On 6/6 for abnormal uterine bleeding, denies any history of malignancy  Denies any bleeding per surgery  Monitor      DVT (deep venous thrombosis) (HCC)  Assessment & Plan  Presented with left calf pain for 3 days  Venous Doppler left lower extremity revealed focal acute nonocclusive DVT noted in one of the paired posterior tibial vein in the calf  · Anticoagulation as above  · Leg elevation, compression stockings  · Monitor symptoms    Anxiety  Assessment & Plan  On clonazepam as needed  Verified from [address]    History of smoking  Assessment & Plan  Recently quit, encourage cessation  Continue nicotine patch      Consultations During Hospital Stay:  None    Procedures Performed:     · Echocardiogram-reviewed normal LVEF, normal diastolic function and wall motion  No evidence of RV dilatation or decreased RV contractility      Significant Findings:     · Refer to hospital course and above listed diagnosis related plan for details    Imaging while in hospital:    VAS lower limb venous duplex study, unilateral/limited    Result Date: 6/17/2023  Narrative:  [facility] REPORT CLINICAL: Indications: Patient presents with pain and swelling of their left leg and SOB s/p recent surgery on 06/06/23  Operative History: No cardiovascular surgeries noted Risk Factors The patient has history of smoking (current) 0 25 ppd  FINDINGS:  Left        Impression              Reflux  Valve Closure Time  PostTibial  Non Occlusive Thrombus  Reflux                1 13     CONCLUSION: Impression:  RIGHT LOWER LIMB LIMITED: Evaluation shows no evidence of thrombus in the common femoral vein  Doppler evaluation shows a normal response to augmentation maneuvers  LEFT LOWER LIMB: Evidence of focal acute non-occlusive deep vein thrombosis noted in one of the paired posterior tibial veins in the calf  No evidence of superficial thrombophlebitis noted  Doppler evaluation shows evidence of valvular incompetence in one of the paired posterior tibial veins in the calf  Popliteal, posterior tibial and anterior tibial arterial Doppler waveform's are triphasic  Technical findings were given to Dr Mayco Zelaya via tiger text  SIGNATURE: Electronically Signed by: Jannette Avendano MD on 2023-06-17 01:23:11 PM    CTA ED chest PE Study    Result Date: 6/17/2023  Narrative: CTA - CHEST WITH IV CONTRAST - PULMONARY ANGIOGRAM INDICATION:   Pulmonary embolism (PE) suspected, high prob ro PE high wells  History on 6/6/2023; left calf pain; can't take deep breath COMPARISON: 7/13/2020 TECHNIQUE: CTA examination of the chest was performed using angiographic technique according to a protocol specifically tailored to evaluate for pulmonary embolism  Multiplanar 2D reformatted images were created from the source data  In addition, coronal 3D MIP postprocessing was performed on the acquisition scanner  Radiation dose length product (DLP) for this visit:  465 mGy-cm   This examination, like all CT scans performed in the [facility], was performed utilizing techniques to minimize radiation dose exposure, including the use of iterative reconstruction and automated exposure control   IV Contrast:  85 mL of iohexol (OMNIPAQUE) FINDINGS: PULMONARY ARTERIAL TREE: Right lower lobe pulmonary emboli are noted in the basilar segment 1 crossing a bifurcation on image 122 through 129  The RV LV ratio is not elevated measuring approximately 0 8  Trisha Deleon LUNGS:  Lungs are clear  There is some minimal bronchial occlusion in the left lower lobe basilar bronchi suggesting some secretions without surrounding parenchymal abnormality and subsegmental bronchi which are new with slight thickening suggesting bronchitis with some secretions  PLEURA:  Unremarkable  HEART/GREAT VESSELS:  Unremarkable for patient's age  No thoracic aortic aneurysm  MEDIASTINUM AND PABLO:  Unremarkable  CHEST WALL AND LOWER NECK:   Unremarkable  VISUALIZED STRUCTURES IN THE UPPER ABDOMEN: The patient is status post cholecystectomy  OSSEOUS STRUCTURES:  No acute fracture or destructive osseous lesion  Impression: Evidence of moderate size pulmonary embolus crossing the bifurcation in the lateral basal segment of the right lower lobe  Evidence of mild bronchitis with mild mucous occlusion of a some small subsegmental bronchi at the left lung base  No evidence of pneumonia  This critical finding was discussed with Dr Bozena Self at 9:00 a m  Workstation performed: [id number]       Incidental Findings:   · *CT scan finding as above    Test Results Pending at Discharge (will require follow up):   · As per After Visit Summary     Outpatient Tests Requested:  · Follow-up with PCP for monitoring of hemoglobin  · Follow-up with primary GI for evaluation for reflux and iron deficiency anemia    Complications:  Refer to hospital course and above listed diagnosis related plan, if any    Hospital Course: As per HPI  Estevan Nageotte is a 46 y o  female patient with history of anxiety, recent hysterectomy on 6/6, ex-smoker who originally presented to the hospital on 6/17/2023 due to left calf pain and shortness of breath    Patient reported history of uterine bleeding and had IUD without improvement over the last year and subsequently underwent hysterectomy on 6/6  Patient also reported she had biopsy without any evidence of malignancy  Patient reported after surgery she felt that she was feeling short of breath and some chest tightness  Patient was initially thought that symptoms were related to just recovering from the surgery but her symptoms persisted and patient noted worsening with activity  Last Thursday she noticed having pain behind the left calf worsening with activity and she presented to ED for evaluation      In ED patient was noted to be afebrile with stable vital signs saturating adequately on room air  Work-up revealed mild anemia  CTA chest was done which revealed evidence of moderate-sized pulmonary embolus crossing the bifurcation of the lateral basal segment of right lower lobe  Evidence of mild bronchitis with mild mucous occlusion of some small subsegmental bronchi at left lung base  No evidence of pneumonia noted  Venous Doppler left lower extremity revealed focal acute nonocclusive DVT noted in one of the paired posterior tibial veins in the calf  Patient was started on IV heparin and was subsequently admitted  Patient denies any prior history of thromboembolism  She is unaware about her family history as she is adopted  Patient denied any recent traveling  Patient reports history of smoking she recently quit denies any use of hormonal medications but she had IUD over the last 1 year  Patient was started on anticoagulation, hemodynamically remained stable and saturation remained adequate on room air  Cardiac markers were negative, proBNP was not elevated    Patient reported having chest pain/discomfort/tightness during hospitalization also reported component of worsening with swallowing, patient reported history of reflux symptoms and recent worsening which thought to be secondary to recent increased NSAID use in "postoperative state  Patient was started on PPI and symptomatic treatment with improvement hemoglobin remained stable without any evidence of overt bleeding  Patient was noted to have iron deficiency received IV Venofer  Patient was educated to avoid NSAID use  Chest pain also had reproducible component was advised symptomatic treatment  Repeated EKG and cardiac markers are negative  Patient ambulating in room and hallway with stable vital signs without any tachycardia and desaturation  Echocardiogram was done which revealed normal EF preserved  Without any evidence of evidence of RV compromise  Patient remained clinically stable and was discharged home on Eliquis and was referred to pulmonary after discharge  Patient will also follow-up with GI for iron deficiency anemia and reflux symptoms      Please see above list of diagnoses and related plan for additional information  Condition at Discharge: stable     Discharge Day Visit / Exam:     Subjective:    Reports precordial chest discomfort worsening with movement, pressure and EKG improved  No worsening of eating today  Tolerating diet  Ambulating in room and hallway without any shortness of breath worsening of hypoxia or evidence of tachycardia  Mild dizziness on standing up, orthostatic negative    Vitals: Blood Pressure: 91/50 (06/18/23 2305)  Pulse: 83 (06/18/23 2305)  Temperature: 98 2 °F (36 8 °C) (06/18/23 2305)  Temp Source: Oral (06/18/23 1509)  Respirations: 18 (06/18/23 2305)  Height: 5' 6\" (167 6 cm) (06/17/23 0554)  Weight - Scale: 70 3 kg (155 lb) (06/17/23 0554)  SpO2: 93 % (06/18/23 2305)  Exam:   Physical Exam  Constitutional:       General: She is not in acute distress  HENT:      Head: Normocephalic and atraumatic  Cardiovascular:      Rate and Rhythm: Normal rate  Pulmonary:      Effort: Pulmonary effort is normal  No respiratory distress  Breath sounds: Normal breath sounds  No wheezing or rales        Comments: " "Presternal chest wall tenderness  Abdominal:      General: Bowel sounds are normal  There is no distension  Palpations: Abdomen is soft  Tenderness: There is no abdominal tenderness  There is no guarding or rebound  Musculoskeletal:      Cervical back: Neck supple  Right lower leg: No edema  Left lower leg: No edema  Skin:     General: Skin is warm and dry  Findings: No rash  Neurological:      General: No focal deficit present  Mental Status: She is alert and oriented to person, place, and time  Mental status is at baseline  Psychiatric:      Comments: Anxious at times         Discharge instructions/Information to patient and family:(Discharge Medications and Follow up):   See after visit summary for information provided to patient and family  Provisions for Follow-Up Care:  See after visit summary for information related to follow-up care and any pertinent home health orders  Disposition: Home    Planned Readmission:  No     Discharge Statement:  I spent 50 minutes discharging the patient  This time was spent on the day of discharge  I had direct contact with the patient on the day of discharge  Greater than 50% of the total time was spent examining patient, answering all patient questions, arranging and discussing plan of care with patient as well as directly providing post-discharge instructions  Additional time then spent on discharge activities  Discussed with family at bedside  Discharge Medications:  See after visit summary for reconciled discharge medications provided to patient and family  ** Please Note: \"This note has been constructed using a voice recognition system  Therefore there may be syntax, spelling, and/or grammatical errors  Please call if you have any questions   \"**      "

## 2023-06-19 NOTE — PROGRESS NOTES
06/19/23 1322 06/19/23 1324 06/19/23 1328   Vital Signs   Pulse 69 75 86   Respirations 17 16 18   Blood Pressure 99/61 112/73 106/78   MAP (mmHg) 74 86 87     Orthostatic BPs

## 2023-06-19 NOTE — UTILIZATION REVIEW
Initial Clinical Review    Admission: Date/Time/Statement:   Admission Orders (From admission, onward)     Ordered        06/17/23 0913  INPATIENT ADMISSION  Once                      Orders Placed This Encounter   Procedures   • INPATIENT ADMISSION     Standing Status:   Standing     Number of Occurrences:   1     Order Specific Question:   Level of Care     Answer:   Med Surg [16]     Order Specific Question:   Estimated length of stay     Answer:   More than 2 Midnights     Order Specific Question:   Certification     Answer:   I certify that inpatient services are medically necessary for this patient for a duration of greater than two midnights  See H&P and MD Progress Notes for additional information about the patient's course of treatment  ED Arrival Information     Expected   -    Arrival   6/17/2023 05:43    Acuity   Urgent            Means of arrival   Walk-In    Escorted by   Family Member    Service   Hospitalist    Admission type   Emergency            Arrival complaint   calf pain           Chief Complaint   Patient presents with   • Leg Pain     Reported of elective hysterectomy on 6/6  Co of L calf pain for few days when walking, and co of feeling can't take deep breath, denies CP  Someday smoker  Initial Presentation: 46 y o  female to ED presents for left calf pain and shortness of breath  Last Thursday she noticed having pain behind the left calf worsening with activity rsaday  She noticed having pain behind the left calf worsening with activity  In ED, CT chest revealed evidence of moderate-sized pulmonary embolus crossing the bifurcation of the lateral basal segment of right lower lobe  Evidence of mild bronchitis with mild mucous occlusion of some small subsegmental bronchi at left lung base  Venous Doppler left lower extremity revealed focal acute nonocclusive DVT noted in one of the paired posterior tibial veins in the calf  Started Iv Heparin    PMH for anxiety, recent hysterectomy on 6/6, ex-smoker, uterine bleeding and had IUD without improvement over the last year and subsequently underwent hysterectomy on 6/6  Reports surgery she felt that she was feeling short of breath and some chest tightness  Admit Inpatient level of care for PE  DVT and Anemia  Continue Iv Heparin drip  2D Echo  PPI  Iv Venofer  Iron study, B12 and folate  Monitor H&H  Date: 6/18   Day 2:   Progress notes;  Continue Iv Heparin drip  Mucinex, bronchodilators as needed  Follow-up iron study, B12, folate  Monitor H&H  Leg elevation, compression stockings       ED Triage Vitals [06/17/23 0554]   Temperature Pulse Respirations Blood Pressure SpO2   97 8 °F (36 6 °C) 56 18 159/90 98 %      Temp Source Heart Rate Source Patient Position - Orthostatic VS BP Location FiO2 (%)   Oral Monitor Sitting Right arm --      Pain Score       5          Wt Readings from Last 1 Encounters:   06/19/23 70 3 kg (155 lb)     Additional Vital Signs:   06/18/23 15:09:17 98 4 °F (36 9 °C) -- 14 95/61 72 -- -- --   06/18/23 11:18:26 -- 81 -- 110/79 89 98 % None (Room air) Standing for 3 minutes - Orthostatic VS   06/18/23 11:16:09 -- 77 -- 111/79 90 97 % -- Standing - Orthostatic VS   06/18/23 11:15:12 -- 55 -- 111/77 88 97 % -- Sitting - Orthostatic VS   06/18/23 11:14:11 -- 57 -- 119/74 89 95 % -- Lying - Orthostatic VS   06/18/23 07:44:59 97 9 °F (36 6 °C) 61 16 94/52 66 98 % None (Room air) Lying     06/17/23 19:54:22 97 8 °F (36 6 °C) 73 18 120/79 93 97 % None (Room air) --   06/17/23 14:59:37 98 1 °F (36 7 °C) 62 16 134/85 101 98 % --      Pertinent Labs/Diagnostic Test Results:   VAS lower limb venous duplex study, unilateral/limited (6/17) -    RIGHT LOWER LIMB LIMITED:  Evaluation shows no evidence of thrombus in the common femoral vein  Doppler evaluation shows a normal response to augmentation maneuvers       LEFT LOWER LIMB:  Evidence of focal acute non-occlusive deep vein thrombosis noted in one of the  paired posterior tibial veins in the calf  No evidence of superficial thrombophlebitis noted  Doppler evaluation shows evidence of valvular incompetence in one of the paired  posterior tibial veins in the calf  Popliteal, posterior tibial and anterior tibial arterial Doppler waveform's are  triphasic  Final Result by Kisha Sanchez MD (06/17 2577)      CTA ED chest PE Study   Final Result by Esha Mata MD (06/17 8350)      Evidence of moderate size pulmonary embolus crossing the bifurcation in the lateral basal segment of the right lower lobe  Evidence of mild bronchitis with mild mucous occlusion of a some small subsegmental bronchi at the left lung base  No evidence of pneumonia  This critical finding was discussed with Dr Gurdeep Vizcaino at 9:00 a m  Workstation performed: [id number]           6/19  Echo -   •  Left Ventricle: Left ventricular cavity size is normal  Wall thickness is normal  The left ventricular ejection fraction is 65% by visual estimation  Systolic function is normal  Wall motion is normal  Diastolic function is normal for age  •  Left Atrium: The atrium is upper normal in size  •  Mitral Valve: There is mild regurgitation  •  Tricuspid Valve: There is trace to mild regurgitation  Pulmonary artery pressure around 30 mmHg  •  IVC/SVC: The inferior vena cava is upper normal in size   Respirophasic changes were normal   •  There is no evidence of significant RV dilatation or decreasing RV contractility         Results from last 7 days   Lab Units 06/18/23  0547 06/17/23  0617   WBC Thousand/uL 7 08 6 73   HEMOGLOBIN g/dL 10 5* 10 9*   HEMATOCRIT % 33 0* 33 6*   PLATELETS Thousands/uL 341 379   NEUTROS ABS Thousands/µL  --  3 45         Results from last 7 days   Lab Units 06/18/23  0547 06/17/23  0617   SODIUM mmol/L 137 138   POTASSIUM mmol/L 3 9 3 7   CHLORIDE mmol/L 106 106   CO2 mmol/L 25 25   ANION GAP mmol/L 6 7   BUN mg/dL 12 16   CREATININE mg/dL 0 75 0 82   EGFR ml/min/1 73sq m 91 82   CALCIUM mg/dL 8 0* 9 0             Results from last 7 days   Lab Units 06/18/23  0547 06/17/23  0617   GLUCOSE RANDOM mg/dL 108 103       Results from last 7 days   Lab Units 06/17/23  1930 06/17/23  1727 06/17/23  1513   HS TNI 0HR ng/L  --   --  3   HS TNI 2HR ng/L  --  4  --    HSTNI D2 ng/L  --  1  --    HS TNI 4HR ng/L 3  --   --    HSTNI D4 ng/L 0  --   --          Results from last 7 days   Lab Units 06/18/23  0547 06/17/23  2103 06/17/23  1513 06/17/23  0934   PROTIME seconds  --   --   --  12 9   INR   --   --   --  0 96   PTT seconds 71* 62* 78* 25         Results from last 7 days   Lab Units 06/17/23  0617   PROCALCITONIN ng/ml <0 05                 Results from last 7 days   Lab Units 06/17/23  0617   BNP pg/mL 56     Results from last 7 days   Lab Units 06/18/23  0547   FERRITIN ng/mL 17   IRON SATURATION % 8*   IRON ug/dL 26*   TIBC ug/dL 343       ED Treatment:   Medication Administration from 06/17/2023 0543 to 06/17/2023 1006       Date/Time Order Dose Route Action     06/17/2023 0740 EDT iohexol (OMNIPAQUE) 350 MG/ML injection (SINGLE-DOSE) 100 mL 85 mL Intravenous Given     06/17/2023 0954 EDT heparin (porcine) injection 5,600 Units 5,600 Units Intravenous Given     06/17/2023 0956 EDT heparin (porcine) 25,000 units in 0 45% NaCl 250 mL infusion (premix) 18 Units/kg/hr Intravenous New Bag        Past Medical History:   Diagnosis Date   • Patient denies medical problems      Present on Admission:  • DVT (deep venous thrombosis) (HCC)  • Pulmonary embolism (HCC)  • Anxiety  • S/P hysterectomy  • Anemia  • Gastroesophageal reflux disease without esophagitis      Admitting Diagnosis: Leg pain [M79 606]  Pulmonary embolism (HCC) [I26 99]  DVT (deep venous thrombosis) (HCC) [I82 409]  Age/Sex: 46 y o  female     Admission Orders:  Scheduled Medications:  enoxaparin, 1 mg/kg, Subcutaneous, Q12H [address]  guaiFENesin, 600 mg, Oral, Q12H [address]  iron sucrose, 200 mg, Intravenous, Daily  nicotine, 1 patch, Transdermal, Daily  pantoprazole, 40 mg, Oral, BID AC      Continuous IV Infusions:    heparin (porcine) 25,000 units in 0 45% NaCl 250 mL infusion (premix)  Rate: 2 1-21 mL/hr Dose: 3-30 Units/kg/hr  Weight Dosing Info: 70 kg (Order-Specific)  Freq: Titrated Route: IV  Last Dose: Stopped (06/18/23 1104)  Start: 06/17/23 0915 End: 06/18/23 1057      PRN Meds:  acetaminophen, 650 mg, Oral, Q6H PRN 6/17 x1  albuterol, 2 puff, Inhalation, Q4H PRN  calcium carbonate, 500 mg, Oral, Daily PRN  clonazePAM, 0 5 mg, Oral, BID PRN  dextromethorphan-guaiFENesin, 10 mL, Oral, Q4H PRN        None    Network Utilization Review Department  ATTENTION: Please call with any questions or concerns to [phone] and carefully listen to the prompts so that you are directed to the right person  All voicemails are confidential   Lisha Grate all requests for admission clinical reviews, approved or denied determinations and any other requests to dedicated fax number below belonging to the campus where the patient is receiving treatment   List of dedicated fax numbers for the Facilities:  [address] DENIALS (Administrative/Medical Necessity) [phone]   [address] (Maternity/NICU/Pediatrics) [phone]   [address] [phone]   [facility] 77 [phone]   [address] [place] [address] [facility] [phone]   [address] [facility] 134 815 [facility] [phone]

## 2023-06-19 NOTE — PLAN OF CARE
Problem: PAIN - ADULT  Goal: Verbalizes/displays adequate comfort level or baseline comfort level  Description: Interventions:  - Encourage patient to monitor pain and request assistance  - Assess pain using appropriate pain scale  - Administer analgesics based on type and severity of pain and evaluate response  - Implement non-pharmacological measures as appropriate and evaluate response  - Consider cultural and social influences on pain and pain management  - Notify physician/advanced practitioner if interventions unsuccessful or patient reports new pain  Outcome: Progressing     Problem: INFECTION - ADULT  Goal: Absence or prevention of progression during hospitalization  Description: INTERVENTIONS:  - Assess and monitor for signs and symptoms of infection  - Monitor lab/diagnostic results  - Monitor all insertion sites, i e  indwelling lines, tubes, and drains  - Monitor endotracheal if appropriate and nasal secretions for changes in amount and color  - [place] appropriate cooling/warming therapies per order  - Administer medications as ordered  - Instruct and encourage patient and family to use good hand hygiene technique  - Identify and instruct in appropriate isolation precautions for identified infection/condition  Outcome: Progressing     Problem: SAFETY ADULT  Goal: Patient will remain free of falls  Description: INTERVENTIONS:  - Educate patient/family on patient safety including physical limitations  - Instruct patient to call for assistance with activity   - Consult OT/PT to assist with strengthening/mobility   - Keep Call bell within reach  - Keep bed low and locked with side rails adjusted as appropriate  - Keep care items and personal belongings within reach  - Initiate and maintain comfort rounds  - Make Fall Risk Sign visible to staff  - Offer Toileting every 2 Hours, in advance of need  - Initiate/Maintain alarm  - Obtain necessary fall risk management equipment  - Apply yellow socks and bracelet for high fall risk patients  - Consider moving patient to room near nurses station  Outcome: Progressing  Goal: Maintain or return to baseline ADL function  Description: INTERVENTIONS:  -  Assess patient's ability to carry out ADLs; assess patient's baseline for ADL function and identify physical deficits which impact ability to perform ADLs (bathing, care of mouth/teeth, toileting, grooming, dressing, etc )  - Assess/evaluate cause of self-care deficits   - Assess range of motion  - Assess patient's mobility; develop plan if impaired  - Assess patient's need for assistive devices and provide as appropriate  - Encourage maximum independence but intervene and supervise when necessary  - Involve family in performance of ADLs  - Assess for home care needs following discharge   - Consider OT consult to assist with ADL evaluation and planning for discharge  - Provide patient education as appropriate  Outcome: Progressing  Goal: Maintains/Returns to pre admission functional level  Description: INTERVENTIONS:  - Perform BMAT or MOVE assessment daily    - Set and communicate daily mobility goal to care team and patient/family/caregiver  - Collaborate with rehabilitation services on mobility goals if consulted  - Perform Range of Motion 3 times a day  - Reposition patient every 2 hours    - Dangle patient 3 times a day  - Stand patient 3 times a day  - Ambulate patient 3 times a day  - Out of bed to chair 3 times a day   - Out of bed for meals 3 times a day  - Out of bed for toileting  - Record patient progress and toleration of activity level   Outcome: Progressing

## 2023-06-20 ENCOUNTER — TELEPHONE (OUTPATIENT)
Dept: PULMONOLOGY | Facility: MEDICAL CENTER | Age: 53
End: 2023-06-20

## 2023-06-20 LAB
ATRIAL RATE: 60 BPM
ATRIAL RATE: 62 BPM
P AXIS: 58 DEGREES
P AXIS: 67 DEGREES
PR INTERVAL: 130 MS
PR INTERVAL: 136 MS
QRS AXIS: 82 DEGREES
QRS AXIS: 85 DEGREES
QRSD INTERVAL: 72 MS
QRSD INTERVAL: 72 MS
QT INTERVAL: 424 MS
QT INTERVAL: 428 MS
QTC INTERVAL: 424 MS
QTC INTERVAL: 434 MS
T WAVE AXIS: 74 DEGREES
T WAVE AXIS: 78 DEGREES
VENTRICULAR RATE: 60 BPM
VENTRICULAR RATE: 62 BPM

## 2023-06-20 PROCEDURE — 93010 ELECTROCARDIOGRAM REPORT: CPT | Performed by: INTERNAL MEDICINE

## 2023-06-20 NOTE — TELEPHONE ENCOUNTER
LM for patient to call office back to schedule NP appointment for Pulmonary Embolism, patient was recently in hospital  Referral request appointment to be scheduled in 1 month

## 2023-06-20 NOTE — UTILIZATION REVIEW
NOTIFICATION OF ADMISSION DISCHARGE   This is a Notification of Discharge from [facility]  Please be advised that this patient has been discharge from our facility  Below you will find the admission and discharge date and time including the patient’s disposition  UTILIZATION REVIEW CONTACT:  [employer]  Utilization   Network Utilization Review Department  Phone: [phone] x carefully listen to the prompts  All voicemails are confidential   Email: [email or web address]     ADMISSION INFORMATION  PRESENTATION DATE: 6/17/2023  5:47 AM  OBERVATION ADMISSION DATE:   INPATIENT ADMISSION DATE: 6/17/23  9:13 AM   DISCHARGE DATE: 6/19/2023  3:13 PM   DISPOSITION:Home/Self Care    IMPORTANT INFORMATION:  Send all requests for admission clinical reviews, approved or denied determinations and any other requests to dedicated fax number below belonging to the campus where the patient is receiving treatment   List of dedicated fax numbers:  [address] DENIALS (Administrative/Medical Necessity) [phone]   [address] (Maternity/NICU/Pediatrics) [phone]   [facility] [phone]   [place] [address] [phone]   [address] [phone]   [facility] [phone]   [facility] [phone]   [address] [place] [address] [phone]   [facility]  [phone]   [facility] [phone]   [facility] [address] [facility] [phone]

## 2023-06-23 LAB
ATRIAL RATE: 0 BPM
ATRIAL RATE: 63 BPM
ATRIAL RATE: 65 BPM
ATRIAL RATE: 65 BPM
ATRIAL RATE: 67 BPM
ATRIAL RATE: 67 BPM
ATRIAL RATE: 69 BPM
P AXIS: 52 DEGREES
P AXIS: 55 DEGREES
P AXIS: 56 DEGREES
P AXIS: 57 DEGREES
PR INTERVAL: 138 MS
PR INTERVAL: 142 MS
PR INTERVAL: 142 MS
PR INTERVAL: 144 MS
PR INTERVAL: 144 MS
PR INTERVAL: 146 MS
QRS AXIS: 0 DEGREES
QRS AXIS: 71 DEGREES
QRS AXIS: 72 DEGREES
QRS AXIS: 72 DEGREES
QRS AXIS: 73 DEGREES
QRSD INTERVAL: 0 MS
QRSD INTERVAL: 76 MS
QRSD INTERVAL: 78 MS
QRSD INTERVAL: 80 MS
QT INTERVAL: 0 MS
QT INTERVAL: 414 MS
QT INTERVAL: 428 MS
QT INTERVAL: 430 MS
QT INTERVAL: 432 MS
QT INTERVAL: 434 MS
QT INTERVAL: 436 MS
QTC INTERVAL: 0 MS
QTC INTERVAL: 430 MS
QTC INTERVAL: 442 MS
QTC INTERVAL: 452 MS
QTC INTERVAL: 453 MS
QTC INTERVAL: 454 MS
QTC INTERVAL: 465 MS
T WAVE AXIS: 0 DEGREES
T WAVE AXIS: 64 DEGREES
T WAVE AXIS: 64 DEGREES
T WAVE AXIS: 66 DEGREES
T WAVE AXIS: 67 DEGREES
T WAVE AXIS: 69 DEGREES
T WAVE AXIS: 72 DEGREES
VENTRICULAR RATE: 0 BPM
VENTRICULAR RATE: 63 BPM
VENTRICULAR RATE: 65 BPM
VENTRICULAR RATE: 65 BPM
VENTRICULAR RATE: 67 BPM
VENTRICULAR RATE: 67 BPM
VENTRICULAR RATE: 69 BPM

## 2023-06-23 PROCEDURE — 93010 ELECTROCARDIOGRAM REPORT: CPT | Performed by: INTERNAL MEDICINE

## 2023-08-04 ENCOUNTER — APPOINTMENT (EMERGENCY)
Dept: RADIOLOGY | Facility: HOSPITAL | Age: 53
End: 2023-08-04
Payer: COMMERCIAL

## 2023-08-04 ENCOUNTER — HOSPITAL ENCOUNTER (EMERGENCY)
Facility: HOSPITAL | Age: 53
Discharge: HOME | End: 2023-08-04
Attending: EMERGENCY MEDICINE
Payer: COMMERCIAL

## 2023-08-04 VITALS
DIASTOLIC BLOOD PRESSURE: 76 MMHG | SYSTOLIC BLOOD PRESSURE: 127 MMHG | WEIGHT: 155 LBS | HEART RATE: 65 BPM | RESPIRATION RATE: 18 BRPM | HEIGHT: 66 IN | TEMPERATURE: 98.3 F | BODY MASS INDEX: 24.91 KG/M2 | OXYGEN SATURATION: 96 %

## 2023-08-04 DIAGNOSIS — M79.605 LEFT LEG PAIN: Primary | ICD-10-CM

## 2023-08-04 LAB
ALBUMIN SERPL BCP-MCNC: 4.4 G/DL (ref 3.5–5)
ALP SERPL-CCNC: 70 U/L (ref 34–104)
ALT SERPL W P-5'-P-CCNC: 22 U/L (ref 7–52)
ANION GAP SERPL CALCULATED.3IONS-SCNC: 10 MMOL/L
APTT PPP: 28 SECONDS (ref 23–37)
AST SERPL W P-5'-P-CCNC: 19 U/L (ref 13–39)
BASOPHILS # BLD AUTO: 0.05 THOUSANDS/ÂΜL (ref 0–0.1)
BASOPHILS NFR BLD AUTO: 1 % (ref 0–1)
BILIRUB SERPL-MCNC: 0.42 MG/DL (ref 0.2–1)
BUN SERPL-MCNC: 17 MG/DL (ref 5–25)
CALCIUM SERPL-MCNC: 9.5 MG/DL (ref 8.4–10.2)
CHLORIDE SERPL-SCNC: 103 MMOL/L (ref 96–108)
CO2 SERPL-SCNC: 25 MMOL/L (ref 21–32)
CREAT SERPL-MCNC: 0.73 MG/DL (ref 0.6–1.3)
EOSINOPHIL # BLD AUTO: 0.16 THOUSAND/ÂΜL (ref 0–0.61)
EOSINOPHIL NFR BLD AUTO: 4 % (ref 0–6)
ERYTHROCYTE [DISTWIDTH] IN BLOOD BY AUTOMATED COUNT: 15.4 % (ref 11.6–15.1)
GFR SERPL CREATININE-BSD FRML MDRD: 94 ML/MIN/1.73SQ M
GLUCOSE SERPL-MCNC: 114 MG/DL (ref 65–140)
HCT VFR BLD AUTO: 39.5 % (ref 34.8–46.1)
HGB BLD-MCNC: 12.8 G/DL (ref 11.5–15.4)
IMM GRANULOCYTES # BLD AUTO: 0.01 THOUSAND/UL (ref 0–0.2)
IMM GRANULOCYTES NFR BLD AUTO: 0 % (ref 0–2)
INR PPP: 1.15 (ref 0.84–1.19)
LYMPHOCYTES # BLD AUTO: 1.38 THOUSANDS/ÂΜL (ref 0.6–4.47)
LYMPHOCYTES NFR BLD AUTO: 35 % (ref 14–44)
MCH RBC QN AUTO: 27.7 PG (ref 26.8–34.3)
MCHC RBC AUTO-ENTMCNC: 32.4 G/DL (ref 31.4–37.4)
MCV RBC AUTO: 86 FL (ref 82–98)
MONOCYTES # BLD AUTO: 0.22 THOUSAND/ÂΜL (ref 0.17–1.22)
MONOCYTES NFR BLD AUTO: 6 % (ref 4–12)
NEUTROPHILS # BLD AUTO: 2.16 THOUSANDS/ÂΜL (ref 1.85–7.62)
NEUTS SEG NFR BLD AUTO: 54 % (ref 43–75)
NRBC BLD AUTO-RTO: 0 /100 WBCS
PLATELET # BLD AUTO: 256 THOUSANDS/UL (ref 149–390)
PMV BLD AUTO: 9.8 FL (ref 8.9–12.7)
POTASSIUM SERPL-SCNC: 4 MMOL/L (ref 3.5–5.3)
PROT SERPL-MCNC: 7.1 G/DL (ref 6.4–8.4)
PROTHROMBIN TIME: 14.8 SECONDS (ref 11.6–14.5)
RBC # BLD AUTO: 4.62 MILLION/UL (ref 3.81–5.12)
SODIUM SERPL-SCNC: 138 MMOL/L (ref 135–147)
WBC # BLD AUTO: 3.98 THOUSAND/UL (ref 4.31–10.16)

## 2023-08-04 PROCEDURE — 85610 PROTHROMBIN TIME: CPT | Performed by: PHYSICIAN ASSISTANT

## 2023-08-04 PROCEDURE — 93971 EXTREMITY STUDY: CPT | Performed by: SURGERY

## 2023-08-04 PROCEDURE — 93971 EXTREMITY STUDY: CPT

## 2023-08-04 PROCEDURE — 85025 COMPLETE CBC W/AUTO DIFF WBC: CPT | Performed by: PHYSICIAN ASSISTANT

## 2023-08-04 PROCEDURE — 85730 THROMBOPLASTIN TIME PARTIAL: CPT | Performed by: PHYSICIAN ASSISTANT

## 2023-08-04 PROCEDURE — 36415 COLL VENOUS BLD VENIPUNCTURE: CPT | Performed by: PHYSICIAN ASSISTANT

## 2023-08-04 PROCEDURE — 99284 EMERGENCY DEPT VISIT MOD MDM: CPT | Performed by: PHYSICIAN ASSISTANT

## 2023-08-04 PROCEDURE — 99284 EMERGENCY DEPT VISIT MOD MDM: CPT

## 2023-08-04 PROCEDURE — 80053 COMPREHEN METABOLIC PANEL: CPT | Performed by: PHYSICIAN ASSISTANT

## 2023-08-04 NOTE — ED PROVIDER NOTES
History  Chief Complaint   Patient presents with   • Leg Pain     Left calf pain and swelling since yesterday. Had hyster in June and developed left calf DVT that caused PE, pt is on eliquis      Patient is a 80-year-old female with past medical history significant for current everyday smoking, who was recently admitted to the hospital for left lower extremity DVT and PE who presents for evaluation of worsening pain in her left calf. Patient was discharged on Eliquis and has been taking her Eliquis as prescribed. Patient states the pain started yesterday. The pain is moderate, but worse than it was. She describes the pain as throbbing, cramping. The pain does not radiate. The pain is constant and worsening. She states that it feels very similar to her previous DVT. She has not noted any relapsing or remitting factors. Patient specifically denies fever, chills, nausea, vomiting, numbness, swelling, decreased range of motion, worsening shortness of breath or back pain      Leg Pain  Associated symptoms: no back pain and no fever        Prior to Admission Medications   Prescriptions Last Dose Informant Patient Reported? Taking?   acetaminophen (TYLENOL) 325 mg tablet   No No   Sig: Take 2 tablets (650 mg total) by mouth every 6 (six) hours as needed for mild pain, headaches or fever   albuterol (ProAir HFA) 90 mcg/act inhaler   No No   Sig: Inhale 2 puffs every 6 (six) hours as needed for wheezing   apixaban (Eliquis) 5 mg   No No   Sig: Take 2 tablets (10 mg total) by mouth 2 (two) times a day for 7 days, THEN 1 tablet (5 mg total) 2 (two) times a day for 23 days. clonazePAM (KlonoPIN) 0.5 mg tablet   Yes No   Sig: Take 1 tablet by mouth 2 (two) times a day as needed   nicotine (NICODERM CQ) 7 mg/24hr TD 24 hr patch   No No   Sig: Place 1 patch on the skin over 24 hours daily Do not start before June 20, 2023.    pantoprazole (PROTONIX) 40 mg tablet   No No   Sig: Take 1 tablet (40 mg total) by mouth daily Facility-Administered Medications: None       Past Medical History:   Diagnosis Date   • Patient denies medical problems        Past Surgical History:   Procedure Laterality Date   • CHOLECYSTECTOMY     • TONSILLECTOMY         Family History   Adopted: Yes     I have reviewed and agree with the history as documented. E-Cigarette/Vaping   • E-Cigarette Use Never User      E-Cigarette/Vaping Substances     Social History     Tobacco Use   • Smoking status: Some Days   • Smokeless tobacco: Never   • Tobacco comments:     1 pack a week   Vaping Use   • Vaping Use: Never used   Substance Use Topics   • Alcohol use: Yes     Comment: weekends   • Drug use: Yes     Types: Marijuana     Comment: occasionally       Review of Systems   Constitutional: Negative. Negative for chills and fever. HENT: Negative. Negative for ear pain and sore throat. Eyes: Negative. Negative for pain and visual disturbance. Respiratory: Negative. Negative for cough and shortness of breath. Cardiovascular: Negative. Negative for chest pain and palpitations. Gastrointestinal: Negative. Negative for abdominal pain and vomiting. Endocrine: Negative. Genitourinary: Negative. Negative for dysuria and hematuria. Musculoskeletal: Positive for myalgias. Negative for arthralgias and back pain. Skin: Negative. Negative for color change and rash. Allergic/Immunologic: Negative. Neurological: Negative. Negative for seizures and syncope. Hematological: Negative. Psychiatric/Behavioral: Negative. All other systems reviewed and are negative. Physical Exam  Physical Exam  Vitals and nursing note reviewed. Constitutional:       General: She is not in acute distress. Appearance: Normal appearance. She is well-developed. HENT:      Head: Normocephalic and atraumatic.       Nose: Nose normal.      Mouth/Throat:      Mouth: Mucous membranes are moist.   Eyes:      Conjunctiva/sclera: Conjunctivae normal. Pupils: Pupils are equal, round, and reactive to light. Cardiovascular:      Rate and Rhythm: Normal rate and regular rhythm. Pulses: Normal pulses. Heart sounds: Normal heart sounds. No murmur heard. Pulmonary:      Effort: Pulmonary effort is normal. No respiratory distress. Breath sounds: Normal breath sounds. Abdominal:      General: Abdomen is flat. Bowel sounds are normal.      Palpations: Abdomen is soft. Tenderness: There is no abdominal tenderness. Musculoskeletal:         General: Tenderness present. No swelling. Normal range of motion. Cervical back: Normal range of motion and neck supple. Right lower leg: No edema. Left lower leg: No edema. Legs:    Skin:     General: Skin is warm and dry. Capillary Refill: Capillary refill takes less than 2 seconds. Neurological:      Mental Status: She is alert.    Psychiatric:         Mood and Affect: Mood normal.         Vital Signs  ED Triage Vitals [08/04/23 0858]   Temperature Pulse Respirations Blood Pressure SpO2   98.3 °F (36.8 °C) 65 18 127/76 96 %      Temp src Heart Rate Source Patient Position - Orthostatic VS BP Location FiO2 (%)   -- -- -- -- --      Pain Score       7           Vitals:    08/04/23 0858   BP: 127/76   Pulse: 65         Visual Acuity      ED Medications  Medications - No data to display    Diagnostic Studies  Results Reviewed     Procedure Component Value Units Date/Time    Protime-INR [[id number]]  (Abnormal) Collected: 08/04/23 0922    Lab Status: Final result Specimen: Blood from Arm, Right Updated: 08/04/23 1003     Protime 14.8 seconds      INR 1.15    APTT [[id number]]  (Normal) Collected: 08/04/23 0922    Lab Status: Final result Specimen: Blood from Arm, Right Updated: 08/04/23 1003     PTT 28 seconds     Comprehensive metabolic panel [[id number]] Collected: 08/04/23 0922    Lab Status: Final result Specimen: Blood from Arm, Right Updated: 08/04/23 0954     Sodium 138 mmol/L Potassium 4.0 mmol/L      Chloride 103 mmol/L      CO2 25 mmol/L      ANION GAP 10 mmol/L      BUN 17 mg/dL      Creatinine 0.73 mg/dL      Glucose 114 mg/dL      Calcium 9.5 mg/dL      AST 19 U/L      ALT 22 U/L      Alkaline Phosphatase 70 U/L      Total Protein 7.1 g/dL      Albumin 4.4 g/dL      Total Bilirubin 0.42 mg/dL      eGFR 94 ml/min/1.73sq m     Narrative:      National Kidney Disease Foundation guidelines for Chronic Kidney Disease (CKD):   •  Stage 1 with normal or high GFR (GFR > 90 mL/min/1.73 square meters)  •  Stage 2 Mild CKD (GFR = 60-89 mL/min/1.73 square meters)  •  Stage 3A Moderate CKD (GFR = 45-59 mL/min/1.73 square meters)  •  Stage 3B Moderate CKD (GFR = 30-44 mL/min/1.73 square meters)  •  Stage 4 Severe CKD (GFR = 15-29 mL/min/1.73 square meters)  •  Stage 5 End Stage CKD (GFR <15 mL/min/1.73 square meters)  Note: GFR calculation is accurate only with a steady state creatinine    CBC and differential [[id number]]  (Abnormal) Collected: 08/04/23 0922    Lab Status: Final result Specimen: Blood from Arm, Right Updated: 08/04/23 0929     WBC 3.98 Thousand/uL      RBC 4.62 Million/uL      Hemoglobin 12.8 g/dL      Hematocrit 39.5 %      MCV 86 fL      MCH 27.7 pg      MCHC 32.4 g/dL      RDW 15.4 %      MPV 9.8 fL      Platelets 091 Thousands/uL      nRBC 0 /100 WBCs      Neutrophils Relative 54 %      Immat GRANS % 0 %      Lymphocytes Relative 35 %      Monocytes Relative 6 %      Eosinophils Relative 4 %      Basophils Relative 1 %      Neutrophils Absolute 2.16 Thousands/µL      Immature Grans Absolute 0.01 Thousand/uL      Lymphocytes Absolute 1.38 Thousands/µL      Monocytes Absolute 0.22 Thousand/µL      Eosinophils Absolute 0.16 Thousand/µL      Basophils Absolute 0.05 Thousands/µL                  VAS lower limb venous duplex study, unilateral/limited    (Results Pending)              Procedures  Procedures         ED Course  ED Course as of 08/04/23 1925   Fri Aug 04, 2023   3343 WBC(!): 3.98                               SBIRT 22yo+    Flowsheet Row Most Recent Value   Initial Alcohol Screen: US AUDIT-C     1. How often do you have a drink containing alcohol? 0 Filed at: 08/04/2023 0900   2. How many drinks containing alcohol do you have on a typical day you are drinking? 0 Filed at: 08/04/2023 0900   3a. Male UNDER 65: How often do you have five or more drinks on one occasion? 0 Filed at: 08/04/2023 0900   3b. FEMALE Any Age, or MALE 65+: How often do you have 4 or more drinks on one occassion? 0 Filed at: 08/04/2023 0900   Audit-C Score 0 Filed at: 08/04/2023 0900   SPENCER: How many times in the past year have you. .. Used an illegal drug or used a prescription medication for non-medical reasons? Never Filed at: 08/04/2023 0900                    Medical Decision Making  Left leg pain: acute illness or injury     Details: Patient with left calf pain and recent DVT and PE  Ultrasound without evidence of thrombosis-previous DVT has resolved  Patient will continue taking her Eliquis  Patient educated on red flag symptoms that would necessitate return to the ED  Amount and/or Complexity of Data Reviewed  External Data Reviewed: notes. Labs: ordered. Decision-making details documented in ED Course. Radiology: ordered. Decision-making details documented in ED Course. Disposition  Final diagnoses:   Left leg pain     Time reflects when diagnosis was documented in both MDM as applicable and the Disposition within this note     Time User Action Codes Description Comment    8/4/2023 10:38 AM Noah Wilkinson Add [M79.605] Left leg pain       ED Disposition     ED Disposition   Discharge    Condition   Stable    Date/Time   Fri Aug 4, 2023 10:38 AM    Comment   Miguel Terry discharge to home/self care.                Follow-up Information     Follow up With Specialties Details Why Contact Info Additional Dwayne Curtis MD  Call  As needed [address] [place] [address]  [address] [place] [address] Emergency Department Emergency Medicine Go to  If symptoms worsen [address] [address]  [facility] Emergency Department, [address], [place], [address]          Discharge Medication List as of 8/4/2023 10:39 AM      CONTINUE these medications which have NOT CHANGED    Details   acetaminophen (TYLENOL) 325 mg tablet Take 2 tablets (650 mg total) by mouth every 6 (six) hours as needed for mild pain, headaches or fever, Starting Mon 6/19/2023, No Print      albuterol (ProAir HFA) 90 mcg/act inhaler Inhale 2 puffs every 6 (six) hours as needed for wheezing, Starting Mon 6/19/2023, Normal      apixaban (Eliquis) 5 mg Multiple Dosages:Starting Sat 6/17/2023, Until Fri 6/23/2023 at 2359, THEN Starting Sat 6/24/2023, Until Sun 7/16/2023 at 2359Take 2 tablets (10 mg total) by mouth 2 (two) times a day for 7 days, THEN 1 tablet (5 mg total) 2 (two) times a day for 23 d ays., Normal      clonazePAM (KlonoPIN) 0.5 mg tablet Take 1 tablet by mouth 2 (two) times a day as needed, Starting Tue 5/16/2023, Historical Med      nicotine (NICODERM CQ) 7 mg/24hr TD 24 hr patch Place 1 patch on the skin over 24 hours daily Do not start before June 20, 2023., Starting Tue 6/20/2023, Normal      pantoprazole (PROTONIX) 40 mg tablet Take 1 tablet (40 mg total) by mouth daily, Starting Mon 6/19/2023, Normal             No discharge procedures on file.     PDMP Review       Value Time User    PDMP Reviewed  Yes 6/17/2023  2:44 PM Keanu Beauchamp MD          ED Provider  Electronically Signed by           Sharlene Lopez PA-C  08/04/23 6376

## 2023-08-08 ENCOUNTER — CONSULT (OUTPATIENT)
Dept: PULMONOLOGY | Facility: MEDICAL CENTER | Age: 53
End: 2023-08-08
Payer: COMMERCIAL

## 2023-08-08 VITALS
RESPIRATION RATE: 12 BRPM | OXYGEN SATURATION: 98 % | HEART RATE: 88 BPM | WEIGHT: 178 LBS | TEMPERATURE: 97.8 F | SYSTOLIC BLOOD PRESSURE: 110 MMHG | BODY MASS INDEX: 28.61 KG/M2 | DIASTOLIC BLOOD PRESSURE: 78 MMHG | HEIGHT: 66 IN

## 2023-08-08 DIAGNOSIS — I82.409 DVT (DEEP VENOUS THROMBOSIS) (HCC): ICD-10-CM

## 2023-08-08 DIAGNOSIS — J40 BRONCHITIS: ICD-10-CM

## 2023-08-08 DIAGNOSIS — Z87.891 HISTORY OF SMOKING: ICD-10-CM

## 2023-08-08 DIAGNOSIS — I26.99 PULMONARY EMBOLISM (HCC): Primary | ICD-10-CM

## 2023-08-08 PROCEDURE — 99203 OFFICE O/P NEW LOW 30 MIN: CPT | Performed by: HOSPITALIST

## 2023-08-08 RX ORDER — TURMERIC 100 %
POWDER (GRAM) MISCELLANEOUS
COMMUNITY

## 2023-08-08 RX ORDER — OMEPRAZOLE 20 MG/1
TABLET, DELAYED RELEASE ORAL
COMMUNITY

## 2023-08-08 RX ORDER — LECITHIN 1200 MG
CAPSULE ORAL
COMMUNITY

## 2023-08-08 NOTE — PROGRESS NOTES
Pulmonary Consult Note     Assessment:   Acute Pulmonary Embolism - likely provoked 2/2 hysterectomy. No hx of thrombosis in the past. Overall improved, with residual dyspnea. Plan:  - c/w Eliquis 5mg BID. Complete for 3 months. Will refill additional 40 days supply. - avoid contact sports, heavy lifting while on anticoagulation, ok to do light low impact exercises. - follow-up after completion of Eliquis. Referring Provider: Janeal Ahumada, MD      Chief Complaint: Pulmonary Embolism, post-hospitalization    HPI:   47 yo F presenting for follow-up evaluation after hospitalization for acute pulmonary embolism s/p hysterectomy for uterine bleeding / leiomyoma. She has been on Eliquis since hospital discharge (23 to 23). Currently feels well, still has some residual SOB but improved compared to hospitalization. Last week had LLE pain and presented to ED for evaluation -- LE US did not show residual thrombus. Currently back to baseline without leg pain. Social History:   Social History     Tobacco Use   • Smoking status: Former     Packs/day: 0.01     Years: 10.00     Total pack years: 0.10     Types: Cigarettes     Quit date: 2022     Years since quittin.6   • Smokeless tobacco: Never   • Tobacco comments:     1 pack a week   Vaping Use   • Vaping Use: Never used   Substance Use Topics   • Alcohol use: Yes     Comment: weekends   • Drug use: Yes     Types: Marijuana     Comment: occasionally       Pmhx:   Past Medical History:   Diagnosis Date   • Patient denies medical problems         Surgical history:   Past Surgical History:   Procedure Laterality Date   • CHOLECYSTECTOMY     • TONSILLECTOMY          Family History:   Family History   Adopted:  Yes        Allergies: No Known Allergies    Current Meds:   Current Outpatient Medications on File Prior to Visit   Medication Sig Dispense Refill   • acetaminophen (TYLENOL) 325 mg tablet Take 2 tablets (650 mg total) by mouth every 6 (six) hours as needed for mild pain, headaches or fever  0   • Black Cohosh 160 MG CAPS      • clonazePAM (KlonoPIN) 0.5 mg tablet Take 1 tablet by mouth 2 (two) times a day as needed     • Iron-Vitamin C (IRON 100/C PO)      • omeprazole (PriLOSEC OTC) 20 MG tablet      • Turmeric, Curcuma Longa, (Turmeric Root) POWD      • albuterol (ProAir HFA) 90 mcg/act inhaler Inhale 2 puffs every 6 (six) hours as needed for wheezing (Patient not taking: Reported on 8/8/2023) 8.5 g 0   • nicotine (NICODERM CQ) 7 mg/24hr TD 24 hr patch Place 1 patch on the skin over 24 hours daily Do not start before June 20, 2023. 28 patch 0   • pantoprazole (PROTONIX) 40 mg tablet Take 1 tablet (40 mg total) by mouth daily (Patient not taking: Reported on 8/8/2023) 30 tablet 0   • [DISCONTINUED] apixaban (Eliquis) 5 mg Take 2 tablets (10 mg total) by mouth 2 (two) times a day for 7 days, THEN 1 tablet (5 mg total) 2 (two) times a day for 23 days. (Patient taking differently: Taking 5mg twice daily  ) 74 tablet 0     No current facility-administered medications on file prior to visit. Review of Systems: all review of system negative except as stated in HPI     Vital Signs:   /78 (BP Location: Left arm, Patient Position: Sitting, Cuff Size: Standard)   Pulse 88   Temp 97.8 °F (36.6 °C) (Tympanic)   Resp 12   Ht 5' 6" (1.676 m)   Wt 80.7 kg (178 lb)   LMP 02/27/2020   SpO2 98%   BMI 28.73 kg/m²     Physical Exam:   General: NAD, well appearing   Eyes: anicteric    Cardiac: s1s2 rrr, no m/r/g   Lungs: clear, no wheezing or rales    MSK: no peripheral edema, nontender LE       Pertinent labs and imaging reviewed. TTE reviewed - PAP appx 30mmhg, no evidence of RV dysfunction.

## 2023-11-09 ENCOUNTER — HOSPITAL ENCOUNTER (OUTPATIENT)
Dept: RADIOLOGY | Facility: HOSPITAL | Age: 53
Discharge: HOME | End: 2023-11-09
Payer: COMMERCIAL

## 2023-11-09 DIAGNOSIS — Z12.31 ENCOUNTER FOR SCREENING MAMMOGRAM FOR MALIGNANT NEOPLASM OF BREAST: ICD-10-CM

## 2023-11-09 PROCEDURE — 77063 BREAST TOMOSYNTHESIS BI: CPT

## 2023-11-09 PROCEDURE — 77067 SCR MAMMO BI INCL CAD: CPT

## 2023-11-15 ENCOUNTER — OFFICE VISIT (OUTPATIENT)
Dept: PULMONOLOGY | Facility: MEDICAL CENTER | Age: 53
End: 2023-11-15
Payer: COMMERCIAL

## 2023-11-15 VITALS
HEIGHT: 66 IN | OXYGEN SATURATION: 97 % | BODY MASS INDEX: 27.74 KG/M2 | HEART RATE: 76 BPM | DIASTOLIC BLOOD PRESSURE: 82 MMHG | SYSTOLIC BLOOD PRESSURE: 122 MMHG | WEIGHT: 172.6 LBS | RESPIRATION RATE: 12 BRPM

## 2023-11-15 DIAGNOSIS — Z87.891 EX-SMOKER: ICD-10-CM

## 2023-11-15 DIAGNOSIS — I26.93 SINGLE SUBSEGMENTAL PULMONARY EMBOLISM WITHOUT ACUTE COR PULMONALE (HCC): Primary | ICD-10-CM

## 2023-11-15 PROCEDURE — 99214 OFFICE O/P EST MOD 30 MIN: CPT | Performed by: STUDENT IN AN ORGANIZED HEALTH CARE EDUCATION/TRAINING PROGRAM

## 2023-11-15 NOTE — PROGRESS NOTES
Pulmonary Outpatient Progress Note   Naya Edwards 46 y.o. female MRN: [id number]  11/15/2023      No problem-specific Assessment & Plan notes found for this encounter. Assessment:    Acute intermediate low risk pulmonary embolism, provoked by surgery/hysterectomy along with DVT in the left tibial vein. She was placed on 3 months of Eliquis and now has completed this several weeks ago. She had an echocardiogram at the time of diagnosis and did not have RV strain, her CT did not show RV to LV ratio greater than 0.9. Since finishing her Eliquis, she feels fine, she tolerated well with no significant side effects. Going forward, we should obtain an echocardiogram to evaluate for CTEPH or PH related to PE. Ex-smoker quit 2 years ago after smoking occasionally on the weekends, does not need further lung cancer screenings at this point  Acid reflux on PPI continue to monitor    Plan:    Finished Eliquis  Obtain echocardiogram to evaluate right-sided pressures for CTEPH or PH related to PE  I will call her with results  Follow-up as needed  No lung cancer screenings for now as she does not fit criteria      History of Present Illness   HPI:    77-year-old female with a past medical history of acute intermediate low risk pulmonary embolism with DVT provoked by hysterectomy, diagnosed in June and placed on 3 months of Eliquis. She was seen in pulmonary clinic afterwards and tolerating well. She now follows up as she completed her Eliquis, no new complaints. She quit smoking about 2 years ago, is adopted therefore unsure about her family history of lung disease. Unsure about family history of lung disease   was on Eliquis 1 year after knee surgery  Ex-smoker  Works from home remote stays active    Review of Systems   Constitutional: Negative. HENT: Negative. Eyes: Negative. Respiratory: Negative. Cardiovascular: Negative. Gastrointestinal: Negative. Endocrine: Negative. Genitourinary: Negative. Musculoskeletal: Negative. Skin: Negative. Allergic/Immunologic: Negative. Neurological: Negative. Hematological: Negative. Psychiatric/Behavioral: Negative.           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     Meds/Allergies     Current Outpatient Medications:   •  Black Cohosh 160 MG CAPS, , Disp: , Rfl:   •  clonazePAM (KlonoPIN) 0.5 mg tablet, Take 1 tablet by mouth 2 (two) times a day as needed, Disp: , Rfl:   •  Iron-Vitamin C (IRON 100/C PO), , Disp: , Rfl:   •  omeprazole (PriLOSEC OTC) 20 MG tablet, , Disp: , Rfl:   •  Turmeric, Curcuma Longa, (Turmeric Root) POWD, , Disp: , Rfl:   •  acetaminophen (TYLENOL) 325 mg tablet, Take 2 tablets (650 mg total) by mouth every 6 (six) hours as needed for mild pain, headaches or fever, Disp: , Rfl: 0  •  albuterol (ProAir HFA) 90 mcg/act inhaler, Inhale 2 puffs every 6 (six) hours as needed for wheezing (Patient not taking: Reported on 2023), Disp: 8.5 g, Rfl: 0  •  apixaban (Eliquis) 5 mg, Take 1 tablet (5 mg total) by mouth 2 (two) times a day (Patient not taking: Reported on 11/15/2023), Disp: 80 tablet, Rfl: 0  •  nicotine (NICODERM CQ) 7 mg/24hr TD 24 hr patch, Place 1 patch on the skin over 24 hours daily Do not start before 2023., Disp: 28 patch, Rfl: 0  •  pantoprazole (PROTONIX) 40 mg tablet, Take 1 tablet (40 mg total) by mouth daily (Patient not taking: Reported on 8/8/2023), Disp: 30 tablet, Rfl: 0  No Known Allergies    Vitals: Blood pressure 122/82, pulse 76, resp. rate 12, height 5' 6" (1.676 m), weight 78.3 kg (172 lb 9.6 oz), last menstrual period 02/27/2020, SpO2 97 %, not currently breastfeeding., Body mass index is 27.86 kg/m². Oxygen Therapy  SpO2: 97 %    Physical Exam:    GEN: alert and oriented x 3, pleasant and cooperative HEENT:  Normocephalic, atraumatic, anicteric  NECK: No JVD   HEART: Rate, normal S1 and S2  LUNGS: Clear to auscultation bilaterally; no wheezes, rales, or rhonchi; respiration nonlabored   ABDOMEN:  Normoactive bowel sounds, soft, no tenderness, no distention  EXTREMITIES: peripheral pulses palpable; no edema  NEURO: no gross focal findings; cranial nerves grossly intact   SKIN:  Dry, intact, warm to touch    Labs: I have personally reviewed pertinent lab results. No results found for: "IGE"    Imaging and other studies: I have personally reviewed pertinent reports. Pulmonary function testing:  NA    Other Studies: I have personally reviewed pertinent reports.    Echo with no RV strain    Asaf Mancuso MD  Pulmonary and Critical Care Medicine

## 2023-11-15 NOTE — PATIENT INSTRUCTIONS
It was a pleasure seeing you today!     Obtain Echocardiogram  Follow up as needed     Megan Thapa MD  Pulmonary and Critical Care Medicine

## 2023-11-29 ENCOUNTER — TELEPHONE (OUTPATIENT)
Dept: PULMONOLOGY | Facility: MEDICAL CENTER | Age: 53
End: 2023-11-29

## 2023-11-29 ENCOUNTER — HOSPITAL ENCOUNTER (OUTPATIENT)
Dept: NON INVASIVE DIAGNOSTICS | Facility: HOSPITAL | Age: 53
Discharge: HOME | End: 2023-11-29
Attending: STUDENT IN AN ORGANIZED HEALTH CARE EDUCATION/TRAINING PROGRAM
Payer: COMMERCIAL

## 2023-11-29 VITALS
SYSTOLIC BLOOD PRESSURE: 116 MMHG | WEIGHT: 172 LBS | BODY MASS INDEX: 27.64 KG/M2 | DIASTOLIC BLOOD PRESSURE: 83 MMHG | HEART RATE: 62 BPM | HEIGHT: 66 IN

## 2023-11-29 DIAGNOSIS — I26.93 SINGLE SUBSEGMENTAL PULMONARY EMBOLISM WITHOUT ACUTE COR PULMONALE (HCC): ICD-10-CM

## 2023-11-29 LAB
AORTIC ROOT: 2.7 CM
APICAL FOUR CHAMBER EJECTION FRACTION: 42 %
AV LVOT PEAK GRADIENT: 4 MMHG
AV PEAK GRADIENT: 5 MMHG
DOP CALC LVOT AREA: 3.14 CM2
DOP CALC LVOT DIAMETER: 2 CM
E WAVE DECELERATION TIME: 175 MS
E/A RATIO: 0.9
FRACTIONAL SHORTENING: 35 (ref 28–44)
GLOBAL LONGITUIDAL STRAIN: -18 %
INTERVENTRICULAR SEPTUM IN DIASTOLE (PARASTERNAL SHORT AXIS VIEW): 0.9 CM
INTERVENTRICULAR SEPTUM: 0.9 CM (ref 0.6–1.1)
LAAS-AP2: 16.6 CM2
LAAS-AP4: 14.7 CM2
LEFT ATRIUM AREA SYSTOLE SINGLE PLANE A4C: 16 CM2
LEFT ATRIUM SIZE: 3.8 CM
LEFT ATRIUM VOLUME (MOD BIPLANE): 38 ML
LEFT ATRIUM VOLUME INDEX (MOD BIPLANE): 20.2 ML/M2
LEFT INTERNAL DIMENSION IN SYSTOLE: 2.8 CM (ref 2.1–4)
LEFT VENTRICULAR INTERNAL DIMENSION IN DIASTOLE: 4.3 CM (ref 3.5–6)
LEFT VENTRICULAR POSTERIOR WALL IN END DIASTOLE: 0.8 CM
LEFT VENTRICULAR STROKE VOLUME: 53 ML
LVSV (TEICH): 53 ML
MV E'TISSUE VEL-LAT: 10 CM/S
MV E'TISSUE VEL-SEP: 11 CM/S
MV PEAK A VEL: 0.92 M/S
MV PEAK E VEL: 83 CM/S
MV STENOSIS PRESSURE HALF TIME: 51 MS
MV VALVE AREA P 1/2 METHOD: 4.31
RIGHT ATRIUM AREA SYSTOLE A4C: 16 CM2
RIGHT VENTRICLE ID DIMENSION: 3.3 CM
SL CV LEFT ATRIUM LENGTH A2C: 5.2 CM
SL CV LV EF: 63
SL CV PED ECHO LEFT VENTRICLE DIASTOLIC VOLUME (MOD BIPLANE) 2D: 83 ML
SL CV PED ECHO LEFT VENTRICLE SYSTOLIC VOLUME (MOD BIPLANE) 2D: 29 ML
TR MAX PG: 20 MMHG
TR PEAK VELOCITY: 2.3 M/S
TRICUSPID ANNULAR PLANE SYSTOLIC EXCURSION: 2.2 CM
TRICUSPID VALVE PEAK REGURGITATION VELOCITY: 2.26 M/S

## 2023-11-29 PROCEDURE — 93306 TTE W/DOPPLER COMPLETE: CPT | Performed by: INTERNAL MEDICINE

## 2023-11-29 PROCEDURE — 93306 TTE W/DOPPLER COMPLETE: CPT

## 2024-02-13 ENCOUNTER — HOSPITAL ENCOUNTER (EMERGENCY)
Facility: HOSPITAL | Age: 54
Discharge: HOME | End: 2024-02-13
Attending: EMERGENCY MEDICINE
Payer: COMMERCIAL

## 2024-02-13 VITALS
SYSTOLIC BLOOD PRESSURE: 162 MMHG | TEMPERATURE: 98.2 F | RESPIRATION RATE: 20 BRPM | HEART RATE: 71 BPM | DIASTOLIC BLOOD PRESSURE: 92 MMHG | OXYGEN SATURATION: 99 %

## 2024-02-13 DIAGNOSIS — H53.9 VISUAL DISTURBANCE: Primary | ICD-10-CM

## 2024-02-13 LAB — GLUCOSE SERPL-MCNC: 107 MG/DL (ref 65–140)

## 2024-02-13 PROCEDURE — 82948 REAGENT STRIP/BLOOD GLUCOSE: CPT

## 2024-02-13 PROCEDURE — 99284 EMERGENCY DEPT VISIT MOD MDM: CPT | Performed by: EMERGENCY MEDICINE

## 2024-02-13 PROCEDURE — 99284 EMERGENCY DEPT VISIT MOD MDM: CPT

## 2024-02-13 PROCEDURE — 93005 ELECTROCARDIOGRAM TRACING: CPT

## 2024-02-13 NOTE — ED PROVIDER NOTES
"History  Chief Complaint   Patient presents with    Numbness     Pt c/o r sided numbness/ r eye she sees colors and movement out of r eye. Pt also feels ligjhtheaded. Symptoms started about half an hour ago     HPI  Patient is a 53-year-old female presenting for evaluation of multiple symptoms including \"kaleidoscope image\" in the right eye, tingling in the tips of her right hand and right foot.  Patient states that she had the visual changes about a week ago for part of the day and then went away.  Patient states that the symptoms started again about 30 minutes prior to coming into the emergency department.  Patient denies eye pain, headache, denies any head trauma or trauma to the eye.  Patient wears glasses which she has brought with her.  Patient denies additional neurologic symptoms, focal weakness, word finding difficulty, confusion, states that she feels very anxious.  Prior to Admission Medications   Prescriptions Last Dose Informant Patient Reported? Taking?   Black Cohosh 160 MG CAPS  Self Yes No   Iron-Vitamin C (IRON 100/C PO)  Self Yes No   Turmeric, Curcuma Longa, (Turmeric Root) POWD  Self Yes No   acetaminophen (TYLENOL) 325 mg tablet  Self No No   Sig: Take 2 tablets (650 mg total) by mouth every 6 (six) hours as needed for mild pain, headaches or fever   albuterol (ProAir HFA) 90 mcg/act inhaler  Self No No   Sig: Inhale 2 puffs every 6 (six) hours as needed for wheezing   Patient not taking: Reported on 8/8/2023   apixaban (Eliquis) 5 mg   No No   Sig: Take 1 tablet (5 mg total) by mouth 2 (two) times a day   Patient not taking: Reported on 11/15/2023   clonazePAM (KlonoPIN) 0.5 mg tablet  Self Yes No   Sig: Take 1 tablet by mouth 2 (two) times a day as needed   nicotine (NICODERM CQ) 7 mg/24hr TD 24 hr patch  Self No No   Sig: Place 1 patch on the skin over 24 hours daily Do not start before June 20, 2023.   omeprazole (PriLOSEC OTC) 20 MG tablet  Self Yes No   pantoprazole (PROTONIX) 40 mg " tablet  Self No No   Sig: Take 1 tablet (40 mg total) by mouth daily   Patient not taking: Reported on 2023      Facility-Administered Medications: None       Past Medical History:   Diagnosis Date    Patient denies medical problems        Past Surgical History:   Procedure Laterality Date    CHOLECYSTECTOMY      HYSTERECTOMY N/A 2023    TONSILLECTOMY         Family History   Adopted: Yes   Family history unknown: Yes     I have reviewed and agree with the history as documented.    E-Cigarette/Vaping    E-Cigarette Use Never User      E-Cigarette/Vaping Substances     Social History     Tobacco Use    Smoking status: Former     Current packs/day: 0.00     Average packs/day: 0.3 packs/day for 10.0 years (2.5 ttl pk-yrs)     Types: Cigarettes     Start date: 2012     Quit date: 2022     Years since quittin.1    Smokeless tobacco: Never    Tobacco comments:     1 pack a week   Vaping Use    Vaping status: Never Used   Substance Use Topics    Alcohol use: Yes     Comment: weekends    Drug use: Yes     Types: Marijuana     Comment: occasionally       Review of Systems   Constitutional:  Negative for chills, fatigue and fever.   Eyes:  Positive for visual disturbance. Negative for photophobia, pain, discharge, redness and itching.   Gastrointestinal:  Negative for diarrhea, nausea and vomiting.   Neurological:  Positive for numbness (Tingling tips of right hand and right toes). Negative for weakness and headaches.   Psychiatric/Behavioral:  Negative for confusion.    All other systems reviewed and are negative.      Physical Exam  Physical Exam  Vitals and nursing note reviewed.   Constitutional:       General: She is not in acute distress.     Appearance: Normal appearance. She is not ill-appearing, toxic-appearing or diaphoretic.      Comments: Anxious appearing but nontoxic nondistressed   HENT:      Head: Normocephalic and atraumatic.      Comments: Moist mucous membranes     Right Ear: External  "ear normal.      Left Ear: External ear normal.   Eyes:      General:         Right eye: No discharge.         Left eye: No discharge.   Cardiovascular:      Comments: Regular rate and rhythm, no murmurs rubs or gallops.  Extremities warm and well-perfused without mottling  Pulmonary:      Effort: No respiratory distress.      Comments: No increased work of breathing.  Speaking in complete sentences.  Lungs clear to auscultation bilaterally without wheezes, rales, rhonchi.  Satting 99% on room air indicating adequate oxygenation.  Abdominal:      General: There is no distension.      Comments: Abdomen soft, nontender, nondistended without rigidity, rebound, guarding   Musculoskeletal:         General: No deformity.      Cervical back: Normal range of motion.   Skin:     Findings: No lesion or rash.   Neurological:      Mental Status: She is alert and oriented to person, place, and time. Mental status is at baseline.      Comments: Cranial nerves II through XII intact.  Full strength and sensation bilaterally in upper and lower extremities.  No pronator drift.  Vision grossly intact but stating abnormal in the right eye secondary to \"kaleidoscope image\".   Psychiatric:         Mood and Affect: Mood and affect normal.         Vital Signs  ED Triage Vitals   Temperature Pulse Respirations Blood Pressure SpO2   02/13/24 1607 02/13/24 1607 02/13/24 1603 02/13/24 1607 02/13/24 1607   98.2 °F (36.8 °C) 71 20 162/92 99 %      Temp Source Heart Rate Source Patient Position - Orthostatic VS BP Location FiO2 (%)   02/13/24 1607 02/13/24 1603 -- 02/13/24 1603 --   Tympanic Monitor  Right arm       Pain Score       --                  Vitals:    02/13/24 1607   BP: 162/92   Pulse: 71         Visual Acuity      ED Medications  Medications - No data to display    Diagnostic Studies  Results Reviewed       Procedure Component Value Units Date/Time    Fingerstick Glucose (POCT) [[id number]]  (Normal) Collected: 02/13/24 1602    Lab " Status: Final result Updated: 02/13/24 1608     POC Glucose 107 mg/dl                    No orders to display              Procedures  POC Ocular US    Date/Time: 2/13/2024 6:45 PM    Performed by: Antonio Valentine MD  Authorized by: Antonio Valentine MD    Patient location:  ED  Performed by:  Attending  Procedure details:     Exam Type:  Diagnostic    Indications: visual change      Assessment for: retinal detachment, vitreous hemorrhage and lens dislocation      Eye(s) assessed:  Right eye    Structures visualized: anterior chamber, posterior chamber, lens and optic nerve      Image quality: diagnostic      Image availability:  Not saved  Right eye findings:     Foreign body: not identified      Retinal contour: normal      Lens: normal      Vitreous body: anechoic    Interpretation:     Ocular US impressions: normal             ED Course                                             Medical Decision Making  I obtained history from the patient.  Patient with tingling in the tips of the finger and toe most consistent with anxiety, hyperventilation.  Patient with transient symptoms in the right eye which resolved while in the emergency department.  Point-of-care ocular ultrasound unremarkable.  Provided patient with information of follow-up with ophthalmology for additional evaluation in the next couple days, provided with reassurance, discharged with return precautions.    Amount and/or Complexity of Data Reviewed  Labs: ordered.             Disposition  Final diagnoses:   Visual disturbance     Time reflects when diagnosis was documented in both MDM as applicable and the Disposition within this note       Time User Action Codes Description Comment    2/13/2024  5:24 PM Antonio Valentine Add [H53.9] Visual disturbance           ED Disposition       ED Disposition   Discharge    Condition   Stable    Date/Time   Tue Feb 13, 2024  5:24 PM    Comment   Barbara A Valentino discharge to home/self care.                    Follow-up Information       Follow up With Specialties Details Why Contact Info Additional Information    [facility] Emergency Department Emergency Medicine  If symptoms worsen [address] [address]  [phone] [facility] Emergency Department, [address], [place], [address]    Gavino Alvarado MD Ophthalmology   [address] [address]  [phone]       [facility] Ophthalmology   [address] [facility]  [phone]               Patient's Medications   Discharge Prescriptions    No medications on file       No discharge procedures on file.    PDMP Review         Value Time User    PDMP Reviewed  Yes 6/17/2023  2:44 PM Gabo Whipple MD            ED Provider  Electronically Signed by             Antonio Valentine MD  02/13/24 5250

## 2024-02-13 NOTE — DISCHARGE INSTRUCTIONS
Follow-up with either your ophthalmologist or Dr. Alvarado or [facility] in the next few days for reassessment and dedicated dilated funduscopic exam.  If you have any severe eye pain, severe headache, confusion, new weakness or numbness, return to the emergency department.

## 2024-02-14 LAB
ATRIAL RATE: 57 BPM
P AXIS: 59 DEGREES
PR INTERVAL: 134 MS
QRS AXIS: 48 DEGREES
QRSD INTERVAL: 78 MS
QT INTERVAL: 424 MS
QTC INTERVAL: 412 MS
T WAVE AXIS: 80 DEGREES
VENTRICULAR RATE: 57 BPM

## 2024-02-14 PROCEDURE — 93010 ELECTROCARDIOGRAM REPORT: CPT | Performed by: INTERNAL MEDICINE

## 2024-03-07 ENCOUNTER — OFFICE VISIT (OUTPATIENT)
Dept: URGENT CARE | Facility: CLINIC | Age: 54
End: 2024-03-07
Payer: COMMERCIAL

## 2024-03-07 VITALS
HEART RATE: 92 BPM | SYSTOLIC BLOOD PRESSURE: 118 MMHG | OXYGEN SATURATION: 97 % | DIASTOLIC BLOOD PRESSURE: 86 MMHG | BODY MASS INDEX: 27 KG/M2 | HEIGHT: 66 IN | TEMPERATURE: 97.2 F | RESPIRATION RATE: 16 BRPM | WEIGHT: 168 LBS

## 2024-03-07 DIAGNOSIS — L23.7 POISON IVY DERMATITIS: Primary | ICD-10-CM

## 2024-03-07 PROCEDURE — 99213 OFFICE O/P EST LOW 20 MIN: CPT | Performed by: PHYSICIAN ASSISTANT

## 2024-03-07 RX ORDER — CLOBETASOL PROPIONATE 0.5 MG/G
CREAM TOPICAL 2 TIMES DAILY
Qty: 30 G | Refills: 0 | Status: SHIPPED | OUTPATIENT
Start: 2024-03-07

## 2024-03-07 RX ORDER — METHYLPREDNISOLONE 4 MG/1
TABLET ORAL
Qty: 1 EACH | Refills: 0 | Status: SHIPPED | OUTPATIENT
Start: 2024-03-07

## 2024-03-07 NOTE — PATIENT INSTRUCTIONS
Poison Ivy Dermatitis:   -The patients hx and presentation is consistent with poison ivy dermatitis. Will treat with Medrol Dose Humphrey to be taken as prescribed. Take with food.   -Stay well hydrated  -Cool compress for comfort   -Clobetasol topically for comfort and reduction of itching  -Benadryl for itching as discussed  -Oatmeal bath for comfort   -Keep the rash clean and dry otherwise. Monitor for signs of infection.   -If your rash worsens or persist see your PCP for follow up or consider Dermatology follow up

## 2024-03-07 NOTE — PROGRESS NOTES
[facility]        NAME: Barbara A Valentino is a 53 y.o. female  : 1970    MRN: [id number]  DATE: 2024  TIME: 9:33 AM    Assessment and Plan   Poison ivy dermatitis [L23.7]  1. Poison ivy dermatitis  methylPREDNISolone 4 MG tablet therapy pack    clobetasol (TEMOVATE) 0.05 % cream            Patient Instructions   Poison Ivy Dermatitis:   -The patients hx and presentation is consistent with poison ivy dermatitis. Will treat with Medrol Dose Humphrey to be taken as prescribed. Take with food.   -Stay well hydrated  -Cool compress for comfort   -Clobetasol topically for comfort and reduction of itching  -Benadryl for itching as discussed  -Oatmeal bath for comfort   -Keep the rash clean and dry otherwise. Monitor for signs of infection.   -If your rash worsens or persist see your PCP for follow up or consider Dermatology follow up         Follow up with PCP in 3-5 days.  Proceed to  ER if symptoms worsen.    If tests have been performed at [facility], our office will contact you with results if changes need to be made to the care plan discussed with you at the visit.  You can review your full results on [facility].    Chief Complaint     Chief Complaint   Patient presents with    Rash     Was working in the yard yesterday and rash started. Worse today still spreading. No otc medicine taken.          History of Present Illness       The patient is a 53-year-old female who presents today for concern for poison ivy exposure. She states that she was outside doing yard work two days ago when she noticed a rash over her upper extremities bilaterally yesterday. She states that the rash is now spreading and is pruritic. She states that the rash is on her facial area and lower extremities in addition to her L flank/hip area. She has not attempted OTC measures. No facial swelling, no trouble swallowing, no dyspnea, wheezing, stridor. No dizziness. No sweats. No cp or palpitations. She has a hx of  poison ivy and poison sumac rashes and reactions.         Review of Systems   Review of Systems   Constitutional:  Negative for activity change, appetite change, chills, diaphoresis, fatigue and fever.   HENT:  Negative for facial swelling, sore throat and trouble swallowing.    Respiratory:  Negative for chest tightness, shortness of breath, wheezing and stridor.    Cardiovascular:  Negative for chest pain and palpitations.   Skin:  Positive for rash.   Allergic/Immunologic: Negative for environmental allergies, food allergies and immunocompromised state.   Neurological:  Negative for dizziness and light-headedness.   Hematological:  Negative for adenopathy. Does not bruise/bleed easily.         Current Medications       Current Outpatient Medications:     Black Cohosh 160 MG CAPS, , Disp: , Rfl:     clobetasol (TEMOVATE) 0.05 % cream, Apply topically 2 (two) times a day, Disp: 30 g, Rfl: 0    clonazePAM (KlonoPIN) 0.5 mg tablet, Take 1 tablet by mouth 2 (two) times a day as needed, Disp: , Rfl:     Iron-Vitamin C (IRON 100/C PO), , Disp: , Rfl:     methylPREDNISolone 4 MG tablet therapy pack, Use as directed on package, Disp: 1 each, Rfl: 0    omeprazole (PriLOSEC OTC) 20 MG tablet, , Disp: , Rfl:     Turmeric, Curcuma Longa, (Turmeric Root) POWD, , Disp: , Rfl:     acetaminophen (TYLENOL) 325 mg tablet, Take 2 tablets (650 mg total) by mouth every 6 (six) hours as needed for mild pain, headaches or fever, Disp: , Rfl: 0    albuterol (ProAir HFA) 90 mcg/act inhaler, Inhale 2 puffs every 6 (six) hours as needed for wheezing (Patient not taking: Reported on 8/8/2023), Disp: 8.5 g, Rfl: 0    apixaban (Eliquis) 5 mg, Take 1 tablet (5 mg total) by mouth 2 (two) times a day (Patient not taking: Reported on 11/15/2023), Disp: 80 tablet, Rfl: 0    nicotine (NICODERM CQ) 7 mg/24hr TD 24 hr patch, Place 1 patch on the skin over 24 hours daily Do not start before June 20, 2023., Disp: 28 patch, Rfl: 0    pantoprazole  "(PROTONIX) 40 mg tablet, Take 1 tablet (40 mg total) by mouth daily (Patient not taking: Reported on 8/8/2023), Disp: 30 tablet, Rfl: 0    Current Allergies     Allergies as of 03/07/2024 - Reviewed 03/07/2024   Allergen Reaction Noted    Mary - food allergy Swelling 03/07/2024            The following portions of the patient's history were reviewed and updated as appropriate: allergies, current medications, past family history, past medical history, past social history, past surgical history and problem list.     Past Medical History:   Diagnosis Date    Patient denies medical problems        Past Surgical History:   Procedure Laterality Date    CHOLECYSTECTOMY      HYSTERECTOMY N/A 06/06/2023    TONSILLECTOMY         Family History   Adopted: Yes   Family history unknown: Yes         Medications have been verified.        Objective   /86   Pulse 92   Temp (!) 97.2 °F (36.2 °C)   Resp 16   Ht 5' 6\" (1.676 m)   Wt 76.2 kg (168 lb)   LMP 02/27/2020   SpO2 97%   BMI 27.12 kg/m²   Patient's last menstrual period was 02/27/2020.       Physical Exam     Physical Exam  Vitals and nursing note reviewed.   Constitutional:       General: She is not in acute distress.     Appearance: She is well-developed. She is not diaphoretic.   HENT:      Head: Normocephalic and atraumatic.      Comments: No facial swelling      Mouth/Throat:      Pharynx: Uvula midline.      Comments: Airway is patent   Cardiovascular:      Rate and Rhythm: Normal rate and regular rhythm.      Pulses: Normal pulses.      Heart sounds: Normal heart sounds, S1 normal and S2 normal. No murmur heard.  Pulmonary:      Effort: Pulmonary effort is normal. No tachypnea, accessory muscle usage or respiratory distress.      Breath sounds: Normal breath sounds. No stridor. No decreased breath sounds, wheezing, rhonchi or rales.   Skin:     Capillary Refill: Capillary refill takes less than 2 seconds.      Findings: Rash present. Rash is vesicular " (There is a diffuse erythematous, vesicular, maculopapular rash predominately on the lower legs, forearms, L hip and L cheek. No oozing or drainage.).

## 2024-09-04 ENCOUNTER — APPOINTMENT (OUTPATIENT)
Dept: RADIOLOGY | Facility: CLINIC | Age: 54
End: 2024-09-04
Payer: COMMERCIAL

## 2024-09-04 ENCOUNTER — OFFICE VISIT (OUTPATIENT)
Dept: OBGYN CLINIC | Facility: CLINIC | Age: 54
End: 2024-09-04
Payer: COMMERCIAL

## 2024-09-04 VITALS
SYSTOLIC BLOOD PRESSURE: 106 MMHG | HEIGHT: 66 IN | BODY MASS INDEX: 27 KG/M2 | DIASTOLIC BLOOD PRESSURE: 75 MMHG | HEART RATE: 79 BPM | WEIGHT: 168 LBS

## 2024-09-04 DIAGNOSIS — M23.91 INTERNAL DERANGEMENT OF RIGHT KNEE: Primary | ICD-10-CM

## 2024-09-04 DIAGNOSIS — M25.561 RIGHT KNEE PAIN, UNSPECIFIED CHRONICITY: ICD-10-CM

## 2024-09-04 DIAGNOSIS — Z01.89 ENCOUNTER FOR LOWER EXTREMITY COMPARISON IMAGING STUDY: ICD-10-CM

## 2024-09-04 PROCEDURE — 73562 X-RAY EXAM OF KNEE 3: CPT

## 2024-09-04 PROCEDURE — 73560 X-RAY EXAM OF KNEE 1 OR 2: CPT

## 2024-09-04 PROCEDURE — 99204 OFFICE O/P NEW MOD 45 MIN: CPT | Performed by: ORTHOPAEDIC SURGERY

## 2024-09-04 NOTE — PROGRESS NOTES
Assessment/Plan:  1. Internal derangement of right knee  XR knee 3 vw right non injury    MRI knee right wo contrast      2. Encounter for lower extremity comparison imaging study  XR knee 1 or 2 vw left          Rere has right-sided knee pain with pinpoint tenderness along her medial compartment and no clear findings on x-ray.  There is a possibility that she has an underlying medial meniscus tear and this could be an acute issue.  I recommended MRI of the right knee and follow-up in the office after the MRI is complete.  She will avoid any activities that seem to exacerbate her symptoms such as vigorous physical activity or lateral movements.  Follow-up after MRI.    Subjective:   Barbara A Valentino is a 53 y.o. adult who presents to the office for evaluation for acute onset of right knee pain.  She states that she is very physically active and may have aggravated her knee with physical activity and exercise in the last 1 month.  She denies any knee pain prior to this.  She feels like she twisted her knee and then the pain seemed to increase a few days afterwards.  She describes a intermittent sharp stabbing pain to the medial portion of the right knee.  It seems to worsen with any lateral movement or squatting.  She is concerned that it may give out on her.  She denies any history of knee issue in the past.  It was very swollen initially but that swelling has gone down.  She still feels like there is fluid inside her knee and she sees a bulge to the superior portion of her knee.      Review of Systems   Constitutional:  Negative for chills, fever and unexpected weight change.   HENT:  Negative for hearing loss, nosebleeds and sore throat.    Eyes:  Negative for pain, redness and visual disturbance.   Respiratory:  Negative for cough, shortness of breath and wheezing.    Cardiovascular:  Negative for chest pain, palpitations and leg swelling.   Gastrointestinal:  Negative for abdominal pain, nausea and  vomiting.   Endocrine: Negative for polyphagia and polyuria.   Genitourinary:  Negative for dysuria and hematuria.   Musculoskeletal:         See HPI   Skin:  Negative for rash and wound.   Neurological:  Negative for dizziness, numbness and headaches.   Psychiatric/Behavioral:  Negative for decreased concentration and suicidal ideas. The patient is not nervous/anxious.          Past Medical History:   Diagnosis Date    Anxiety     Patient denies medical problems        Past Surgical History:   Procedure Laterality Date    CHOLECYSTECTOMY      HYSTERECTOMY N/A 2023    TONSILLECTOMY         Family History   Adopted: Yes   Family history unknown: Yes       Social History     Occupational History    Not on file   Tobacco Use    Smoking status: Former     Current packs/day: 0.00     Average packs/day: 0.3 packs/day for 10.0 years (2.5 ttl pk-yrs)     Types: Cigarettes     Start date: 2012     Quit date: 2022     Years since quittin.6    Smokeless tobacco: Never    Tobacco comments:     1 pack a week   Vaping Use    Vaping status: Never Used   Substance and Sexual Activity    Alcohol use: Not Currently     Comment: weekends    Drug use: Yes     Types: Marijuana     Comment: occasionally    Sexual activity: Yes         Current Outpatient Medications:     Black Cohosh 160 MG CAPS, , Disp: , Rfl:     clonazePAM (KlonoPIN) 0.5 mg tablet, Take 1 tablet by mouth 2 (two) times a day as needed, Disp: , Rfl:     omeprazole (PriLOSEC OTC) 20 MG tablet, , Disp: , Rfl:     Turmeric, Curcuma Longa, (Turmeric Root) POWD, , Disp: , Rfl:     acetaminophen (TYLENOL) 325 mg tablet, Take 2 tablets (650 mg total) by mouth every 6 (six) hours as needed for mild pain, headaches or fever, Disp: , Rfl: 0    albuterol (ProAir HFA) 90 mcg/act inhaler, Inhale 2 puffs every 6 (six) hours as needed for wheezing (Patient not taking: Reported on 2023), Disp: 8.5 g, Rfl: 0    apixaban (Eliquis) 5 mg, Take 1 tablet (5 mg total) by  mouth 2 (two) times a day (Patient not taking: Reported on 11/15/2023), Disp: 80 tablet, Rfl: 0    clobetasol (TEMOVATE) 0.05 % cream, Apply topically 2 (two) times a day (Patient not taking: Reported on 9/4/2024), Disp: 30 g, Rfl: 0    Iron-Vitamin C (IRON 100/C PO), , Disp: , Rfl:     methylPREDNISolone 4 MG tablet therapy pack, Use as directed on package (Patient not taking: Reported on 9/4/2024), Disp: 1 each, Rfl: 0    nicotine (NICODERM CQ) 7 mg/24hr TD 24 hr patch, Place 1 patch on the skin over 24 hours daily Do not start before June 20, 2023., Disp: 28 patch, Rfl: 0    pantoprazole (PROTONIX) 40 mg tablet, Take 1 tablet (40 mg total) by mouth daily (Patient not taking: Reported on 8/8/2023), Disp: 30 tablet, Rfl: 0    Allergies   Allergen Reactions    Mary - Food Allergy Swelling       Objective:  Vitals:    09/04/24 0819   BP: 106/75   Pulse: 79     Pain Score:   4      Right Knee Exam     Tenderness   The patient is experiencing tenderness in the medial joint line.    Range of Motion   Extension:  normal   Flexion:  130 abnormal     Tests   Joe:  Medial - positive Lateral - negative  Varus: negative Valgus: negative  Lachman:  Anterior - negative      Drawer:  Anterior - negative        Other   Erythema: absent  Sensation: normal  Pulse: present  Swelling: mild  Effusion: effusion present          Observations     Right Knee   Positive for effusion.       Physical Exam  Vitals reviewed.   Constitutional:       Appearance: She is well-developed.   HENT:      Head: Normocephalic and atraumatic.   Eyes:      Conjunctiva/sclera: Conjunctivae normal.      Pupils: Pupils are equal, round, and reactive to light.   Cardiovascular:      Rate and Rhythm: Normal rate.      Pulses: Normal pulses.   Pulmonary:      Effort: Pulmonary effort is normal. No respiratory distress.   Musculoskeletal:      Cervical back: Normal range of motion and neck supple.      Right knee: Effusion present.      Instability Tests:  Medial Joe test positive. Lateral Joe test negative.      Comments: As noted in HPI   Skin:     General: Skin is warm and dry.   Neurological:      General: No focal deficit present.      Mental Status: She is alert and oriented to person, place, and time.   Psychiatric:         Mood and Affect: Mood normal.         Behavior: Behavior normal.         I have personally reviewed pertinent films in PACS and my interpretation is as follows:  X-ray of the right knee demonstrates no evidence of acute abnormality.  Slight medial compartment narrowing.  Small effusion on lateral view      This document was created using speech voice recognition software.   Grammatical errors, random word insertions, pronoun errors, and incomplete sentences are an occasional consequence of this system due to software limitations, ambient noise, and hardware issues.   Any formal questions or concerns about content, text, or information contained within the body of this dictation should be directly addressed to the provider for clarification.

## 2024-09-21 ENCOUNTER — HOSPITAL ENCOUNTER (OUTPATIENT)
Dept: RADIOLOGY | Facility: HOSPITAL | Age: 54
Discharge: HOME | End: 2024-09-21
Attending: ORTHOPAEDIC SURGERY
Payer: COMMERCIAL

## 2024-09-21 DIAGNOSIS — M23.91 INTERNAL DERANGEMENT OF RIGHT KNEE: ICD-10-CM

## 2024-09-21 PROCEDURE — 73721 MRI JNT OF LWR EXTRE W/O DYE: CPT

## 2024-10-02 ENCOUNTER — OFFICE VISIT (OUTPATIENT)
Dept: OBGYN CLINIC | Facility: CLINIC | Age: 54
End: 2024-10-02
Payer: COMMERCIAL

## 2024-10-02 VITALS
DIASTOLIC BLOOD PRESSURE: 83 MMHG | HEART RATE: 69 BPM | SYSTOLIC BLOOD PRESSURE: 117 MMHG | WEIGHT: 168 LBS | BODY MASS INDEX: 27 KG/M2 | HEIGHT: 66 IN

## 2024-10-02 DIAGNOSIS — S83.231A COMPLEX TEAR OF MEDIAL MENISCUS OF RIGHT KNEE AS CURRENT INJURY, INITIAL ENCOUNTER: Primary | ICD-10-CM

## 2024-10-02 PROCEDURE — 99213 OFFICE O/P EST LOW 20 MIN: CPT | Performed by: ORTHOPAEDIC SURGERY

## 2024-10-02 NOTE — PROGRESS NOTES
Assessment/Plan:  1. Complex tear of medial meniscus of right knee as current injury, initial encounter  Ambulatory referral to Orthopedic Surgery          Rere has an MRI consistent with a complex posterior horn medial meniscus tear.  This is consistent with her exam and initial injury.  We discussed multiple treatment options in the office today including a conservative approach with physical therapy, cortisone injection and activity as tolerated.  I also discussed with her that she could be a candidate for arthroscopic surgery and meniscectomy.  She is not sure which direction she would want to go but due to her high physical activity level she is considering surgical procedure.  I would like for her to have a consultation with Dr. Hammond to discuss arthroscopic surgery for the meniscus at this time and she can weigh her options as far as treatment.  She was agreeable to this plan.  Follow-up with Dr. Hammond at the next available point.    Subjective:   Barbara A Valentino is a 53 y.o. adult who presents to the office for follow-up for right-sided knee pain.  She presented to the office 1 month ago with right-sided knee pain after increased physical activity over the month prior.  She was feeling pain over the medial aspect of the right knee and there is clinical concern for a medial meniscus tear.  She was sent for MRI.  She states the swelling has reduced in her right knee but she still feels occasional pain to the medial portion of the knee and she has been modifying her workouts.      Review of Systems   Constitutional:  Negative for chills, fever and unexpected weight change.   HENT:  Negative for hearing loss, nosebleeds and sore throat.    Eyes:  Negative for pain, redness and visual disturbance.   Respiratory:  Negative for cough, shortness of breath and wheezing.    Cardiovascular:  Negative for chest pain, palpitations and leg swelling.   Gastrointestinal:  Negative for abdominal pain, nausea  and vomiting.   Endocrine: Negative for polyphagia and polyuria.   Genitourinary:  Negative for dysuria and hematuria.   Musculoskeletal:         See HPI   Skin:  Negative for rash and wound.   Neurological:  Negative for dizziness, numbness and headaches.   Psychiatric/Behavioral:  Negative for decreased concentration and suicidal ideas. The patient is not nervous/anxious.          Past Medical History:   Diagnosis Date    Anxiety     Patient denies medical problems        Past Surgical History:   Procedure Laterality Date    CHOLECYSTECTOMY      HYSTERECTOMY N/A 2023    TONSILLECTOMY         Family History   Adopted: Yes   Family history unknown: Yes       Social History     Occupational History    Not on file   Tobacco Use    Smoking status: Former     Current packs/day: 0.00     Average packs/day: 0.3 packs/day for 10.0 years (2.5 ttl pk-yrs)     Types: Cigarettes     Start date: 2012     Quit date: 2022     Years since quittin.7    Smokeless tobacco: Never    Tobacco comments:     1 pack a week   Vaping Use    Vaping status: Never Used   Substance and Sexual Activity    Alcohol use: Not Currently     Comment: weekends    Drug use: Yes     Types: Marijuana     Comment: occasionally    Sexual activity: Yes         Current Outpatient Medications:     Black Cohosh 160 MG CAPS, , Disp: , Rfl:     clonazePAM (KlonoPIN) 0.5 mg tablet, Take 1 tablet by mouth 2 (two) times a day as needed, Disp: , Rfl:     omeprazole (PriLOSEC) 20 mg delayed release capsule, TAKE 1 CAPSULE BY MOUTH ONCE DAILY BEFORE A MEAL, Disp: , Rfl:     Turmeric, Curcuma Longa, (Turmeric Root) POWD, , Disp: , Rfl:     acetaminophen (TYLENOL) 325 mg tablet, Take 2 tablets (650 mg total) by mouth every 6 (six) hours as needed for mild pain, headaches or fever, Disp: , Rfl: 0    albuterol (ProAir HFA) 90 mcg/act inhaler, Inhale 2 puffs every 6 (six) hours as needed for wheezing (Patient not taking: Reported on 2023), Disp: 8.5 g,  Rfl: 0    apixaban (Eliquis) 5 mg, Take 1 tablet (5 mg total) by mouth 2 (two) times a day (Patient not taking: Reported on 11/15/2023), Disp: 80 tablet, Rfl: 0    clobetasol (TEMOVATE) 0.05 % cream, Apply topically 2 (two) times a day (Patient not taking: Reported on 9/4/2024), Disp: 30 g, Rfl: 0    Iron-Vitamin C (IRON 100/C PO), , Disp: , Rfl:     methylPREDNISolone 4 MG tablet therapy pack, Use as directed on package (Patient not taking: Reported on 9/4/2024), Disp: 1 each, Rfl: 0    nicotine (NICODERM CQ) 7 mg/24hr TD 24 hr patch, Place 1 patch on the skin over 24 hours daily Do not start before June 20, 2023., Disp: 28 patch, Rfl: 0    omeprazole (PriLOSEC OTC) 20 MG tablet, , Disp: , Rfl:     pantoprazole (PROTONIX) 40 mg tablet, Take 1 tablet (40 mg total) by mouth daily (Patient not taking: Reported on 8/8/2023), Disp: 30 tablet, Rfl: 0    Allergies   Allergen Reactions    Mary - Food Allergy Swelling       Objective:  Vitals:    10/02/24 0834   BP: 117/83   Pulse: 69     Pain Score:   2      Right Knee Exam     Tenderness   The patient is experiencing tenderness in the medial joint line.    Range of Motion   Extension:  normal   Flexion:  normal     Tests   Joe:  Medial - positive Lateral - negative  Varus: negative Valgus: negative    Other   Erythema: absent  Sensation: normal  Swelling: none  Effusion: no effusion present          Observations     Right Knee   Negative for effusion.       Physical Exam  Vitals reviewed.   Constitutional:       Appearance: She is well-developed.   HENT:      Head: Normocephalic and atraumatic.   Eyes:      Conjunctiva/sclera: Conjunctivae normal.      Pupils: Pupils are equal, round, and reactive to light.   Cardiovascular:      Rate and Rhythm: Normal rate.      Pulses: Normal pulses.   Pulmonary:      Effort: Pulmonary effort is normal. No respiratory distress.   Musculoskeletal:      Cervical back: Normal range of motion and neck supple.      Right knee: No  effusion.      Instability Tests: Medial Joe test positive. Lateral Joe test negative.      Comments: As noted in HPI   Skin:     General: Skin is warm and dry.   Neurological:      General: No focal deficit present.      Mental Status: She is alert and oriented to person, place, and time.   Psychiatric:         Mood and Affect: Mood normal.         Behavior: Behavior normal.         I have personally reviewed pertinent films in PACS and my interpretation is as follows:  MRI of the right knee demonstrates a complex posterior horn medial meniscus tear and mild knee effusion.      This document was created using speech voice recognition software.   Grammatical errors, random word insertions, pronoun errors, and incomplete sentences are an occasional consequence of this system due to software limitations, ambient noise, and hardware issues.   Any formal questions or concerns about content, text, or information contained within the body of this dictation should be directly addressed to the provider for clarification.

## 2024-10-04 ENCOUNTER — CONSULT (OUTPATIENT)
Age: 54
End: 2024-10-04
Payer: COMMERCIAL

## 2024-10-04 VITALS
DIASTOLIC BLOOD PRESSURE: 78 MMHG | HEART RATE: 90 BPM | WEIGHT: 168 LBS | SYSTOLIC BLOOD PRESSURE: 116 MMHG | BODY MASS INDEX: 27 KG/M2 | HEIGHT: 66 IN

## 2024-10-04 DIAGNOSIS — S83.231A COMPLEX TEAR OF MEDIAL MENISCUS OF RIGHT KNEE AS CURRENT INJURY, INITIAL ENCOUNTER: ICD-10-CM

## 2024-10-04 PROCEDURE — 99214 OFFICE O/P EST MOD 30 MIN: CPT | Performed by: ORTHOPAEDIC SURGERY

## 2024-10-04 NOTE — LETTER
October 4, 2024     Porfirio Hamilton DO  [address], [address]  [place] [address]    Patient: Barbara A Valentino   YOB: 1970   Date of Visit: 10/4/2024       Dear Dr. Hamilton:    Thank you for referring Barbara Valentino to me for evaluation. Below are my notes for this consultation.    If you have questions, please do not hesitate to call me. I look forward to following your patient along with you.         Sincerely,        Ehsan Hammond MD        CC: No Recipients    Piyush Capone PA-C  10/4/2024 12:25 PM  Sign when Signing Visit  Assessment/Plan:  1. Complex tear of medial meniscus of right knee as current injury, initial encounter  Ambulatory referral to Orthopedic Surgery        The patient's examination is consistent with her medial mensicus tear on MRI. We had a lengthy discussion regarding operative vs non-operative treatment. Non-operative treatment options would include physical therapy, corticosteroid injections, activity modification, and anti-inflammatories. Operative intervention would be a knee arthroscopy with medial meniscal repair vs meniscectomy.   The patient is quite active and would like to get back to all activity, and thus would like to proceed with arthroscopic medial meniscal repair vs meniscectomy. She will require pre-op labs, ECG, and medical clearance prior to surgery.   Given that the patient has failed conservative treatment and continues to have severe pain and/or dysfunction that limits their activities of daily living, they would like to proceed with operative intervention.  We discussed risks, benefits and alternatives to surgery today in the clinic. We discussed with the patient the risks of no treatment, non-operative treatment, and operative treatment. The risks of operative intervention were discussed and include but are not limited to: Infection, bleeding, stiffness, loss of range of motion, blood clot, failure of  surgery, fracture, delayed union, nonunion, malunion, risk of potential future arthritis, continued problems with swelling, injury to surrounding structures/nerve/artery/vein, recurrence of cysts, failure of hardware, retained hardware and/or foreign body, symptomatic hardware, and continued instability, pain, dysfunction, or disability despite repair.           Subjective:   Barbara A Valentino is a 53 y.o. adult who presents today for evaluation of right knee. SHe injured this about 2 months ago and saw Dr. Hamilton who had ordered an MRI which showed a medial meniscus tear. She usually enjoys running and crossfit type workouts , and has had to tone these down recently due to her knee. She notes mostly medial knee pain, which is worse at night. She notes intermittent swelling as well. She denies any mechanical symptoms or instability about the knee. She denies any paresthesias of the lower extremity.      Review of Systems   Constitutional: Negative.  Negative for chills and fever.   HENT: Negative.  Negative for ear pain and sore throat.    Eyes: Negative.  Negative for pain and redness.   Respiratory: Negative.  Negative for shortness of breath and wheezing.    Cardiovascular:  Negative for chest pain and palpitations.   Gastrointestinal: Negative.  Negative for abdominal pain and blood in stool.   Endocrine: Negative.  Negative for polydipsia and polyuria.   Genitourinary: Negative.  Negative for difficulty urinating and dysuria.   Musculoskeletal:         As noted in HPI   Skin: Negative.  Negative for pallor and rash.   Neurological: Negative.  Negative for dizziness and numbness.   Hematological: Negative.  Negative for adenopathy. Does not bruise/bleed easily.   Psychiatric/Behavioral: Negative.  Negative for confusion and suicidal ideas.          Past Medical History:   Diagnosis Date   • Anxiety    • Osteoarthritis 8/23   • Patient denies medical problems        Past Surgical History:   Procedure Laterality  Date   • CHOLECYSTECTOMY     • HYSTERECTOMY N/A 2023   • TONSILLECTOMY         Family History   Adopted: Yes   Family history unknown: Yes       Social History     Occupational History   • Not on file   Tobacco Use   • Smoking status: Former     Current packs/day: 0.00     Average packs/day: 0.3 packs/day for 10.0 years (2.5 ttl pk-yrs)     Types: Cigarettes     Start date: 2012     Quit date: 2022     Years since quittin.7   • Smokeless tobacco: Never   • Tobacco comments:     1 pack a week   Vaping Use   • Vaping status: Never Used   Substance and Sexual Activity   • Alcohol use: Yes     Alcohol/week: 9.0 standard drinks of alcohol     Types: 6 Cans of beer, 3 Standard drinks or equivalent per week     Comment: Weekends   • Drug use: Yes     Frequency: 2.0 times per week     Types: Marijuana     Comment: Recreationally and to fall asleep   • Sexual activity: Yes     Partners: Male     Birth control/protection: None         Current Outpatient Medications:   •  Black Cohosh 160 MG CAPS, , Disp: , Rfl:   •  clonazePAM (KlonoPIN) 0.5 mg tablet, Take 1 tablet by mouth 2 (two) times a day as needed, Disp: , Rfl:   •  Iron-Vitamin C (IRON 100/C PO), , Disp: , Rfl:   •  omeprazole (PriLOSEC) 20 mg delayed release capsule, TAKE 1 CAPSULE BY MOUTH ONCE DAILY BEFORE A MEAL, Disp: , Rfl:   •  Turmeric, Curcuma Longa, (Turmeric Root) POWD, , Disp: , Rfl:     Allergies   Allergen Reactions   • Ginger - Food Allergy Swelling       Objective:  Vitals:    10/04/24 1212   BP: 116/78   Pulse: 90            Right Knee Exam     Tenderness   The patient is experiencing tenderness in the medial joint line.    Range of Motion   Extension:  0   Flexion:  120     Tests   Varus: negative Valgus: negative  Drawer:  Anterior - negative    Posterior - negative    Other   Erythema: absent  Sensation: normal  Pulse: present  Effusion: effusion (trace) present          Observations     Right Knee   Positive for effusion (trace).        Physical Exam  Constitutional:       General: She is not in acute distress.     Appearance: She is well-developed.   HENT:      Head: Normocephalic and atraumatic.   Eyes:      General: No scleral icterus.     Conjunctiva/sclera: Conjunctivae normal.   Neck:      Vascular: No JVD.   Cardiovascular:      Rate and Rhythm: Normal rate.   Pulmonary:      Effort: Pulmonary effort is normal. No respiratory distress.   Musculoskeletal:      Right knee: Effusion (trace) present.      Comments: As per HPI   Skin:     General: Skin is warm.   Neurological:      Mental Status: She is alert and oriented to person, place, and time.      Coordination: Coordination normal.         I have personally reviewed pertinent films in PACS and my interpretation is as follows:  MRI right knee from 9/21/24: Complex tear medial meniscus without root involvement.       This document was created using speech voice recognition software.   Grammatical errors, random word insertions, pronoun errors, and incomplete sentences are an occasional consequence of this system due to software limitations, ambient noise, and hardware issues.   Any formal questions or concerns about content, text, or information contained within the body of this dictation should be directly addressed to the provider for clarification.

## 2024-10-04 NOTE — H&P (VIEW-ONLY)
Assessment/Plan:  1. Complex tear of medial meniscus of right knee as current injury, initial encounter  Ambulatory referral to Orthopedic Surgery        The patient's examination is consistent with her medial mensicus tear on MRI. We had a lengthy discussion regarding operative vs non-operative treatment. Non-operative treatment options would include physical therapy, corticosteroid injections, activity modification, and anti-inflammatories. Operative intervention would be a knee arthroscopy with medial meniscal repair vs meniscectomy.   The patient is quite active and would like to get back to all activity, and thus would like to proceed with arthroscopic medial meniscal repair vs meniscectomy. She will require pre-op labs, ECG, and medical clearance prior to surgery.   Given that the patient has failed conservative treatment and continues to have severe pain and/or dysfunction that limits their activities of daily living, they would like to proceed with operative intervention.  We discussed risks, benefits and alternatives to surgery today in the clinic. We discussed with the patient the risks of no treatment, non-operative treatment, and operative treatment. The risks of operative intervention were discussed and include but are not limited to: Infection, bleeding, stiffness, loss of range of motion, blood clot, failure of surgery, fracture, delayed union, nonunion, malunion, risk of potential future arthritis, continued problems with swelling, injury to surrounding structures/nerve/artery/vein, recurrence of cysts, failure of hardware, retained hardware and/or foreign body, symptomatic hardware, and continued instability, pain, dysfunction, or disability despite repair.           Subjective:   Barbara A Valentino is a 53 y.o. adult who presents today for evaluation of right knee. SHe injured this about 2 months ago and saw Dr. Hamilton who had ordered an MRI which showed a medial meniscus tear. She usually enjoys  running and crossfit type workouts , and has had to tone these down recently due to her knee. She notes mostly medial knee pain, which is worse at night. She notes intermittent swelling as well. She denies any mechanical symptoms or instability about the knee. She denies any paresthesias of the lower extremity.      Review of Systems   Constitutional: Negative.  Negative for chills and fever.   HENT: Negative.  Negative for ear pain and sore throat.    Eyes: Negative.  Negative for pain and redness.   Respiratory: Negative.  Negative for shortness of breath and wheezing.    Cardiovascular:  Negative for chest pain and palpitations.   Gastrointestinal: Negative.  Negative for abdominal pain and blood in stool.   Endocrine: Negative.  Negative for polydipsia and polyuria.   Genitourinary: Negative.  Negative for difficulty urinating and dysuria.   Musculoskeletal:         As noted in HPI   Skin: Negative.  Negative for pallor and rash.   Neurological: Negative.  Negative for dizziness and numbness.   Hematological: Negative.  Negative for adenopathy. Does not bruise/bleed easily.   Psychiatric/Behavioral: Negative.  Negative for confusion and suicidal ideas.          Past Medical History:   Diagnosis Date    Anxiety     Osteoarthritis     Patient denies medical problems        Past Surgical History:   Procedure Laterality Date    CHOLECYSTECTOMY      HYSTERECTOMY N/A 2023    TONSILLECTOMY         Family History   Adopted: Yes   Family history unknown: Yes       Social History     Occupational History    Not on file   Tobacco Use    Smoking status: Former     Current packs/day: 0.00     Average packs/day: 0.3 packs/day for 10.0 years (2.5 ttl pk-yrs)     Types: Cigarettes     Start date: 2012     Quit date: 2022     Years since quittin.7    Smokeless tobacco: Never    Tobacco comments:     1 pack a week   Vaping Use    Vaping status: Never Used   Substance and Sexual Activity    Alcohol use: Yes      Alcohol/week: 9.0 standard drinks of alcohol     Types: 6 Cans of beer, 3 Standard drinks or equivalent per week     Comment: Weekends    Drug use: Yes     Frequency: 2.0 times per week     Types: Marijuana     Comment: Recreationally and to fall asleep    Sexual activity: Yes     Partners: Male     Birth control/protection: None         Current Outpatient Medications:     Black Cohosh 160 MG CAPS, , Disp: , Rfl:     clonazePAM (KlonoPIN) 0.5 mg tablet, Take 1 tablet by mouth 2 (two) times a day as needed, Disp: , Rfl:     Iron-Vitamin C (IRON 100/C PO), , Disp: , Rfl:     omeprazole (PriLOSEC) 20 mg delayed release capsule, TAKE 1 CAPSULE BY MOUTH ONCE DAILY BEFORE A MEAL, Disp: , Rfl:     Turmeric, Curcuma Longa, (Turmeric Root) POWD, , Disp: , Rfl:     Allergies   Allergen Reactions    Ginger - Food Allergy Swelling       Objective:  Vitals:    10/04/24 1212   BP: 116/78   Pulse: 90            Right Knee Exam     Tenderness   The patient is experiencing tenderness in the medial joint line.    Range of Motion   Extension:  0   Flexion:  120     Tests   Varus: negative Valgus: negative  Drawer:  Anterior - negative    Posterior - negative    Other   Erythema: absent  Sensation: normal  Pulse: present  Effusion: effusion (trace) present          Observations     Right Knee   Positive for effusion (trace).       Physical Exam  Constitutional:       General: She is not in acute distress.     Appearance: She is well-developed.   HENT:      Head: Normocephalic and atraumatic.   Eyes:      General: No scleral icterus.     Conjunctiva/sclera: Conjunctivae normal.   Neck:      Vascular: No JVD.   Cardiovascular:      Rate and Rhythm: Normal rate.   Pulmonary:      Effort: Pulmonary effort is normal. No respiratory distress.   Musculoskeletal:      Right knee: Effusion (trace) present.      Comments: As per HPI   Skin:     General: Skin is warm.   Neurological:      Mental Status: She is alert and oriented to person,  place, and time.      Coordination: Coordination normal.         I have personally reviewed pertinent films in PACS and my interpretation is as follows:  MRI right knee from 9/21/24: Complex tear medial meniscus without root involvement.       This document was created using speech voice recognition software.   Grammatical errors, random word insertions, pronoun errors, and incomplete sentences are an occasional consequence of this system due to software limitations, ambient noise, and hardware issues.   Any formal questions or concerns about content, text, or information contained within the body of this dictation should be directly addressed to the provider for clarification.

## 2024-10-12 ENCOUNTER — APPOINTMENT (OUTPATIENT)
Dept: LAB | Facility: HOSPITAL | Age: 54
End: 2024-10-12
Payer: COMMERCIAL

## 2024-10-12 DIAGNOSIS — S83.231A COMPLEX TEAR OF MEDIAL MENISCUS OF RIGHT KNEE AS CURRENT INJURY, INITIAL ENCOUNTER: ICD-10-CM

## 2024-10-12 LAB
ANION GAP SERPL CALCULATED.3IONS-SCNC: 8 MMOL/L (ref 4–13)
BUN SERPL-MCNC: 18 MG/DL (ref 5–25)
CALCIUM SERPL-MCNC: 9.6 MG/DL (ref 8.4–10.2)
CHLORIDE SERPL-SCNC: 103 MMOL/L (ref 96–108)
CO2 SERPL-SCNC: 24 MMOL/L (ref 21–32)
CREAT SERPL-MCNC: 0.78 MG/DL (ref 0.6–1.3)
ERYTHROCYTE [DISTWIDTH] IN BLOOD BY AUTOMATED COUNT: 14.1 % (ref 11.6–15.1)
EST. AVERAGE GLUCOSE BLD GHB EST-MCNC: 123 MG/DL
GLUCOSE P FAST SERPL-MCNC: 110 MG/DL (ref 65–99)
HBA1C MFR BLD: 5.9 %
HCT VFR BLD AUTO: 41.2 % (ref 36.5–46.1)
HGB BLD-MCNC: 13.2 G/DL (ref 12–15.4)
MCH RBC QN AUTO: 28.6 PG (ref 26.8–34.3)
MCHC RBC AUTO-ENTMCNC: 32 G/DL (ref 31.4–37.4)
MCV RBC AUTO: 89 FL (ref 82–98)
PLATELET # BLD AUTO: 269 THOUSANDS/UL (ref 149–390)
PMV BLD AUTO: 9.6 FL (ref 8.9–12.7)
POTASSIUM SERPL-SCNC: 4 MMOL/L (ref 3.5–5.3)
RBC # BLD AUTO: 4.62 MILLION/UL (ref 3.88–5.12)
SODIUM SERPL-SCNC: 135 MMOL/L (ref 135–147)
WBC # BLD AUTO: 4.75 THOUSAND/UL (ref 4.31–10.16)

## 2024-10-12 PROCEDURE — 36415 COLL VENOUS BLD VENIPUNCTURE: CPT

## 2024-10-12 PROCEDURE — 83036 HEMOGLOBIN GLYCOSYLATED A1C: CPT

## 2024-10-12 PROCEDURE — 85027 COMPLETE CBC AUTOMATED: CPT

## 2024-10-12 PROCEDURE — 80048 BASIC METABOLIC PNL TOTAL CA: CPT

## 2024-10-14 LAB
ATRIAL RATE: 61 BPM
P AXIS: 55 DEGREES
PR INTERVAL: 120 MS
QRS AXIS: 64 DEGREES
QRSD INTERVAL: 78 MS
QT INTERVAL: 408 MS
QTC INTERVAL: 410 MS
T WAVE AXIS: 76 DEGREES
VENTRICULAR RATE: 61 BPM

## 2024-10-14 PROCEDURE — 93010 ELECTROCARDIOGRAM REPORT: CPT | Performed by: INTERNAL MEDICINE

## 2024-10-15 NOTE — PRE-PROCEDURE INSTRUCTIONS
Pre-Surgery Instructions:   Medication Instructions    Black Cohosh 160 MG CAPS Stop taking 7 days prior to surgery.    clonazePAM (KlonoPIN) 0.5 mg tablet Uses PRN- OK to take day of surgery    Iron-Vitamin C (IRON 100/C PO) Stop taking 7 days prior to surgery.    omeprazole (PriLOSEC) 20 mg delayed release capsule Take day of surgery.    Turmeric, Curcuma Longa, (Turmeric Root) POWD Stop taking 7 days prior to surgery.    Medication instructions for day surgery reviewed. Please use only a sip of water to take your instructed medications. Avoid all over the counter vitamins, supplements and NSAIDS for one week prior to surgery per anesthesia guidelines. Tylenol is ok to take as needed.     You will receive a call one business day prior to surgery with an arrival time and hospital directions. If your surgery is scheduled on a Monday, the hospital will be calling you on the Friday prior to your surgery. If you have not heard from anyone by 8pm, please call the hospital supervisor through the hospital  at [phone]. ([place] [phone] or [place] [phone]).    Do not eat or drink anything after midnight the night before your surgery, including candy, mints, lifesavers, or chewing gum. Do not drink alcohol 24hrs before your surgery. Try not to smoke at least 24hrs before your surgery.       Follow the pre surgery showering instructions as listed in the “My Surgical Experience Booklet” or otherwise provided by your surgeon's office. Do not use a blade to shave the surgical area 1 week before surgery. It is okay to use a clean electric clippers up to 24 hours before surgery. Do not apply any lotions, creams, including makeup, cologne, deodorant, or perfumes after showering on the day of your surgery. Do not use dry shampoo, hair spray, hair gel, or any type of hair products.     No contact lenses, eye make-up, or artificial eyelashes. Remove nail polish, including gel polish, and any artificial, gel,  or acrylic nails if possible. Remove all jewelry including rings and body piercing jewelry.     Wear causal clothing that is easy to take on and off. Consider your type of surgery.    Keep any valuables, jewelry, piercings at home. Please bring any specially ordered equipment (sling, braces) if indicated.    Arrange for a responsible person to drive you to and from the hospital on the day of your surgery. Please confirm the visitor policy for the day of your procedure when you receive your phone call with an arrival time.     Call the surgeon's office with any new illnesses, exposures, or additional questions prior to surgery.    Please reference your “My Surgical Experience Booklet” for additional information to prepare for your upcoming surgery.

## 2024-10-18 ENCOUNTER — OFFICE VISIT (OUTPATIENT)
Dept: FAMILY MEDICINE CLINIC | Facility: CLINIC | Age: 54
End: 2024-10-18
Payer: COMMERCIAL

## 2024-10-18 ENCOUNTER — ANESTHESIA EVENT (OUTPATIENT)
Dept: PERIOP | Facility: HOSPITAL | Age: 54
End: 2024-10-18
Payer: COMMERCIAL

## 2024-10-18 VITALS
DIASTOLIC BLOOD PRESSURE: 82 MMHG | SYSTOLIC BLOOD PRESSURE: 118 MMHG | OXYGEN SATURATION: 97 % | BODY MASS INDEX: 30.06 KG/M2 | TEMPERATURE: 97.8 F | HEART RATE: 93 BPM | RESPIRATION RATE: 16 BRPM | WEIGHT: 180.4 LBS | HEIGHT: 65 IN

## 2024-10-18 DIAGNOSIS — Z86.718 HISTORY OF DVT (DEEP VEIN THROMBOSIS): ICD-10-CM

## 2024-10-18 DIAGNOSIS — E66.811 CLASS 1 OBESITY DUE TO EXCESS CALORIES WITH BODY MASS INDEX (BMI) OF 30.0 TO 30.9 IN ADULT, UNSPECIFIED WHETHER SERIOUS COMORBIDITY PRESENT: ICD-10-CM

## 2024-10-18 DIAGNOSIS — Z90.710 S/P HYSTERECTOMY: ICD-10-CM

## 2024-10-18 DIAGNOSIS — Z01.818 PREOP EXAMINATION: Primary | ICD-10-CM

## 2024-10-18 DIAGNOSIS — Z78.0 POSTMENOPAUSAL: ICD-10-CM

## 2024-10-18 DIAGNOSIS — Z12.9 ENCOUNTER FOR SCREENING FOR MALIGNANT NEOPLASM: ICD-10-CM

## 2024-10-18 DIAGNOSIS — S83.231A COMPLEX TEAR OF MEDIAL MENISCUS OF RIGHT KNEE AS CURRENT INJURY, INITIAL ENCOUNTER: ICD-10-CM

## 2024-10-18 DIAGNOSIS — K21.9 GASTROESOPHAGEAL REFLUX DISEASE WITHOUT ESOPHAGITIS: ICD-10-CM

## 2024-10-18 DIAGNOSIS — E66.09 CLASS 1 OBESITY DUE TO EXCESS CALORIES WITH BODY MASS INDEX (BMI) OF 30.0 TO 30.9 IN ADULT, UNSPECIFIED WHETHER SERIOUS COMORBIDITY PRESENT: ICD-10-CM

## 2024-10-18 DIAGNOSIS — Z86.711 HISTORY OF PULMONARY EMBOLISM: ICD-10-CM

## 2024-10-18 PROCEDURE — 99215 OFFICE O/P EST HI 40 MIN: CPT | Performed by: FAMILY MEDICINE

## 2024-10-18 RX ORDER — SEMAGLUTIDE 0.25 MG/.5ML
INJECTION, SOLUTION SUBCUTANEOUS
Qty: 2 ML | Refills: 0 | Status: SHIPPED | OUTPATIENT
Start: 2024-10-18

## 2024-10-18 NOTE — PROGRESS NOTES
Assessment/Plan:    1. Preop examination  Comments:  Labs -acceptable range;  EKG w/o sig change from priors reported in chart. ECHO-EF=63% 11/2023 .  PT CLEARED FOR PROCEDURE  post-op dvt prophylaxis per ortho.  2. Complex tear of medial meniscus of right knee as current injury, initial encounter  -     Ambulatory referral to Internal Medicine  3. Encounter for screening for malignant neoplasm  -     Mammo screening bilateral w 3d and cad; Future  4. Postmenopausal  -     DXA bone density spine hip and pelvis; Future; Expected date: 10/18/2024  5. S/P hysterectomy  6. Gastroesophageal reflux disease without esophagitis  7. History of pulmonary embolism  8. History of DVT (deep vein thrombosis)  9. Class 1 obesity due to excess calories with body mass index (BMI) of 30.0 to 30.9 in adult, unspecified whether serious comorbidity present  -     Semaglutide-Weight Management (Wegovy) 0.25 MG/0.5ML; Inject 0.25 mg under the skin weekly  Schedule follow-up post-op to further address wgt mgt.  NP-Obtain prior records for further review--limited available at time of visit.        Subjective:      Patient ID: Barbara A Valentino is a 53 y.o. adult.    Chief Complaint   Patient presents with    Establish Care     Review lab result , patient says she is gaining a lot of weight lately    Pt having knee surgery 10/21/24--Dr. Hammond-needs labs/EKG reviewed for clearance    HPI  52yo pt in for pre-op exam for right knee surgery, Date of surgery: 10/21/24. Surgeon:Dr. Hammond. Indication-complex meniscal tear..  No acute c/o at time of visit and no known recent illness exposures.    Pt reports no adverse reaction to any prior anesthesia received including one or more of the following-general, epidural and spinal.    Pertinent medical and surgical history reviewed in problem lists.  Pt able to walk 4 blocks or 2 flights of stairs without any concerning cardiovascular symptoms.   Pt denies symptoms of easy  bruising/bleeding/chest pain/palitations/new or changing edema/cough/wheezing/dyspnea.    Pt denies excessive alcohol intake, tobacco or illicit drug use.Former smoker.      The patient's home  Living situation is secure and supportive and there are no post-op concerns in this regard.    The following portions of the patient's history were reviewed and updated as appropriate: allergies, current medications, past family history, past medical history, past social history, past surgical history and problem list.  Past Medical History:   Diagnosis Date    Anxiety     Arthritis     DVT (deep venous thrombosis) ([facility])     GERD (gastroesophageal reflux disease)     Osteoarthritis 8/23    Patient denies medical problems     PE (pulmonary thromboembolism) ([facility]) 2023    6 mos eliquis     Past Surgical History:   Procedure Laterality Date    CHOLECYSTECTOMY      HYSTERECTOMY N/A 06/06/2023    TONSILLECTOMY           Review of Systems   Constitutional:  Positive for fatigue. Negative for fever.   Respiratory:  Negative for shortness of breath.    Cardiovascular:  Negative for chest pain, palpitations and leg swelling.   Gastrointestinal:         Occ gerd     Musculoskeletal:  Positive for arthralgias.   Allergic/Immunologic: Positive for food allergies.   Neurological: Negative.    Psychiatric/Behavioral:  Positive for sleep disturbance. The patient is nervous/anxious.          Current Outpatient Medications   Medication Sig Dispense Refill    Black Cohosh 160 MG CAPS       clonazePAM (KlonoPIN) 0.5 mg tablet Take 1 tablet by mouth 2 (two) times a day as needed      Iron-Vitamin C (IRON 100/C PO)       omeprazole (PriLOSEC) 20 mg delayed release capsule TAKE 1 CAPSULE BY MOUTH ONCE DAILY BEFORE A MEAL      Semaglutide-Weight Management (Wegovy) 0.25 MG/0.5ML Inject 0.25 mg under the skin weekly 2 mL 0    Turmeric, Curcuma Longa, (Turmeric Root) POWD        No current facility-administered medications for this visit.  "    Allergies   Allergen Reactions    Mary - Food Allergy Swelling     Immunization History   Administered Date(s) Administered    COVID-19 Pfizer vac (Manuel-sucrose, gray cap) 12 yr+ IM 08/25/2021       Objective:    /82 (BP Location: Left arm, Patient Position: Sitting, Cuff Size: Standard)   Pulse 93   Temp 97.8 °F (36.6 °C)   Resp 16   Ht 5' 4.5\" (1.638 m)   Wt 81.8 kg (180 lb 6.4 oz)   LMP 02/27/2020   SpO2 97%   BMI 30.49 kg/m²        Physical Exam  Vitals and nursing note reviewed.   Constitutional:       General: She is not in acute distress.     Comments: OW   HENT:      Nose: Nose normal.      Mouth/Throat:      Pharynx: No oropharyngeal exudate.   Eyes:      General: No scleral icterus.     Conjunctiva/sclera: Conjunctivae normal.   Cardiovascular:      Rate and Rhythm: Normal rate and regular rhythm.   Pulmonary:      Effort: Pulmonary effort is normal. No respiratory distress.      Breath sounds: Normal breath sounds.   Abdominal:      General: Bowel sounds are normal.      Palpations: Abdomen is soft.      Tenderness: There is no abdominal tenderness.   Musculoskeletal:         General: Tenderness present.      Right lower leg: No edema.      Left lower leg: No edema.   Skin:     General: Skin is warm and dry.      Coloration: Skin is not jaundiced.   Neurological:      General: No focal deficit present.      Mental Status: She is alert and oriented to person, place, and time.      Cranial Nerves: No cranial nerve deficit.   Psychiatric:      Comments: Anxious           Tasha Bolton MD  "

## 2024-10-20 ENCOUNTER — TELEPHONE (OUTPATIENT)
Dept: OTHER | Facility: OTHER | Age: 54
End: 2024-10-20

## 2024-10-20 NOTE — TELEPHONE ENCOUNTER
Patient called to follow up with previous nurse she spoke to regarding anesthesia she would receive for procedure tomorrow. RN advised patient this tayo be discussed tomorrow morning with anesthesia team. Patient verbalized understanding.

## 2024-10-20 NOTE — TELEPHONE ENCOUNTER
ESC from Dr Casanova: She will meet her anesthesia team tomorrow and that will be discussed with her then.

## 2024-10-21 ENCOUNTER — ANESTHESIA (OUTPATIENT)
Dept: PERIOP | Facility: HOSPITAL | Age: 54
End: 2024-10-21
Payer: COMMERCIAL

## 2024-10-21 ENCOUNTER — HOSPITAL ENCOUNTER (OUTPATIENT)
Facility: HOSPITAL | Age: 54
Setting detail: OUTPATIENT SURGERY
Discharge: HOME | End: 2024-10-21
Attending: ORTHOPAEDIC SURGERY | Admitting: ORTHOPAEDIC SURGERY
Payer: COMMERCIAL

## 2024-10-21 ENCOUNTER — TELEPHONE (OUTPATIENT)
Age: 54
End: 2024-10-21

## 2024-10-21 ENCOUNTER — TELEPHONE (OUTPATIENT)
Dept: ADMINISTRATIVE | Facility: OTHER | Age: 54
End: 2024-10-21

## 2024-10-21 VITALS
HEIGHT: 66 IN | RESPIRATION RATE: 18 BRPM | SYSTOLIC BLOOD PRESSURE: 142 MMHG | OXYGEN SATURATION: 97 % | DIASTOLIC BLOOD PRESSURE: 81 MMHG | HEART RATE: 68 BPM | TEMPERATURE: 97.2 F | WEIGHT: 179.23 LBS | BODY MASS INDEX: 28.81 KG/M2

## 2024-10-21 DIAGNOSIS — S83.231A COMPLEX TEAR OF MEDIAL MENISCUS OF RIGHT KNEE AS CURRENT INJURY, INITIAL ENCOUNTER: Primary | ICD-10-CM

## 2024-10-21 PROCEDURE — 29881 ARTHRS KNE SRG MNISECTMY M/L: CPT | Performed by: ORTHOPAEDIC SURGERY

## 2024-10-21 PROCEDURE — 29881 ARTHRS KNE SRG MNISECTMY M/L: CPT | Performed by: PHYSICIAN ASSISTANT

## 2024-10-21 RX ORDER — ONDANSETRON 4 MG/1
4 TABLET, FILM COATED ORAL EVERY 8 HOURS PRN
Qty: 20 TABLET | Refills: 0 | Status: SHIPPED | OUTPATIENT
Start: 2024-10-21

## 2024-10-21 RX ORDER — OXYCODONE AND ACETAMINOPHEN 5; 325 MG/1; MG/1
1 TABLET ORAL ONCE
Status: CANCELLED | OUTPATIENT
Start: 2024-10-21 | End: 2024-10-21

## 2024-10-21 RX ORDER — ROPIVACAINE HYDROCHLORIDE 5 MG/ML
INJECTION, SOLUTION EPIDURAL; INFILTRATION; PERINEURAL AS NEEDED
Status: DISCONTINUED | OUTPATIENT
Start: 2024-10-21 | End: 2024-10-21 | Stop reason: HOSPADM

## 2024-10-21 RX ORDER — PROPOFOL 10 MG/ML
INJECTION, EMULSION INTRAVENOUS AS NEEDED
Status: DISCONTINUED | OUTPATIENT
Start: 2024-10-21 | End: 2024-10-21

## 2024-10-21 RX ORDER — CEFAZOLIN SODIUM 2 G/50ML
2000 SOLUTION INTRAVENOUS ONCE
Status: COMPLETED | OUTPATIENT
Start: 2024-10-21 | End: 2024-10-21

## 2024-10-21 RX ORDER — FENTANYL CITRATE/PF 50 MCG/ML
25 SYRINGE (ML) INJECTION
Status: DISCONTINUED | OUTPATIENT
Start: 2024-10-21 | End: 2024-10-21 | Stop reason: HOSPADM

## 2024-10-21 RX ORDER — ACETAMINOPHEN 500 MG
1000 TABLET ORAL EVERY 8 HOURS
Qty: 42 TABLET | Refills: 0 | Status: SHIPPED | OUTPATIENT
Start: 2024-10-21 | End: 2024-10-28

## 2024-10-21 RX ORDER — DEXAMETHASONE SODIUM PHOSPHATE 10 MG/ML
INJECTION, SOLUTION INTRAMUSCULAR; INTRAVENOUS AS NEEDED
Status: DISCONTINUED | OUTPATIENT
Start: 2024-10-21 | End: 2024-10-21

## 2024-10-21 RX ORDER — ENOXAPARIN SODIUM 100 MG/ML
40 INJECTION SUBCUTANEOUS DAILY
Qty: 5.6 ML | Refills: 0 | Status: SHIPPED | OUTPATIENT
Start: 2024-10-21 | End: 2024-10-22

## 2024-10-21 RX ORDER — LIDOCAINE HYDROCHLORIDE 10 MG/ML
INJECTION, SOLUTION EPIDURAL; INFILTRATION; INTRACAUDAL; PERINEURAL AS NEEDED
Status: DISCONTINUED | OUTPATIENT
Start: 2024-10-21 | End: 2024-10-21

## 2024-10-21 RX ORDER — FENTANYL CITRATE 50 UG/ML
INJECTION, SOLUTION INTRAMUSCULAR; INTRAVENOUS AS NEEDED
Status: DISCONTINUED | OUTPATIENT
Start: 2024-10-21 | End: 2024-10-21

## 2024-10-21 RX ORDER — NAPROXEN 500 MG/1
500 TABLET ORAL 2 TIMES DAILY WITH MEALS
Qty: 28 TABLET | Refills: 0 | Status: SHIPPED | OUTPATIENT
Start: 2024-10-21 | End: 2024-11-04

## 2024-10-21 RX ORDER — MEPERIDINE HYDROCHLORIDE 25 MG/ML
12.5 INJECTION INTRAMUSCULAR; INTRAVENOUS; SUBCUTANEOUS
Status: DISCONTINUED | OUTPATIENT
Start: 2024-10-21 | End: 2024-10-21 | Stop reason: HOSPADM

## 2024-10-21 RX ORDER — OXYCODONE HYDROCHLORIDE 5 MG/1
5 TABLET ORAL EVERY 4 HOURS PRN
Qty: 15 TABLET | Refills: 0 | Status: SHIPPED | OUTPATIENT
Start: 2024-10-21

## 2024-10-21 RX ORDER — ONDANSETRON 2 MG/ML
4 INJECTION INTRAMUSCULAR; INTRAVENOUS ONCE AS NEEDED
Status: DISCONTINUED | OUTPATIENT
Start: 2024-10-21 | End: 2024-10-21 | Stop reason: HOSPADM

## 2024-10-21 RX ORDER — SODIUM CHLORIDE, SODIUM LACTATE, POTASSIUM CHLORIDE, CALCIUM CHLORIDE 600; 310; 30; 20 MG/100ML; MG/100ML; MG/100ML; MG/100ML
INJECTION, SOLUTION INTRAVENOUS CONTINUOUS PRN
Status: DISCONTINUED | OUTPATIENT
Start: 2024-10-21 | End: 2024-10-21

## 2024-10-21 RX ORDER — SODIUM CHLORIDE, SODIUM LACTATE, POTASSIUM CHLORIDE, CALCIUM CHLORIDE 600; 310; 30; 20 MG/100ML; MG/100ML; MG/100ML; MG/100ML
75 INJECTION, SOLUTION INTRAVENOUS CONTINUOUS
Status: DISCONTINUED | OUTPATIENT
Start: 2024-10-21 | End: 2024-10-21 | Stop reason: HOSPADM

## 2024-10-21 RX ORDER — ONDANSETRON 2 MG/ML
INJECTION INTRAMUSCULAR; INTRAVENOUS AS NEEDED
Status: DISCONTINUED | OUTPATIENT
Start: 2024-10-21 | End: 2024-10-21

## 2024-10-21 RX ADMIN — FENTANYL CITRATE 50 MCG: 50 INJECTION, SOLUTION INTRAMUSCULAR; INTRAVENOUS at 12:46

## 2024-10-21 RX ADMIN — ONDANSETRON 4 MG: 2 INJECTION INTRAMUSCULAR; INTRAVENOUS at 12:43

## 2024-10-21 RX ADMIN — FENTANYL CITRATE 25 MCG: 50 INJECTION INTRAMUSCULAR; INTRAVENOUS at 13:52

## 2024-10-21 RX ADMIN — SODIUM CHLORIDE, SODIUM LACTATE, POTASSIUM CHLORIDE, AND CALCIUM CHLORIDE: .6; .31; .03; .02 INJECTION, SOLUTION INTRAVENOUS at 12:28

## 2024-10-21 RX ADMIN — CEFAZOLIN SODIUM 2000 MG: 2 SOLUTION INTRAVENOUS at 12:35

## 2024-10-21 RX ADMIN — FENTANYL CITRATE 50 MCG: 50 INJECTION, SOLUTION INTRAMUSCULAR; INTRAVENOUS at 12:40

## 2024-10-21 RX ADMIN — FENTANYL CITRATE 50 MCG: 50 INJECTION, SOLUTION INTRAMUSCULAR; INTRAVENOUS at 12:51

## 2024-10-21 RX ADMIN — FENTANYL CITRATE 25 MCG: 50 INJECTION INTRAMUSCULAR; INTRAVENOUS at 13:42

## 2024-10-21 RX ADMIN — LIDOCAINE HYDROCHLORIDE 50 MG: 10 INJECTION, SOLUTION EPIDURAL; INFILTRATION; INTRACAUDAL; PERINEURAL at 12:37

## 2024-10-21 RX ADMIN — PROPOFOL 200 MG: 10 INJECTION, EMULSION INTRAVENOUS at 12:37

## 2024-10-21 RX ADMIN — DEXAMETHASONE SODIUM PHOSPHATE 10 MG: 10 INJECTION, SOLUTION INTRAMUSCULAR; INTRAVENOUS at 12:43

## 2024-10-21 RX ADMIN — FENTANYL CITRATE 50 MCG: 50 INJECTION, SOLUTION INTRAMUSCULAR; INTRAVENOUS at 12:55

## 2024-10-21 NOTE — ANESTHESIA PREPROCEDURE EVALUATION
Procedure:  REPAIR medial MENISCUS vs meniscectomy (Right: Knee)    Relevant Problems   CARDIO   (+) DVT (deep venous thrombosis) (HCC)      GI/HEPATIC   (+) Gastroesophageal reflux disease without esophagitis      HEMATOLOGY   (+) Anemia      NEURO/PSYCH   (+) Anxiety        Physical Exam    Airway    Mallampati score: II  TM Distance: >3 FB  Neck ROM: full     Dental   No notable dental hx     Cardiovascular  Cardiovascular exam normal    Pulmonary  Pulmonary exam normal     Other Findings  post-pubertal.      Anesthesia Plan  ASA Score- 2     Anesthesia Type- general with ASA Monitors.         Additional Monitors:     Airway Plan: LMA.           Plan Factors-Exercise tolerance (METS): >4 METS.    Chart reviewed.   Existing labs reviewed. Patient summary reviewed.    Patient is not a current smoker.              Induction- intravenous.    Postoperative Plan- Plan for postoperative opioid use.     Perioperative Resuscitation Plan - Level 1 - Full Code.       Informed Consent- Anesthetic plan and risks discussed with patient.  I personally reviewed this patient with the CRNA. Discussed and agreed on the Anesthesia Plan with the CRNA..

## 2024-10-21 NOTE — TELEPHONE ENCOUNTER
Upon review of the In Basket request we  need more information to complete this request. Please give the provider or practice or location the patient went to for this procedure  and submit a new request.    Any additional questions or concerns should be emailed to the Practice Liaisons via the appropriate education email address, please do not reply via In Basket.    Thank you  Jamir Sawyer MA   [facility]

## 2024-10-21 NOTE — DISCHARGE INSTR - AVS FIRST PAGE
Postoperative Instructions Following Knee Surgery      MEDICATIONS:  Resume all home medications unless otherwise instructed by your surgeon.  Pain Medication:   Take Tylenol 1000mg three times a day and Naproxen 500mg twice daily on prescribed schedule for 7 days  Take 5mg Oxycodone as needed every 4-6 hours for severe pain   If you were given a regional anesthetic (nerve block), it is helpful to take your pain medication before the block wear off.    Possible side effects include nausea, constipation, and urinary retention.  If you experience these side effects, please call our office for assistance.  Pain med refills are authorized only during office hours (8am-4pm, Mon-Fri).  Nausea Medication:   Zofran 4mg, take 1 tablet every 6 hours as needed for nausea or vomiting   Fill prescription ONLY if you expericnce severe nausea.  Blood Clot Prevention:   Pump your foot up and down 20 times per hour while you are less mobile.  Ambulate with your crutches at least once every hour   Lovenox 40 mg SQ daily for 2 weeks, then a aspirin 325 mg daily for 14 more day.    WOUND CARE:  Keep the dressing clean and dry.  Light drainage may occur the first 2 days postop.  You may remove the dressings and get the incision wet in the shower 72 hours after surgery.  DO NOT remove steri-strips or sutures.  DO NOT immerse the incision under water.  Carefully pat the incision dry.  Replace dressing with gauze and tegederm dressing and keep covered until  If there is wound drainage, re-apply a fresh dry gauze dressing.  Please call our office ([phone]) if you experience either of the following:  Sudden increase in swelling, redness, or warmth at the surgical site  Excessive incisional drainage that persists beyond the 3rd day after surgery  Oral temperature greater than 101 degrees, not relieved with Tylenol  Shortness of breath, chest pain, nausea, or any other concerning symptoms  If it is after hours, go to the Emergency Room  "for evaluation    Other pain/swelling control measures:  Cold Therapy:  Apply ice (20 min on, 20 min off) as often as you feel is necessary. Ice helps with pain.   Elevation:  Elevate the entire leg above heart level.  Place pillows under your ankle to keep your knee straight. This will help with swelling and prevents stiffness   Compression:  Keep an ace wrap on the operative leg until you return to the office. It may be removed to shower as described above.     RANGE OF MOTION:  You are allowed FULL RANGE OF MOTION as tolerated.    IMMOBILIZATION:  None.  You are allowed full range of motion as tolerated.    ACTIVITY:   BEAR FULL WEIGHT AS TOLERATED on the operative leg.  Use crutches to assist only as needed.  Using Crutches on Stairs:  Going up, lead with your \"good\" (nonoperative) leg.  Going down, lead with your \"bad\" (operative) leg.  Use a hand rail when available.  Knee Extension:  Place a rolled towel or pillow under your ankle for 20-30 minutes 3-5 times per day.  This will help to maintain full knee extension.  Quad Sets:  Sit or lie with your knee straight.  Tighten your quadriceps (front thigh) muscle.  Hold for 3 seconds, then relax.  Repeat 20 times per hour while awake.    PHYSICAL THERAPY:  Begin therapy 5 TO 7 DAYS AFTER SURGERY.  You were given a prescription for therapy at your preoperative office visit.  If you do not have physical therapy scheduled yet, please call our office for assistance.    FOLLOW-UP APPOINTMENT:  2 weeks after surgery with:        Ehsan Hammond MD    [address], [address]  [place] [address]    [facility] [id number] [place] [id number]      Phone:   [phone]  "

## 2024-10-21 NOTE — TELEPHONE ENCOUNTER
----- Message from Cynthia MANJARREZ sent at 10/18/2024 10:29 AM EDT -----  Regarding: care gap request  10/18/24 10:29 AM    Hello, our patient attached above has had CRC: Colonoscopy completed/performed. Please assist in updating the patient chart bymak [facility] )  The date of service is 2024.     Thank you,  Cynthia MEDINA

## 2024-10-21 NOTE — ANESTHESIA POSTPROCEDURE EVALUATION
Post-Op Assessment Note            No anethesia notable event occurred.    Staff: Anesthesiologist           Last Filed PACU Vitals:  Vitals Value Taken Time   Temp 97.2 °F (36.2 °C) 10/21/24 1320   Pulse 62 10/21/24 1405   /80 10/21/24 1405   Resp 18 10/21/24 1405   SpO2 99 % 10/21/24 1405       Modified Collin:  Activity: 2 (10/21/2024  1:20 PM)  Respiration: 2 (10/21/2024  1:20 PM)  Circulation: 2 (10/21/2024  1:20 PM)  Consciousness: 2 (10/21/2024  1:20 PM)  Oxygen Saturation: 2 (10/21/2024  1:20 PM)  Modified Collin Score: 10 (10/21/2024  1:20 PM)

## 2024-10-21 NOTE — ANESTHESIA POSTPROCEDURE EVALUATION
Post-Op Assessment Note    CV Status:  Stable  Pain Score: 0    Pain management: adequate       Mental Status:  Awake   Hydration Status:  Stable   Airway Patency:  Patent  Two or more mitigation strategies used for obstructive sleep apnea   Post Op Vitals Reviewed: Yes    No anethesia notable event occurred.    Staff: CRNA           Last Filed PACU Vitals:  Vitals Value Taken Time   Temp     Pulse 75    /72    Resp 14    SpO2 99        Modified Collin:  No data recorded

## 2024-10-22 ENCOUNTER — TELEPHONE (OUTPATIENT)
Age: 54
End: 2024-10-22

## 2024-10-22 ENCOUNTER — HOSPITAL ENCOUNTER (EMERGENCY)
Facility: HOSPITAL | Age: 54
Discharge: HOME | End: 2024-10-22
Attending: EMERGENCY MEDICINE | Admitting: EMERGENCY MEDICINE
Payer: COMMERCIAL

## 2024-10-22 VITALS
OXYGEN SATURATION: 94 % | SYSTOLIC BLOOD PRESSURE: 110 MMHG | DIASTOLIC BLOOD PRESSURE: 59 MMHG | RESPIRATION RATE: 18 BRPM | TEMPERATURE: 97.5 F | HEART RATE: 72 BPM

## 2024-10-22 DIAGNOSIS — T78.40XA ALLERGIC REACTION TO DRUG, INITIAL ENCOUNTER: Primary | ICD-10-CM

## 2024-10-22 DIAGNOSIS — Z29.9 ENCOUNTER FOR DEEP VEIN THROMBOSIS (DVT) PROPHYLAXIS: ICD-10-CM

## 2024-10-22 PROCEDURE — 96375 TX/PRO/DX INJ NEW DRUG ADDON: CPT

## 2024-10-22 PROCEDURE — 99283 EMERGENCY DEPT VISIT LOW MDM: CPT

## 2024-10-22 PROCEDURE — 96374 THER/PROPH/DIAG INJ IV PUSH: CPT

## 2024-10-22 PROCEDURE — 99284 EMERGENCY DEPT VISIT MOD MDM: CPT | Performed by: EMERGENCY MEDICINE

## 2024-10-22 RX ORDER — DIPHENHYDRAMINE HYDROCHLORIDE 50 MG/ML
50 INJECTION INTRAMUSCULAR; INTRAVENOUS ONCE
Status: COMPLETED | OUTPATIENT
Start: 2024-10-22 | End: 2024-10-22

## 2024-10-22 RX ORDER — FAMOTIDINE 20 MG/1
20 TABLET, FILM COATED ORAL 2 TIMES DAILY
Qty: 14 TABLET | Refills: 0 | Status: SHIPPED | OUTPATIENT
Start: 2024-10-22 | End: 2024-10-29

## 2024-10-22 RX ORDER — DIPHENHYDRAMINE HCL 50 MG
50 CAPSULE ORAL EVERY 6 HOURS PRN
Qty: 30 CAPSULE | Refills: 0 | Status: SHIPPED | OUTPATIENT
Start: 2024-10-22

## 2024-10-22 RX ORDER — ASPIRIN 81 MG/1
81 TABLET, CHEWABLE ORAL 2 TIMES DAILY
Qty: 28 TABLET | Refills: 0 | Status: SHIPPED | OUTPATIENT
Start: 2024-10-22 | End: 2024-11-05

## 2024-10-22 RX ORDER — CETIRIZINE HYDROCHLORIDE 10 MG/1
10 TABLET ORAL DAILY
Qty: 7 TABLET | Refills: 0 | Status: SHIPPED | OUTPATIENT
Start: 2024-10-22 | End: 2024-10-29

## 2024-10-22 RX ORDER — FAMOTIDINE 10 MG/ML
40 INJECTION, SOLUTION INTRAVENOUS ONCE
Status: COMPLETED | OUTPATIENT
Start: 2024-10-22 | End: 2024-10-22

## 2024-10-22 RX ORDER — METHYLPREDNISOLONE SODIUM SUCCINATE 125 MG/2ML
60 INJECTION, POWDER, LYOPHILIZED, FOR SOLUTION INTRAMUSCULAR; INTRAVENOUS ONCE
Status: COMPLETED | OUTPATIENT
Start: 2024-10-22 | End: 2024-10-22

## 2024-10-22 RX ORDER — PREDNISONE 50 MG/1
50 TABLET ORAL DAILY
Qty: 5 TABLET | Refills: 0 | Status: SHIPPED | OUTPATIENT
Start: 2024-10-22 | End: 2024-10-26

## 2024-10-22 RX ORDER — LORATADINE 10 MG/1
10 TABLET ORAL ONCE
Status: COMPLETED | OUTPATIENT
Start: 2024-10-22 | End: 2024-10-22

## 2024-10-22 RX ADMIN — DIPHENHYDRAMINE HYDROCHLORIDE 50 MG: 50 INJECTION, SOLUTION INTRAMUSCULAR; INTRAVENOUS at 18:56

## 2024-10-22 RX ADMIN — METHYLPREDNISOLONE SODIUM SUCCINATE 60 MG: 125 INJECTION, POWDER, FOR SOLUTION INTRAMUSCULAR; INTRAVENOUS at 18:56

## 2024-10-22 RX ADMIN — FAMOTIDINE 40 MG: 10 INJECTION, SOLUTION INTRAVENOUS at 18:56

## 2024-10-22 RX ADMIN — LORATADINE 10 MG: 10 TABLET ORAL at 18:55

## 2024-10-22 NOTE — TELEPHONE ENCOUNTER
Called patient and verified [employer]  as insurance for medical coverage. She says prescription plan is pharmaceutical benefits. She will upload card to my chart.

## 2024-10-22 NOTE — TELEPHONE ENCOUNTER
Patient called back to request assistance with uploading the prescription plan card through [employer].  She will be sending the images of the card as a [employer] message, but it appeared that it may have come through her , Paul Valentino's account (: 1971).  She provided the information as well in case the message does not come through.    Prescription Plan:  [address]  [employer]  [phone]  RxBIN: [id number]  Group: [id number]  Member ID: [id number]

## 2024-10-22 NOTE — TELEPHONE ENCOUNTER
Called pt's prescription plan, [employer] at  [phone], per the rep I spoke with, the plan does not cover any weight loss medications.

## 2024-10-22 NOTE — TELEPHONE ENCOUNTER
Caller: Boyd     Doctor: Markel    Reason for call: patient had Knee arthroscopy yesterday with Dr Jean Baptiste.  gave his wife her first injection of enoxaparin (LOVENOX) 40 mg/0.4 mL. About an hour and a half afterwards the patient started to experiencing feeling hot and flush, sore throat, chest is hurting. On call  contacted      Call back#: [phone]

## 2024-10-23 NOTE — ED PROVIDER NOTES
Time reflects when diagnosis was documented in both MDM as applicable and the Disposition within this note       Time User Action Codes Description Comment    10/22/2024  8:46 PM Oh Bush Add [T78.40XA] Allergic reaction to drug, initial encounter     10/22/2024  8:47 PM Oh Bush Add [Z29.9] Encounter for deep vein thrombosis (DVT) prophylaxis           ED Disposition       ED Disposition   Discharge    Condition   Stable    Date/Time   Tue Oct 22, 2024  8:46 PM    Comment   Rere Espinoo discharge to home/self care.                   Assessment & Plan       Medical Decision Making  Give steroids, antihistamines and continue to monitor patient for any worsening symptoms    Patient felt better after medications given.  Rash improved.  Patient no longer had tingling sensation in her throat.  At this time patient symptoms are consistent with allergic reaction that is most likely secondary to Lovenox.  Case discussed with patient's orthopedic team who recommended discontinuing Lovenox and starting 81 mg aspirin twice a day for 2 weeks.  Patient given prescription for aspirin and discharged with follow-up to orthopedic surgery as well as PCP.  Patient also given steroid burst and antihistamines.  Close return instructions given to return to the ER for any worsening symptoms.  Patient agrees with discharge plan.  Patient well appearing at time of discharge.    Please Note: Fluency Direct voice recognition software may have been used in the creation of this document. Wrong words or sound a like substitutions may have occurred due to the inherent limitations of the voice software.         Risk  OTC drugs.  Prescription drug management.        ED Course as of 10/23/24 0051   Tue Oct 22, 2024   2037 Patient reexamined at bedside.  Patient is feeling much better.   2043 Case discussed with orthopedic surgeon on-call, Dr. Hand who recommended switching patient from Lovenox to aspirin 81 mg twice daily.        Medications   methylPREDNISolone sodium succinate (Solu-MEDROL) injection 60 mg (60 mg Intravenous Given 10/22/24 1856)   diphenhydrAMINE (BENADRYL) injection 50 mg (50 mg Intravenous Given 10/22/24 1856)   Famotidine (PF) (PEPCID) injection 40 mg (40 mg Intravenous Given 10/22/24 1856)   loratadine (CLARITIN) tablet 10 mg (10 mg Oral Given 10/22/24 1855)       ED Risk Strat Scores                           SBIRT 20yo+      Flowsheet Row Most Recent Value   Initial Alcohol Screen: US AUDIT-C     1. How often do you have a drink containing alcohol? 0 Filed at: 10/22/2024 1838   2. How many drinks containing alcohol do you have on a typical day you are drinking?  0 Filed at: 10/22/2024 1838   3a. Male UNDER 65: How often do you have five or more drinks on one occasion? 0 Filed at: 10/22/2024 1838   3b. FEMALE Any Age, or MALE 65+: How often do you have 4 or more drinks on one occassion? 0 Filed at: 10/22/2024 1838   Audit-C Score 0 Filed at: 10/22/2024 1838   SPENCER: How many times in the past year have you...    Used an illegal drug or used a prescription medication for non-medical reasons? Never Filed at: 10/22/2024 1838                            History of Present Illness       Chief Complaint   Patient presents with    Allergic Reaction     States she had a meniscus tear repair yesterday and took the first shot of lovenox about 4pm and has been having redness in face and itching in ears and throat feels funny       Past Medical History:   Diagnosis Date    Anxiety     Arthritis     DVT (deep venous thrombosis) ([facility])     GERD (gastroesophageal reflux disease)     Osteoarthritis 8/23    Patient denies medical problems     PE (pulmonary thromboembolism) ([facility]) 2023    6 mos eliquis      Past Surgical History:   Procedure Laterality Date    CHOLECYSTECTOMY      HYSTERECTOMY N/A 06/06/2023    WI ARTHROSCOPY KNEE W/MENISCUS RPR MEDIAL/LATERAL Right 10/21/2024    Procedure: partial medial meiscectomy and chonroplasty  medial femoral condyle;  Surgeon: Ehsan Hammond MD;  Location: [facility] OR;  Service: Orthopedics    TONSILLECTOMY        Family History   Adopted: Yes   Family history unknown: Yes      Social History     Tobacco Use    Smoking status: Former     Current packs/day: 0.00     Average packs/day: 0.3 packs/day for 10.0 years (2.5 ttl pk-yrs)     Types: Cigarettes     Start date: 2012     Quit date: 2022     Years since quittin.8    Smokeless tobacco: Never    Tobacco comments:     1 pack a week   Vaping Use    Vaping status: Never Used   Substance Use Topics    Alcohol use: Yes     Alcohol/week: 9.0 standard drinks of alcohol     Types: 6 Cans of beer, 3 Standard drinks or equivalent per week     Comment: Weekends    Drug use: Yes     Frequency: 2.0 times per week     Types: Marijuana     Comment: Recreationally and to fall asleep      E-Cigarette/Vaping    E-Cigarette Use Never User       E-Cigarette/Vaping Substances    Nicotine No     THC No     CBD No     Flavoring No     Other No     Unknown No       I have reviewed and agree with the history as documented.     53-year-old female presents to the ED for evaluation of possible allergic reaction.  Patient had medial meniscal tear repair to right knee yesterday by orthopedic surgeon.  Patient was given prescription for Lovenox to take daily for 2 weeks.  Patient administered her first dose of Lovenox at home.  An hour or 2 after administration of Lovenox, patient started to have rash to her face, neck, anterior chest.  Patient also noted some tingling sensation in her throat.  Subsequently patient came to the ED for further evaluation.      Allergic Reaction  Presenting symptoms: rash        Review of Systems   Constitutional:  Negative for chills and fever.   HENT:  Negative for ear pain and sore throat.    Eyes:  Negative for pain and visual disturbance.   Respiratory:  Negative for cough and shortness of breath.    Cardiovascular:  Negative for  chest pain and palpitations.   Gastrointestinal:  Negative for abdominal pain and vomiting.   Genitourinary:  Negative for dysuria and hematuria.   Musculoskeletal:  Negative for arthralgias and back pain.   Skin:  Positive for rash. Negative for color change.   Neurological:  Negative for seizures and syncope.   All other systems reviewed and are negative.          Objective       ED Triage Vitals   Temperature Pulse Blood Pressure Respirations SpO2 Patient Position - Orthostatic VS   10/22/24 1838 10/22/24 1840 10/22/24 1840 10/22/24 1838 10/22/24 1840 10/22/24 2059   (!) 97.3 °F (36.3 °C) 72 127/79 18 96 % Lying      Temp src Heart Rate Source BP Location FiO2 (%) Pain Score    -- 10/22/24 2059 10/22/24 2059 -- --     Monitor Right arm        Vitals      Date and Time Temp Pulse SpO2 Resp BP Pain Score FACES Pain Rating User   10/22/24 2059 97.5 °F (36.4 °C) 72 94 % 18 110/59 -- -- AM   10/22/24 1840 -- 72 96 % 20 127/79 -- -- LAMBERT   10/22/24 1838 97.3 °F (36.3 °C) -- -- 18 -- -- -- LAMBERT            Physical Exam  Vitals and nursing note reviewed.   Constitutional:       General: She is not in acute distress.     Appearance: She is well-developed.   HENT:      Head: Normocephalic and atraumatic.      Mouth/Throat:      Comments: Oropharynx unremarkable.  No edema noted.  No signs of airway compromise noted.  Eyes:      Conjunctiva/sclera: Conjunctivae normal.   Cardiovascular:      Rate and Rhythm: Normal rate and regular rhythm.      Heart sounds: No murmur heard.  Pulmonary:      Effort: Pulmonary effort is normal. No respiratory distress.      Breath sounds: Normal breath sounds.      Comments: Lungs are clear to auscultation bilateral.  Abdominal:      Palpations: Abdomen is soft.      Tenderness: There is no abdominal tenderness.   Musculoskeletal:         General: No swelling.      Cervical back: Neck supple.   Skin:     General: Skin is warm and dry.      Capillary Refill: Capillary refill takes less than 2  seconds.      Comments: Erythematous, maculopapular rash noted to bilateral cheeks of face as well as anterior neck and anterior chest wall.   Neurological:      Mental Status: She is alert.   Psychiatric:         Mood and Affect: Mood normal.         Results Reviewed       None            No orders to display       Procedures    ED Medication and Procedure Management   Prior to Admission Medications   Prescriptions Last Dose Informant Patient Reported? Taking?   Iron-Vitamin C (IRON 100/C PO)  Self Yes No   Semaglutide-Weight Management (Wegovy) 0.25 MG/0.5ML   No No   Sig: Inject 0.25 mg under the skin weekly   Turmeric, Curcuma Longa, (Turmeric Root) POWD  Self Yes No   acetaminophen (TYLENOL) 500 mg tablet   No No   Sig: Take 2 tablets (1,000 mg total) by mouth every 8 (eight) hours for 7 days   clonazePAM (KlonoPIN) 0.5 mg tablet  Self Yes No   Sig: Take 1 tablet by mouth 2 (two) times a day as needed   enoxaparin (LOVENOX) 40 mg/0.4 mL   No No   Sig: Inject 0.4 mL (40 mg total) under the skin in the morning for 14 days   naproxen (Naprosyn) 500 mg tablet   No No   Sig: Take 1 tablet (500 mg total) by mouth 2 (two) times a day with meals for 14 days   omeprazole (PriLOSEC) 20 mg delayed release capsule   Yes No   Sig: TAKE 1 CAPSULE BY MOUTH ONCE DAILY BEFORE A MEAL   ondansetron (ZOFRAN) 4 mg tablet   No No   Sig: Take 1 tablet (4 mg total) by mouth every 8 (eight) hours as needed for nausea or vomiting   oxyCODONE (Roxicodone) 5 immediate release tablet   No No   Sig: Take 1 tablet (5 mg total) by mouth every 4 (four) hours as needed for moderate pain for up to 15 doses Max Daily Amount: 30 mg      Facility-Administered Medications: None     Discharge Medication List as of 10/22/2024  8:49 PM        START taking these medications    Details   aspirin 81 mg chewable tablet Chew 1 tablet (81 mg total) 2 (two) times a day for 14 days, Starting Tue 10/22/2024, Until Tue 11/5/2024, Normal      cetirizine (ZyrTEC)  10 mg tablet Take 1 tablet (10 mg total) by mouth daily for 7 days, Starting Tue 10/22/2024, Until Tue 10/29/2024, Normal      diphenhydrAMINE (BENADRYL) 50 mg capsule Take 1 capsule (50 mg total) by mouth every 6 (six) hours as needed for itching, Starting Tue 10/22/2024, Normal      famotidine (PEPCID) 20 mg tablet Take 1 tablet (20 mg total) by mouth 2 (two) times a day for 7 days, Starting Tue 10/22/2024, Until Tue 10/29/2024, Normal      predniSONE 50 mg tablet Take 1 tablet (50 mg total) by mouth daily for 4 days, Starting Tue 10/22/2024, Until Sat 10/26/2024, Normal           CONTINUE these medications which have NOT CHANGED    Details   acetaminophen (TYLENOL) 500 mg tablet Take 2 tablets (1,000 mg total) by mouth every 8 (eight) hours for 7 days, Starting Mon 10/21/2024, Until Mon 10/28/2024, Normal      clonazePAM (KlonoPIN) 0.5 mg tablet Take 1 tablet by mouth 2 (two) times a day as needed, Starting Tue 5/16/2023, Historical Med      Iron-Vitamin C (IRON 100/C PO) Historical Med      naproxen (Naprosyn) 500 mg tablet Take 1 tablet (500 mg total) by mouth 2 (two) times a day with meals for 14 days, Starting Mon 10/21/2024, Until Mon 11/4/2024, Normal      omeprazole (PriLOSEC) 20 mg delayed release capsule TAKE 1 CAPSULE BY MOUTH ONCE DAILY BEFORE A MEAL, Historical Med      ondansetron (ZOFRAN) 4 mg tablet Take 1 tablet (4 mg total) by mouth every 8 (eight) hours as needed for nausea or vomiting, Starting Mon 10/21/2024, Normal      oxyCODONE (Roxicodone) 5 immediate release tablet Take 1 tablet (5 mg total) by mouth every 4 (four) hours as needed for moderate pain for up to 15 doses Max Daily Amount: 30 mg, Starting Mon 10/21/2024, Normal      Semaglutide-Weight Management (Wegovy) 0.25 MG/0.5ML Inject 0.25 mg under the skin weekly, Normal      Turmeric, Curcuma Longa, (Turmeric Root) POWD Historical Med           STOP taking these medications       enoxaparin (LOVENOX) 40 mg/0.4 mL Comments:   Reason  for Stopping:             No discharge procedures on file.  ED SEPSIS DOCUMENTATION   Time reflects when diagnosis was documented in both MDM as applicable and the Disposition within this note       Time User Action Codes Description Comment    10/22/2024  8:46 PM Oh Bush [T78.40XA] Allergic reaction to drug, initial encounter     10/22/2024  8:47 PM Oh Bush [Z29.9] Encounter for deep vein thrombosis (DVT) prophylaxis                  Oh Bush DO  10/23/24 0051

## 2024-10-23 NOTE — OP NOTE
OPERATIVE REPORT  PATIENT NAME: Barbara A Valentino    :  1970  MRN: [id number]  Pt Location: [facility] OR ROOM 04    SURGERY DATE: 10/21/2024    Surgeons and Role:     * Ehsan Hammond MD - Primary     * Piyush Capone PA-C - Assisting    Preop Diagnosis:  Complex tear of medial meniscus of right knee as current injury, initial encounter [S83.231A]    Post-Op Diagnosis Codes:     * Complex tear of medial meniscus of right knee as current injury, initial encounter [S83.231A]    Procedure(s):  Right knee arthroscopic partial medial meniscectomy  Right knee arthroscopic chondroplasty medial femoral condyle    Specimen(s):  * No specimens in log *    Estimated Blood Loss:   Minimal    Drains:  * No LDAs found *    Anesthesia Type:   General w/ Regional    Operative Indications:  Complex tear of medial meniscus of right knee as current injury, initial encounter [S83.231A]  53-year-old female who presented after traumatic knee injury with medial sided joint.  MRI was performed which revealed a medial meniscal tear. We had a lengthy discussion regarding operative vs non-operative treatment. Non-operative treatment options would include physical therapy, corticosteroid injections, activity modification, and anti-inflammatories. Operative intervention would be a knee arthroscopy with medial meniscal repair vs meniscectomy.   The patient is quite active and would like to get back to all activity, and thus would like to proceed with arthroscopic medial meniscal repair vs meniscectomy.   We discussed with the patient the risks of no treatment, non-operative treatment, and operative treatment. The risks of operative intervention were discussed and include but are not limited to: Infection, bleeding, stiffness, loss of range of motion, blood clot, failure of surgery, fracture, delayed union, nonunion, malunion, risk of potential future arthritis, continued problems with swelling, injury to surrounding  structures/nerve/artery/vein, recurrence of cysts, failure of hardware, retained hardware and/or foreign body, symptomatic hardware, and continued instability, pain, dysfunction, or disability despite repair.     Operative Findings:  Degenerative disease of the medial femoral condyle with grade 2 and 3 changes.  Posterior horn medial meniscal tear with flap tear flipped into the compartment and medial gutter posteriorly.  No definitive medial root tear      Complications:   None    Procedure and Technique:       Findings:      Arthroscopic evaluation of the right knee revealed the following:     Medial meniscus: Complex posterior horn medial meniscal tear with flap flipped into the medial compartment and gutter posteriorly.  No definitive meniscal root tear.  Poor quality tissue in the tear.  Medial femoral condyle: Grade 2 and 3 changes at the central weightbearing surface of the medial femoral condyle no significant full-thickness cartilage lesion.  Medial tibial plateau: Normal  Anterior cruciate ligament: Normal appearance.  Posterior cruciate ligament: Normal appearance.   Lateral meniscus: Normal  Lateral femoral condyle: Normal  Lateral tibial plateau: Normal  Medial and lateral gutters: No loose bodies.   Patella: Mild fraying of the central patella  Trochlea: Mild fraying of the central trochlea  Medial plica: No thickened plica present       Procedure:  In the pre-operative holding area, the patient identified the correct operative extremity and I marked that extremity with my initials. The patient was then brought to the operating room and positioned supine. Following satisfactory induction of anesthesia, the right knee was prepped and draped in the usual sterile fashion for surgical arthroscopy of the right knee.  2 g of Ancef were given preoperatively.  Before any surgical instrumentation was passed to me by the surgical technician, a formalized time-out occurred, which involves the surgeon, circulating  nurse, and anesthesia staff all verifying the correct operative extremity. My initials were visible on the prepped and draped operative field.      The anatomic landmarks of the anteromedial and anterolateral portals were marked and these portal sites were injected with 0.5% ropivacaine. The anterolateral portal was established with a #11 blade. The arthroscope was introduced through this portal. Under direct visualization, the anteromedial portal was established with a localizing needle followed by a scalpel. A probe was then introduced into the anteromedial portal. A systematic diagnostic arthroscopy evaluated the following:  medial compartment, notch, lateral compartment, patellofemoral compartment, medial gutter, and lateral gutter.      The medial meniscal tear was found to be complex in nature with poor quality tissue and the tear fragment.  There was a fragment which was flipped into the medial compartment.  There is also a portion flipped into the gutter posterior medially.  No definitive meniscal root tear stable to probing.  We first treated the meniscal tear with a partial meniscectomy given the poor quality tissue and low healing rate.  This was performed with arthroscopic shaver and biter.  Following partial meniscectomy there is no unstable portion left.  After probing of the meniscus it was appear to be stable.  She had about 60% of the posterior horn still left.  We then turned our attention to the medial femoral condyle.  There was a area of unstable flap of cartilage.  This was debrided using a arthroscopic shaver.  There is grade 2 and 3 changes on the central weightbearing surface of the medial femoral condyle.  After chondroplasty was informed there is no further unstable portions of cartilage.     There was no additional pathology. All particulate debris was removed. The knee was copiously rinsed and then drained. The portals were closed with an interrupted 3-0 nylon suture.  0.5% ropivacaine  was placed into the joint.  The skin was cleansed with sterile saline and dried before Steri-Strips were applied. Finally, a sterile dressing was secured by Webril and an Ace wrap, followed by a hinged knee brace locked in extension.           I was present for the entire procedure., A qualified resident physician was not available., and A physician assistant was required during the procedure for retraction, tissue handling, dissection and suturing.    Patient Disposition:  PACU              SIGNATURE: Ehsan Hammond MD  DATE: October 22, 2024  TIME: 9:50 PM      The patient may weight-bear as tolerated.  She will start PT in the next 3 to 5 days.  She placed on Lovenox daily for DVT prophylaxis and given history of pulmonary embolism in the past.  She will also use early mobilization which was discussed with her preoperatively to help decrease this risk.  In physical therapy she can start range of motion and strengthening exercises immediately.

## 2024-10-23 NOTE — DISCHARGE INSTRUCTIONS
Please stop taking your Lovenox and take baby aspirin twice a day for 2 weeks.  Please take a list of all of your medications and discharge paperwork with you to all of your follow-up medical visits. Please take all of your medications as directed. Please call your family doctor or return to the ER if you have increased shortness of breath, chest pain, fevers, chills, nausea, vomiting, diarrhea, or any other worsening symptoms.

## 2024-10-24 ENCOUNTER — EVALUATION (OUTPATIENT)
Dept: PHYSICAL THERAPY | Facility: CLINIC | Age: 54
End: 2024-10-24
Payer: COMMERCIAL

## 2024-10-24 DIAGNOSIS — Z87.828 S/P ARTHROSCOPIC PARTIAL MEDIAL MENISCECTOMY OF RIGHT KNEE: ICD-10-CM

## 2024-10-24 DIAGNOSIS — M25.561 ACUTE PAIN OF RIGHT KNEE: Primary | ICD-10-CM

## 2024-10-24 DIAGNOSIS — Z98.890 S/P ARTHROSCOPIC PARTIAL MEDIAL MENISCECTOMY OF RIGHT KNEE: ICD-10-CM

## 2024-10-24 PROCEDURE — 97161 PT EVAL LOW COMPLEX 20 MIN: CPT

## 2024-10-24 NOTE — PROGRESS NOTES
PT Evaluation     Today's date: 10/24/2024  Patient name: Barbara A Valentino  : 1970  MRN: [id number]  Referring provider: Ehsan Hammond MD  Dx:   Encounter Diagnosis     ICD-10-CM    1. Acute pain of right knee  M25.561       2. S/P arthroscopic partial medial meniscectomy of right knee  Z98.890     Z87.828           Start Time: 1350  Stop Time: 1435  Total time in clinic (min): 45 minutes    Assessment  Impairments: abnormal gait, abnormal muscle firing, abnormal muscle tone, abnormal or restricted ROM, abnormal movement, activity intolerance, impaired physical strength, lacks appropriate home exercise program and pain with function  Symptom irritability: low    Assessment details: Pt is a pleasant 53 y.o. adult who presents to [facility] PT 3 days s/p R partial medial menisectomy. Today, she presents with weakness, decreased ROM, impaired balance, decreased coordination, new onset of impairment of functional mobility, decreased activity tolerance, and decreased strength. Functionally, she is limited in her ability to stand and ambulate, negotiate stairs, perform age appropriate recreation and exercise, and sleep through the night. She is motivated to improve. Pt will benefit from skilled PT to address the aforementioned deficits and limitations in an effort to maximize pain free functional mobility and overall quality of life. Progress as able with these goals in mind.       Understanding of Dx/Px/POC: good     Prognosis: good    Goals  Short term goals (3 weeks):  1) Pt will improve R ROM to WNL pain free.  2) Pt will improve B LE and core strength deficits by 1/3 grade MMT.   3) Pt will reports pain at worse <3/10.  4) Pt will initiate and progress HEP w/ special emphasis on functional quad/hip control and WB capacity.     Long term goals (6 weeks)  1) Pt will improve FOTO to at least 75.  2) Pt will stand and ambulate community distances w/o deficit related to R knee.  3) Pt will be  functionally unlimited w/ stairs, step over step w/o UE support.   4) Pt will be independent and compliant w/ HEP in order to maximize functional benefit of skilled PT following d/c.       Plan  Patient would benefit from: skilled PT  Planned modality interventions: cryotherapy and thermotherapy: hydrocollator packs    Planned therapy interventions: abdominal trunk stabilization, activity modification, joint mobilization, manual therapy, massage, motor coordination training, neuromuscular re-education, patient education, postural training, stretching, strengthening, therapeutic activities, therapeutic exercise, therapeutic training, transfer training, home exercise program, gait training, graded activity, functional ROM exercises, graded exercise, flexibility, body mechanics training, balance and balance/weight bearing training    Frequency: 2x week  Duration in weeks: 6  Treatment plan discussed with: patient  Plan details: HEP to start: see code         Subjective Evaluation    History of Present Illness  Date of surgery: 10/21/2024  Mechanism of injury: Pt has had R knee pain/arthritis pain for years. Was doing a MAX challenge, turned R knee the wrong way. Initial injury happened in 2024. Opted for surgery to remove part of medial meniscus. Performed on 10/21/24. Pt has taken oxy occasionally since surgery (prescribed). Notes that sleeping is very difficult. Arrives w/ one crutch today but feels pretty good overall. Notes that her house has a lot of stairs, these are difficult right now as well. Functional status below:   Quality of life: good    Patient Goals  Patient goals for therapy: increased strength, decreased pain, return to sport/leisure activities and increased motion  Patient goal: get back to normal, get back to exercising, be able to move without pain  Pain  Current pain ratin  At best pain ratin  At worst pain rating: 3  Location: R anterior knee  Quality: dull ache  Relieving  "factors: rest, medications and ice (oxy (prescribed), elevation)  Aggravating factors: stair climbing, standing, walking, running and lifting  Progression: improved    Social Support  Steps to enter house: yes  Stairs in house: yes   Lives in: multiple-level home  Lives with: spouse    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[employer], half days)        Objective     Observations     Additional Observation Details  Incisions well healing w/o signs of infection    Mid calf 37 cm B    Morrison DVT score: 1 - prior DVT only positive factor    Palpation     Additional Palpation Details  Min TTP about R medial knee, along TF joint line    Neurological Testing     Sensation     Knee   Left Knee   Intact: Light touch    Right Knee   Intact: light touch     Active Range of Motion   Left Knee   Normal active range of motion    Right Knee   Flexion: 105 degrees   Extension: 2 degrees     Passive Range of Motion   Left Knee   Normal passive range of motion    Right Knee   Flexion: 115 degrees   Extension: 1 degrees     Strength/Myotome Testing     Left Knee   Flexion: 4  Extension: 4    Right Knee   Flexion: 4-  Extension: 3+    Additional Strength Details  LE Strength (R/L)    Hip  Flex 4/5 , 4/5  Ext 4/5 , 4/5  Abd 4/5 , 4/5  Add 4/5 , 4/5    Core Strength: 1+/5     *Indicates pain      Tests     Additional Tests Details  Deferred s/t knowledge of diagnosis       Ambulation     Ambulation: Level Surfaces     Additional Level Surfaces Ambulation Details  Min decreased WB and step length on R LE, min decreased knee ext at terminal stance, not antalgic     Functional Assessment        Forward Step Up 6\"     Right Leg  Increased forward trunk lean and increased contralateral push off.     General Comments:      Knee Comments  Sit<>stand: UE support on LE w/ min L WS from level chair height     BW squat: TBA     Stairs: 6\" w/ forward trunk lean and fair glute drive    SLS: pain on R       Flowsheet Rows      Flowsheet Row Most Recent Value " "  PT/OT G-Codes    Current Score 47   Projected Score 75   FOTO information reviewed Yes               Precautions: DOS 10/21/24     POC expires Auth Status Total   Visits  Start date  Expiration date PT/OT + Visit Limit? Co-Insurance   CMS - [place] req     No  $25 co-pay                                             Visit/Unit Tracking  AUTH Status:  Date               Visits  Authed: Used                Remaining                    Date (Visit #) IE 10/24 (1) (2) (3) (4) (5)   Manual        DTM and TPR                                Exercise Diary         Ther Ex        Active w/u             PROM  x2-3 mins CP           HS/glute/piri stretch  tested           QS, SLR, hip abd  QS, s/l hip abd, SLR x10           Active mobility                                                Neuro Re Ed        Bridging  x10           TrA progressions             Squat training  sit<>stand from airex x10-15           Hip Abd Progressions             Balance  6\" step up x10 total        Gait on turf x 3-4 mins       HEP and POC review  X5 mins                                       Ther Act             stairs             gait             Sit<>stand                                          Modalities              heat               Ice                                             IE 10/24/24:  Access Code: [email or web address]  URL: [email or web address]  Date: 10/24/2024  Prepared by: Celestino Concepcion    Exercises  - Supine Bridge  - 1 x daily - 7 x weekly - 3 sets - 10 reps  - Supine Active Straight Leg Raise  - 1 x daily - 7 x weekly - 3 sets - 10 reps  - Seated Long Arc Quad  - 1 x daily - 7 x weekly - 3 sets - 10 reps  - Sit to Stand Without Arm Support  - 1 x daily - 7 x weekly - 3 sets - 10 reps  - Step Up  - 1 x daily - 7 x weekly - 3 sets - 10 reps         "

## 2024-10-25 ENCOUNTER — APPOINTMENT (EMERGENCY)
Dept: RADIOLOGY | Facility: HOSPITAL | Age: 54
End: 2024-10-25
Payer: COMMERCIAL

## 2024-10-25 ENCOUNTER — HOSPITAL ENCOUNTER (EMERGENCY)
Facility: HOSPITAL | Age: 54
Discharge: HOME | End: 2024-10-25
Attending: EMERGENCY MEDICINE
Payer: COMMERCIAL

## 2024-10-25 ENCOUNTER — TELEPHONE (OUTPATIENT)
Age: 54
End: 2024-10-25

## 2024-10-25 VITALS
SYSTOLIC BLOOD PRESSURE: 181 MMHG | RESPIRATION RATE: 18 BRPM | BODY MASS INDEX: 28.93 KG/M2 | WEIGHT: 180 LBS | TEMPERATURE: 97.4 F | DIASTOLIC BLOOD PRESSURE: 102 MMHG | HEIGHT: 66 IN | HEART RATE: 70 BPM | OXYGEN SATURATION: 96 %

## 2024-10-25 DIAGNOSIS — M79.604 RIGHT LEG PAIN: Primary | ICD-10-CM

## 2024-10-25 DIAGNOSIS — Z86.718 HISTORY OF DVT (DEEP VEIN THROMBOSIS): ICD-10-CM

## 2024-10-25 PROCEDURE — 99284 EMERGENCY DEPT VISIT MOD MDM: CPT

## 2024-10-25 PROCEDURE — 93971 EXTREMITY STUDY: CPT | Performed by: SURGERY

## 2024-10-25 PROCEDURE — 93971 EXTREMITY STUDY: CPT

## 2024-10-25 PROCEDURE — 99284 EMERGENCY DEPT VISIT MOD MDM: CPT | Performed by: EMERGENCY MEDICINE

## 2024-10-25 RX ADMIN — APIXABAN 5 MG: 5 TABLET, FILM COATED ORAL at 10:12

## 2024-10-25 NOTE — ED PROVIDER NOTES
Time reflects when diagnosis was documented in both MDM as applicable and the Disposition within this note       Time User Action Codes Description Comment    10/25/2024 10:04 AM Rojas Juan WHITTAKER Add [M79.604] Right leg pain     10/25/2024 10:05 AM RojasJuan whittaker Add [Z86.718] History of DVT (deep vein thrombosis)           ED Disposition       ED Disposition   Discharge    Condition   Stable    Date/Time   Fri Oct 25, 2024 10:04 AM    Comment   Barbara A Valentino discharge to home/self care.                   Assessment & Plan       Medical Decision Making  Postoperative calf pain in a patient with a history of PE.  Patient allergic reaction to Lovenox and therefore is only been on aspirin.  Duplex study for possible DVT    Risk  Prescription drug management.             Medications   apixaban (ELIQUIS) tablet 5 mg (has no administration in time range)       ED Risk Strat Scores                           SBIRT 20yo+      Flowsheet Row Most Recent Value   Initial Alcohol Screen: US AUDIT-C     1. How often do you have a drink containing alcohol? 0 Filed at: 10/25/2024 0907   2. How many drinks containing alcohol do you have on a typical day you are drinking?  0 Filed at: 10/25/2024 0907   3a. Male UNDER 65: How often do you have five or more drinks on one occasion? 0 Filed at: 10/25/2024 0907   3b. FEMALE Any Age, or MALE 65+: How often do you have 4 or more drinks on one occassion? 0 Filed at: 10/25/2024 0907   Audit-C Score 0 Filed at: 10/25/2024 0907   SPENCER: How many times in the past year have you...    Used an illegal drug or used a prescription medication for non-medical reasons? Never Filed at: 10/25/2024 0907                            History of Present Illness       Chief Complaint   Patient presents with    Leg Pain     Right calf pain since yesterday. Recent right meniscus repair and was here on Tuesday for allergic reaction to lovenox shot       Past Medical History:   Diagnosis Date    Anxiety      Arthritis     DVT (deep venous thrombosis) ([facility])     GERD (gastroesophageal reflux disease)     Osteoarthritis     Patient denies medical problems     PE (pulmonary thromboembolism) ([facility])     6 mos eliquis      Past Surgical History:   Procedure Laterality Date    CHOLECYSTECTOMY      HYSTERECTOMY N/A 2023    [place] ARTHROSCOPY KNEE W/MENISCUS RPR MEDIAL/LATERAL Right 10/21/2024    Procedure: partial medial meiscectomy and chonroplasty medial femoral condyle;  Surgeon: Ehsan Hammond MD;  Location: [facility];  Service: Orthopedics    TONSILLECTOMY        Family History   Adopted: Yes   Family history unknown: Yes      Social History     Tobacco Use    Smoking status: Former     Current packs/day: 0.00     Average packs/day: 0.3 packs/day for 10.0 years (2.5 ttl pk-yrs)     Types: Cigarettes     Start date: 2012     Quit date: 2022     Years since quittin.8    Smokeless tobacco: Never    Tobacco comments:     1 pack a week   Vaping Use    Vaping status: Never Used   Substance Use Topics    Alcohol use: Yes     Alcohol/week: 9.0 standard drinks of alcohol     Types: 6 Cans of beer, 3 Standard drinks or equivalent per week     Comment: Weekends    Drug use: Yes     Frequency: 2.0 times per week     Types: Marijuana     Comment: Recreationally and to fall asleep      E-Cigarette/Vaping    E-Cigarette Use Never User       E-Cigarette/Vaping Substances    Nicotine No     THC No     CBD No     Flavoring No     Other No     Unknown No       I have reviewed and agree with the history as documented.     Patient status post meniscectomy on the right knee on Monday of this week 5 days prior to arrival.  She was seen postop day 2 with anaphylactoid type reaction to Lovenox.  Patient has been maintained on aspirin therapy.  She noticed pain in the posterior calf region associated with some swelling of the lower extremity since yesterday.  She has a history of DVT and PE in the past and was treated  "with Eliquis for 6 months.  No pleuritic chest pain present or shortness of breath.  Patient has complaints of a \"raspy throat\" which is causing her anxiety        Review of Systems   Constitutional:  Negative for chills and fever.   HENT:  Negative for congestion and sore throat.    Eyes:  Negative for visual disturbance.   Respiratory:  Negative for cough and shortness of breath.    Cardiovascular:  Positive for leg swelling. Negative for chest pain.   Gastrointestinal:  Negative for abdominal pain and vomiting.   Genitourinary:  Negative for dysuria.   Musculoskeletal:  Positive for arthralgias and joint swelling.   Skin:  Positive for wound.   Neurological:  Negative for weakness and headaches.   Hematological:  Does not bruise/bleed easily.   Psychiatric/Behavioral:  The patient is nervous/anxious.    All other systems reviewed and are negative.          Objective       ED Triage Vitals   Temperature Pulse Blood Pressure Respirations SpO2 Patient Position - Orthostatic VS   10/25/24 0908 10/25/24 0908 10/25/24 0909 10/25/24 0908 10/25/24 0908 --   (!) 97.4 °F (36.3 °C) 70 (!) 181/102 18 96 %       Temp src Heart Rate Source BP Location FiO2 (%) Pain Score    -- -- -- -- 10/25/24 0908        10 - Worst Possible Pain      Vitals      Date and Time Temp Pulse SpO2 Resp BP Pain Score FACES Pain Rating User   10/25/24 0909 -- -- -- -- 181/102 -- -- LAMBERT   10/25/24 0908 97.4 °F (36.3 °C) 70 96 % 18 -- 10 - Worst Possible Pain -- LAMBERT            Physical Exam  Vitals and nursing note reviewed.   Constitutional:       Appearance: Normal appearance.   HENT:      Head: Normocephalic.      Right Ear: External ear normal.      Left Ear: External ear normal.      Nose: Nose normal.      Mouth/Throat:      Mouth: Mucous membranes are moist.   Eyes:      Conjunctiva/sclera: Conjunctivae normal.   Cardiovascular:      Rate and Rhythm: Normal rate.      Pulses: Normal pulses.      Heart sounds: Normal heart sounds.   Pulmonary:     "  Effort: Pulmonary effort is normal.   Abdominal:      Palpations: Abdomen is soft.      Tenderness: There is no abdominal tenderness.   Musculoskeletal:         General: Normal range of motion.      Cervical back: Normal range of motion.      Right lower leg: Edema present.      Comments: +1 pitting edema   Skin:     General: Skin is warm and dry.      Capillary Refill: Capillary refill takes less than 2 seconds.   Neurological:      General: No focal deficit present.      Mental Status: She is alert.   Psychiatric:         Mood and Affect: Mood normal.         Results Reviewed       None            VAS lower limb venous duplex study, unilateral/limited    (Results Pending)       Procedures    ED Medication and Procedure Management   Prior to Admission Medications   Prescriptions Last Dose Informant Patient Reported? Taking?   Iron-Vitamin C (IRON 100/C PO)  Self Yes No   Semaglutide-Weight Management (Wegovy) 0.25 MG/0.5ML   No No   Sig: Inject 0.25 mg under the skin weekly   Turmeric, Curcuma Longa, (Turmeric Root) POWD  Self Yes No   acetaminophen (TYLENOL) 500 mg tablet   No No   Sig: Take 2 tablets (1,000 mg total) by mouth every 8 (eight) hours for 7 days   aspirin 81 mg chewable tablet   No No   Sig: Chew 1 tablet (81 mg total) 2 (two) times a day for 14 days   cetirizine (ZyrTEC) 10 mg tablet   No No   Sig: Take 1 tablet (10 mg total) by mouth daily for 7 days   clonazePAM (KlonoPIN) 0.5 mg tablet  Self Yes No   Sig: Take 1 tablet by mouth 2 (two) times a day as needed   diphenhydrAMINE (BENADRYL) 50 mg capsule   No No   Sig: Take 1 capsule (50 mg total) by mouth every 6 (six) hours as needed for itching   famotidine (PEPCID) 20 mg tablet   No No   Sig: Take 1 tablet (20 mg total) by mouth 2 (two) times a day for 7 days   naproxen (Naprosyn) 500 mg tablet   No No   Sig: Take 1 tablet (500 mg total) by mouth 2 (two) times a day with meals for 14 days   omeprazole (PriLOSEC) 20 mg delayed release capsule    Yes No   Sig: TAKE 1 CAPSULE BY MOUTH ONCE DAILY BEFORE A MEAL   ondansetron (ZOFRAN) 4 mg tablet   No No   Sig: Take 1 tablet (4 mg total) by mouth every 8 (eight) hours as needed for nausea or vomiting   oxyCODONE (Roxicodone) 5 immediate release tablet   No No   Sig: Take 1 tablet (5 mg total) by mouth every 4 (four) hours as needed for moderate pain for up to 15 doses Max Daily Amount: 30 mg   predniSONE 50 mg tablet   No No   Sig: Take 1 tablet (50 mg total) by mouth daily for 4 days      Facility-Administered Medications: None     Patient's Medications   Discharge Prescriptions    APIXABAN (ELIQUIS) 5 MG    Take 1 tablet (5 mg total) by mouth 2 (two) times a day       Start Date: 10/25/2024End Date: 11/24/2024       Order Dose: 5 mg       Quantity: 60 tablet    Refills: 0     No discharge procedures on file.  ED SEPSIS DOCUMENTATION   Time reflects when diagnosis was documented in both MDM as applicable and the Disposition within this note       Time User Action Codes Description Comment    10/25/2024 10:04 AM Juan Rojas [M79.604] Right leg pain     10/25/2024 10:05 AM Juan Rojas [Z86.718] History of DVT (deep vein thrombosis)                  Juan Rojas MD  10/25/24 1007

## 2024-10-25 NOTE — TELEPHONE ENCOUNTER
Caller: patient     Doctor: Markel     Reason for call: patient suspects she has a blood clot after sx, has pain behind her calf,  will head over to the ED.     Call back#: [phone]

## 2024-10-25 NOTE — TELEPHONE ENCOUNTER
Called and spoke w/pt and she is on her way to ED and spoke w/me on [facility]. State that she has dull ache below right knee in right calf-not swollen. PT measure yesterday and it was same size as other leg and no swollen veins noted. Pt states has hx of DVT w/PE and was on Eliquis for 6 months after a hysterectomy so she is very nervous.  CB if needed.  Informed her that we will be able to see reports in chart like last visit on 10/22/24. Will let Dr Hammond know.

## 2024-10-31 ENCOUNTER — OFFICE VISIT (OUTPATIENT)
Dept: PHYSICAL THERAPY | Facility: CLINIC | Age: 54
End: 2024-10-31
Payer: COMMERCIAL

## 2024-10-31 DIAGNOSIS — Z98.890 S/P ARTHROSCOPIC PARTIAL MEDIAL MENISCECTOMY OF RIGHT KNEE: ICD-10-CM

## 2024-10-31 DIAGNOSIS — M25.561 ACUTE PAIN OF RIGHT KNEE: Primary | ICD-10-CM

## 2024-10-31 DIAGNOSIS — Z87.828 S/P ARTHROSCOPIC PARTIAL MEDIAL MENISCECTOMY OF RIGHT KNEE: ICD-10-CM

## 2024-10-31 PROCEDURE — 97112 NEUROMUSCULAR REEDUCATION: CPT

## 2024-10-31 PROCEDURE — 97110 THERAPEUTIC EXERCISES: CPT

## 2024-10-31 NOTE — PROGRESS NOTES
Daily Note     Today's date: 10/31/2024  Patient name: Barbara A Valentino  : 1970  MRN: [id number]  Referring provider: Ehsan Hammond MD  Dx:   Encounter Diagnosis     ICD-10-CM    1. Acute pain of right knee  M25.561       2. S/P arthroscopic partial medial meniscectomy of right knee  Z98.890     Z87.828                      Subjective: Pt reports that she feels good today. Calf feels much better. Was cleared for possible DVT at ED earlier this week. Placed on blood thinners.       Objective: requires cueing for depth and pacing w/ reg squat and SS work. Corrects and is able to maintain. No pain.      Assessment: Tolerated treatment well. Patient demonstrated fatigue post treatment and would benefit from continued PT. Does well w/ exercise progressions. Fatigue but no increase in pain by end of session. ROM near full, quad set good. Progress loaded knee flexion as able at next visit.      Plan: Continue per plan of care. Strength work as able     Precautions: DOS 10/21/24     POC expires Auth Status Total   Visits  Start date  Expiration date PT/OT + Visit Limit? Co-Insurance   [facility] [id number]   Date Range: 10/24/24-25  # of Visits: 12     No  $25 co-pay                                             Visit/Unit Tracking  AUTH Status:  Date               Visits  Authed: Used                Remaining                    Date (Visit #) IE 10/24 (1) 10/31 (2) (3) (4) (5)   Manual        DTM and TPR                                Exercise Diary         Ther Ex        Active w/u    rec pre 7 min lvl 2-3          PROM  x2-3 mins CP  x2-3 mins         HS/glute/piri stretch  tested  3x30 sec on R LE          QS, SLR, hip abd  QS, s/l hip abd, SLR x10  QS x10, SLR x10, cw/ccw 2x10 B         Active mobility                  Leg press 125# 3x8                              Neuro Re Ed        Bridging  x10  2x10         TrA progressions             Squat training  sit<>stand from airex x10-15  TRX  "squat x20, split squat x20 B         Hip Abd Progressions             Balance  6\" step up x10 total 8\" step up x20       Gait on turf x 3-4 mins X2-3 laps       HEP and POC review  X5 mins X2-3 mins                                      Ther Act             stairs             gait             Sit<>stand                                          Modalities              heat               Ice                                             IE 10/24/24:  Access Code: [email or web address]  URL: [email or web address]  Date: 10/24/2024  Prepared by: Celestino Concepcion    Exercises  - Supine Bridge  - 1 x daily - 7 x weekly - 3 sets - 10 reps  - Supine Active Straight Leg Raise  - 1 x daily - 7 x weekly - 3 sets - 10 reps  - Seated Long Arc Quad  - 1 x daily - 7 x weekly - 3 sets - 10 reps  - Sit to Stand Without Arm Support  - 1 x daily - 7 x weekly - 3 sets - 10 reps  - Step Up  - 1 x daily - 7 x weekly - 3 sets - 10 reps       "

## 2024-11-01 ENCOUNTER — OFFICE VISIT (OUTPATIENT)
Age: 54
End: 2024-11-01

## 2024-11-01 VITALS
BODY MASS INDEX: 28.93 KG/M2 | WEIGHT: 180 LBS | SYSTOLIC BLOOD PRESSURE: 130 MMHG | DIASTOLIC BLOOD PRESSURE: 89 MMHG | HEART RATE: 97 BPM | HEIGHT: 66 IN

## 2024-11-01 DIAGNOSIS — S83.231A COMPLEX TEAR OF MEDIAL MENISCUS OF RIGHT KNEE AS CURRENT INJURY, INITIAL ENCOUNTER: Primary | ICD-10-CM

## 2024-11-01 PROCEDURE — 99024 POSTOP FOLLOW-UP VISIT: CPT | Performed by: ORTHOPAEDIC SURGERY

## 2024-11-01 NOTE — PROGRESS NOTES
Assessment/Plan:  1. Complex tear of medial meniscus of right knee as current injury, initial encounter          The patient is doing well and is asymptomatic today. She will continue PT and can gradually increase her activity as tolerated. She will continue Eliquis until 4 weeks post op. She will FU in 4 weeks for repeat evaluation.     Subjective:   Barbara A Valentino is a 53 y.o. adult who presents today for follow-up of his right knee, now 11 days status post arthroscopic median meniscectomy and chondroplasty. She has been taking Eliquis for DVT prophylaxis as she had a reaction to Lovenox. She does have a history of prior DVT. She denies any real pain about the knee or calf. She notes excellent ROM and improving strength with PT. She notes good ROM of the knee. She denies any paresthesias of the lower extremity.       Review of Systems   Constitutional: Negative.  Negative for chills and fever.   HENT: Negative.  Negative for ear pain and sore throat.    Eyes: Negative.  Negative for pain and redness.   Respiratory: Negative.  Negative for shortness of breath and wheezing.    Cardiovascular:  Negative for chest pain and palpitations.   Gastrointestinal: Negative.  Negative for abdominal pain and blood in stool.   Endocrine: Negative.  Negative for polydipsia and polyuria.   Genitourinary: Negative.  Negative for difficulty urinating and dysuria.   Musculoskeletal:         As noted in HPI   Skin: Negative.  Negative for pallor and rash.   Neurological: Negative.  Negative for dizziness and numbness.   Hematological: Negative.  Negative for adenopathy. Does not bruise/bleed easily.   Psychiatric/Behavioral: Negative.  Negative for confusion and suicidal ideas.          Past Medical History:   Diagnosis Date    Anxiety     Arthritis     DVT (deep venous thrombosis) ([facility])     GERD (gastroesophageal reflux disease)     Osteoarthritis 8/23    Patient denies medical problems     PE (pulmonary thromboembolism) ([facility])      6 mos eliquis       Past Surgical History:   Procedure Laterality Date    CHOLECYSTECTOMY      HYSTERECTOMY N/A 2023    PA ARTHROSCOPY KNEE W/MENISCUS RPR MEDIAL/LATERAL Right 10/21/2024    Procedure: partial medial meiscectomy and chonroplasty medial femoral condyle;  Surgeon: Ehsan Hammond MD;  Location: [facility];  Service: Orthopedics    TONSILLECTOMY         Family History   Adopted: Yes   Family history unknown: Yes       Social History     Occupational History    Not on file   Tobacco Use    Smoking status: Former     Current packs/day: 0.00     Average packs/day: 0.3 packs/day for 10.0 years (2.5 ttl pk-yrs)     Types: Cigarettes     Start date: 2012     Quit date: 2022     Years since quittin.8    Smokeless tobacco: Never    Tobacco comments:     1 pack a week   Vaping Use    Vaping status: Never Used   Substance and Sexual Activity    Alcohol use: Yes     Alcohol/week: 9.0 standard drinks of alcohol     Types: 6 Cans of beer, 3 Standard drinks or equivalent per week     Comment: Weekends    Drug use: Yes     Frequency: 2.0 times per week     Types: Marijuana     Comment: Recreationally and to fall asleep    Sexual activity: Yes     Partners: Male     Birth control/protection: None         Current Outpatient Medications:     apixaban (Eliquis) 5 mg, Take 1 tablet (5 mg total) by mouth 2 (two) times a day, Disp: 60 tablet, Rfl: 0    aspirin 81 mg chewable tablet, Chew 1 tablet (81 mg total) 2 (two) times a day for 14 days, Disp: 28 tablet, Rfl: 0    cetirizine (ZyrTEC) 10 mg tablet, Take 1 tablet (10 mg total) by mouth daily for 7 days, Disp: 7 tablet, Rfl: 0    clonazePAM (KlonoPIN) 0.5 mg tablet, Take 1 tablet by mouth 2 (two) times a day as needed, Disp: , Rfl:     diphenhydrAMINE (BENADRYL) 50 mg capsule, Take 1 capsule (50 mg total) by mouth every 6 (six) hours as needed for itching, Disp: 30 capsule, Rfl: 0    famotidine (PEPCID) 20 mg tablet, Take 1 tablet (20 mg  total) by mouth 2 (two) times a day for 7 days, Disp: 14 tablet, Rfl: 0    Iron-Vitamin C (IRON 100/C PO), , Disp: , Rfl:     naproxen (Naprosyn) 500 mg tablet, Take 1 tablet (500 mg total) by mouth 2 (two) times a day with meals for 14 days, Disp: 28 tablet, Rfl: 0    omeprazole (PriLOSEC) 20 mg delayed release capsule, TAKE 1 CAPSULE BY MOUTH ONCE DAILY BEFORE A MEAL, Disp: , Rfl:     ondansetron (ZOFRAN) 4 mg tablet, Take 1 tablet (4 mg total) by mouth every 8 (eight) hours as needed for nausea or vomiting, Disp: 20 tablet, Rfl: 0    oxyCODONE (Roxicodone) 5 immediate release tablet, Take 1 tablet (5 mg total) by mouth every 4 (four) hours as needed for moderate pain for up to 15 doses Max Daily Amount: 30 mg, Disp: 15 tablet, Rfl: 0    Semaglutide-Weight Management (Wegovy) 0.25 MG/0.5ML, Inject 0.25 mg under the skin weekly, Disp: 2 mL, Rfl: 0    Turmeric, Curcuma Longa, (Turmeric Root) POWD, , Disp: , Rfl:     Allergies   Allergen Reactions    Ginger - Food Allergy Swelling    Lovenox [Enoxaparin] Rash       Objective:  Vitals:    11/01/24 0816   BP: 130/89   Pulse: 97     Pain Score: 0-No pain      Right Knee Exam     Tenderness   The patient is experiencing no tenderness.     Range of Motion   Extension:  0   Flexion:  130     Tests   Varus: negative Valgus: negative    Other   Erythema: absent  Sensation: normal  Pulse: present  Swelling: none  Effusion: no effusion present    Comments:  Sutures removed. Incisions CDI.           Observations     Right Knee   Negative for effusion.       Physical Exam  Constitutional:       General: She is not in acute distress.     Appearance: She is well-developed.   HENT:      Head: Normocephalic and atraumatic.   Eyes:      General: No scleral icterus.     Conjunctiva/sclera: Conjunctivae normal.   Neck:      Vascular: No JVD.   Cardiovascular:      Rate and Rhythm: Normal rate.   Pulmonary:      Effort: Pulmonary effort is normal. No respiratory distress.    Musculoskeletal:      Right knee: No effusion.      Comments: As per HPI   Skin:     General: Skin is warm.   Neurological:      Mental Status: She is alert and oriented to person, place, and time.      Coordination: Coordination normal.               This document was created using speech voice recognition software.   Grammatical errors, random word insertions, pronoun errors, and incomplete sentences are an occasional consequence of this system due to software limitations, ambient noise, and hardware issues.   Any formal questions or concerns about content, text, or information contained within the body of this dictation should be directly addressed to the provider for clarification.

## 2024-11-04 ENCOUNTER — OFFICE VISIT (OUTPATIENT)
Dept: PHYSICAL THERAPY | Facility: CLINIC | Age: 54
End: 2024-11-04
Payer: COMMERCIAL

## 2024-11-04 DIAGNOSIS — Z87.828 S/P ARTHROSCOPIC PARTIAL MEDIAL MENISCECTOMY OF RIGHT KNEE: ICD-10-CM

## 2024-11-04 DIAGNOSIS — Z98.890 S/P ARTHROSCOPIC PARTIAL MEDIAL MENISCECTOMY OF RIGHT KNEE: ICD-10-CM

## 2024-11-04 DIAGNOSIS — M25.561 ACUTE PAIN OF RIGHT KNEE: Primary | ICD-10-CM

## 2024-11-04 PROCEDURE — 97112 NEUROMUSCULAR REEDUCATION: CPT

## 2024-11-04 PROCEDURE — 97110 THERAPEUTIC EXERCISES: CPT

## 2024-11-04 NOTE — PROGRESS NOTES
Daily Note     Today's date: 2024  Patient name: Barbara A Valentino  : 1970  MRN: [id number]  Referring provider: Ehsan Hammond MD  Dx:   Encounter Diagnosis     ICD-10-CM    1. Acute pain of right knee  M25.561       2. S/P arthroscopic partial medial meniscectomy of right knee  Z98.890     Z87.828                      Subjective: Pt reports that her dog jumped on her leg over weekend, scratched incision. Has been keeping area clean and dry. Otherwise feels good.       Objective: ROM WNL active and passive. Requires cueing for depth and pacing w/ loaded knee flexion but is able to correct and maintain.   -incision is well healing w/o signs of infection. Visible scratch noted, educated on signs of infection. Good understanding.       Assessment: Tolerated treatment well. Patient demonstrated fatigue post treatment and would benefit from continued PT. Does well w/ exercise progressions. Fatigue but no increase in pain by end of session. Does well w/ mobility work paired w/ strength progressions. Will look to increase slowly, focus on techniques that emphasize proper gait and single leg strength as able.       Plan: Continue per plan of care. Sled pull, step up progressions      Precautions: DOS 10/21/24     POC expires Auth Status Total   Visits  Start date  Expiration date PT/OT + Visit Limit? Co-Insurance   [facility] [id number]   Date Range: 10/24/24-25  # of Visits: 12 3/12    No  $25 co-pay                                             Visit/Unit Tracking  AUTH Status:  Date               Visits  Authed: Used                Remaining                    Date (Visit #) IE 10/24 (1) 10/31 (2)  (3) (4) (5)   Manual        DTM and TPR                                Exercise Diary         Ther Ex        Active w/u    rec pre 7 min lvl 2-3  Rec pre 7 min       PROM  x2-3 mins CP  x2-3 mins  x1 min CP       HS/glute/piri stretch  tested  3x30 sec on R LE   3x30 sec R LE       QS, SLR, hip  "abd  QS, s/l hip abd, SLR x10  QS x10, SLR x10, cw/ccw 2x10 B QS x10  Cw/ccw 2x10  4# 2x20       Active mobility                  Leg press 125# 3x8 115# x10  135# 3x8                              Neuro Re Ed        Bridging  x10  2x10  2x10 w/ ad sq       TrA progressions             Squat training  sit<>stand from airex x10-15  TRX squat x20, split squat x20 B  TRX reg x10, offset 2x10 B  Split squat 2x10 B       Hip Abd Progressions             Balance  6\" step up x10 total 8\" step up x20 rev      Gait on turf x 3-4 mins X2-3 laps  X1-2 mins    Sled push 50# added 50'x4     HEP and POC review  X5 mins X2-3 mins X1 min                                     Ther Act             stairs             gait             Sit<>stand                                          Modalities              heat               Ice                                             IE 10/24/24:  Access Code: [email or web address]  URL: [email or web address]  Date: 10/24/2024  Prepared by: Celestino Concepcion    Exercises  - Supine Bridge  - 1 x daily - 7 x weekly - 3 sets - 10 reps  - Supine Active Straight Leg Raise  - 1 x daily - 7 x weekly - 3 sets - 10 reps  - Seated Long Arc Quad  - 1 x daily - 7 x weekly - 3 sets - 10 reps  - Sit to Stand Without Arm Support  - 1 x daily - 7 x weekly - 3 sets - 10 reps  - Step Up  - 1 x daily - 7 x weekly - 3 sets - 10 reps         "

## 2024-11-07 ENCOUNTER — OFFICE VISIT (OUTPATIENT)
Dept: PHYSICAL THERAPY | Facility: CLINIC | Age: 54
End: 2024-11-07
Payer: COMMERCIAL

## 2024-11-07 DIAGNOSIS — Z87.828 S/P ARTHROSCOPIC PARTIAL MEDIAL MENISCECTOMY OF RIGHT KNEE: ICD-10-CM

## 2024-11-07 DIAGNOSIS — M25.561 ACUTE PAIN OF RIGHT KNEE: Primary | ICD-10-CM

## 2024-11-07 DIAGNOSIS — Z98.890 S/P ARTHROSCOPIC PARTIAL MEDIAL MENISCECTOMY OF RIGHT KNEE: ICD-10-CM

## 2024-11-07 PROCEDURE — 97110 THERAPEUTIC EXERCISES: CPT

## 2024-11-07 PROCEDURE — 97112 NEUROMUSCULAR REEDUCATION: CPT

## 2024-11-07 NOTE — PROGRESS NOTES
"Daily Note     Today's date: 2024  Patient name: Barbara A Valentino  : 1970  MRN: [id number]  Referring provider: Ehsan Hammond MD  Dx:   Encounter Diagnosis     ICD-10-CM    1. Acute pain of right knee  M25.561       2. S/P arthroscopic partial medial meniscectomy of right knee  Z98.890     Z87.828                      Subjective: Pt reports a feeling of \"crinkled paper\" in the front of her knee that improves with movement.      Objective: see treatment diary below.      Assessment: Tolerated treatment well. Continued advancement of functional strengthening with respect to tissue healing times. Discussed guidelines for gradually reintroducing activity at home and education regarding keeping the knee l=moving and avoiding periods of extended sedentary behaviors.. Patient demonstrated fatigue post treatment and would benefit from continued PT to address functional mobility deficits and return to PLOF.      Plan: Continue per plan of care.        Precautions: DOS 10/21/24     POC expires Auth Status Total   Visits  Start date  Expiration date PT/OT + Visit Limit? Co-Insurance   [facility] [id number]   Date Range: 10/24/24-25  # of Visits: 12     No  $25 co-pay                                             Visit/Unit Tracking  AUTH Status:  Date               Visits  Authed: Used                Remaining                    Date (Visit #) IE 10/24 (1) 10/31 (2)  (3)  (4) (5)   Manual        DTM and TPR                                Exercise Diary         Ther Ex        Active w/u    rec pre 7 min lvl 2-3  Rec pre 7 min RB x 7min     PROM  x2-3 mins CP  x2-3 mins  x1 min CP       HS/glute/piri stretch  tested  3x30 sec on R LE   3x30 sec R LE  3x30 sec R LE     QS, SLR, hip abd  QS, s/l hip abd, SLR x10  QS x10, SLR x10, cw/ccw 2x10 B QS x10  Cw/ccw 2x10  4# 2x20  QS x10  Cw/ccw 2x10  4# 2x20     Active mobility                  Leg press 125# 3x8 115# x10  135# 3x8                " "              Neuro Re Ed        Bridging  x10  2x10  2x10 w/ ad sq       TrA progressions             Squat training  sit<>stand from airex x10-15  TRX squat x20, split squat x20 B  TRX reg x10, offset 2x10 B  Split squat 2x10 B  Circuit: 2 rounds  - TRX squats x10  - TRX reverse lunges x10  - TRX lateral lunge x10     Hip Abd Progressions             Balance  6\" step up x10 total 8\" step up x20 rev      Gait on turf x 3-4 mins X2-3 laps  X1-2 mins    Sled push 50# added 50'x4 Sled push 50# added 50'x4    HEP and POC review  X5 mins X2-3 mins X1 min                                     Ther Act             stairs             gait             Sit<>stand                                          Modalities              heat               Ice                                             IE 10/24/24:  Access Code: [email or web address]  URL: [email or web address]  Date: 10/24/2024  Prepared by: Celestino Concepcion    Exercises  - Supine Bridge  - 1 x daily - 7 x weekly - 3 sets - 10 reps  - Supine Active Straight Leg Raise  - 1 x daily - 7 x weekly - 3 sets - 10 reps  - Seated Long Arc Quad  - 1 x daily - 7 x weekly - 3 sets - 10 reps  - Sit to Stand Without Arm Support  - 1 x daily - 7 x weekly - 3 sets - 10 reps  - Step Up  - 1 x daily - 7 x weekly - 3 sets - 10 reps           "

## 2024-11-12 ENCOUNTER — OFFICE VISIT (OUTPATIENT)
Dept: PHYSICAL THERAPY | Facility: CLINIC | Age: 54
End: 2024-11-12
Payer: COMMERCIAL

## 2024-11-12 DIAGNOSIS — M25.561 ACUTE PAIN OF RIGHT KNEE: Primary | ICD-10-CM

## 2024-11-12 DIAGNOSIS — Z98.890 S/P ARTHROSCOPIC PARTIAL MEDIAL MENISCECTOMY OF RIGHT KNEE: ICD-10-CM

## 2024-11-12 DIAGNOSIS — Z87.828 S/P ARTHROSCOPIC PARTIAL MEDIAL MENISCECTOMY OF RIGHT KNEE: ICD-10-CM

## 2024-11-12 PROCEDURE — 97112 NEUROMUSCULAR REEDUCATION: CPT | Performed by: PHYSICAL THERAPIST

## 2024-11-12 PROCEDURE — 97110 THERAPEUTIC EXERCISES: CPT | Performed by: PHYSICAL THERAPIST

## 2024-11-12 NOTE — PROGRESS NOTES
Daily Note     Today's date: 2024  Patient name: Barbara A Valentino  : 1970  MRN: [id number]  Referring provider: Ehsan Hammond MD  Dx:   Encounter Diagnosis     ICD-10-CM    1. Acute pain of right knee  M25.561       2. S/P arthroscopic partial medial meniscectomy of right knee  Z98.890     Z87.828                      Subjective: Pt reports doing well at this time and feeling like she is making good progress.     Objective: see treatment diary below.    Assessment: Tolerated treatment well. Patient did well with exercises performed today focusing on LE strengthening. Well tolerated lateral step up/overs on BOSU for stability training.     Plan: Continue per plan of care.        Precautions: DOS 10/21/24     POC expires Auth Status Total   Visits  Start date  Expiration date PT/OT + Visit Limit? Co-Insurance   [facility] - [place] [id number]     of 12 used 10/24/24 1/24/25  No  $25 co-pay              Visit/Unit Tracking  AUTH Status:  Date               Visits  Authed: Used     5           Remaining      7              Date (Visit #) IE 10/24 (1) 10/31 (2)  (3)  (4) (5)    Manual                Exercise Diary         Ther Ex        Active w/u    rec pre 7 min lvl 2-3  Rec pre 7 min RB x 7min  RB x 7'   PROM  x2-3 mins CP  x2-3 mins  x1 min CP       HS/glute/piri stretch  tested  3x30 sec on R LE   3x30 sec R LE  3x30 sec R LE  not performed   QS, SLR  QS, s/l hip abd, SLR x10  QS x10, SLR x10, cw/ccw 2x10 B QS x10  Cw/ccw 2x10  4# 2x20  QS x10  Cw/ccw 2x10  4# 2x20     Leg press  Leg press 125# 3x8 115# x10  135# 3x8   115# x10  135# 3*10   Lateral step up/over     20x BOSU                   Neuro Re Ed        Bridging  x10  2x10  2x10 w/ ad sq    HEP   Squat training  sit<>stand from airex x10-15  TRX squat x20, split squat x20 B  TRX reg x10, offset 2x10 B  Split squat 2x10 B  Circuit: 2 rounds  - TRX squats x10  - TRX reverse lunges x10  - TRX lateral lunge x10  Circuit: 2  "rounds  - TRX squats x10  - TRX reverse lunges x10  - TRX lateral lunge x10   Balance  6\" step up x10 total 8\" step up x20 rev      Gait on turf x 3-4 mins X2-3 laps  X1-2 mins    Sled push 50# added 50'x4 Sled push 50# added 50'x4 Sled push 50# added 50'x4   HEP and POC review  X5 mins X2-3 mins X1 min     Rev. CC walk     10x squat walk 17.5#                   Ther Act             stairs             gait             Sit<>stand                               IE 10/24/24:  Access Code: [email or web address]  URL: [email or web address]  Date: 10/24/2024  Prepared by: Celestino Concepcion    Exercises  - Supine Bridge  - 1 x daily - 7 x weekly - 3 sets - 10 reps  - Supine Active Straight Leg Raise  - 1 x daily - 7 x weekly - 3 sets - 10 reps  - Seated Long Arc Quad  - 1 x daily - 7 x weekly - 3 sets - 10 reps  - Sit to Stand Without Arm Support  - 1 x daily - 7 x weekly - 3 sets - 10 reps  - Step Up  - 1 x daily - 7 x weekly - 3 sets - 10 reps       "

## 2024-11-17 PROBLEM — Z12.9 ENCOUNTER FOR SCREENING FOR MALIGNANT NEOPLASM: Status: RESOLVED | Noted: 2024-10-18 | Resolved: 2024-11-17

## 2024-11-19 ENCOUNTER — OFFICE VISIT (OUTPATIENT)
Dept: PHYSICAL THERAPY | Facility: CLINIC | Age: 54
End: 2024-11-19
Payer: COMMERCIAL

## 2024-11-19 DIAGNOSIS — Z98.890 S/P ARTHROSCOPIC PARTIAL MEDIAL MENISCECTOMY OF RIGHT KNEE: ICD-10-CM

## 2024-11-19 DIAGNOSIS — Z87.828 S/P ARTHROSCOPIC PARTIAL MEDIAL MENISCECTOMY OF RIGHT KNEE: ICD-10-CM

## 2024-11-19 DIAGNOSIS — M25.561 ACUTE PAIN OF RIGHT KNEE: Primary | ICD-10-CM

## 2024-11-19 PROCEDURE — 97112 NEUROMUSCULAR REEDUCATION: CPT

## 2024-11-19 PROCEDURE — 97110 THERAPEUTIC EXERCISES: CPT

## 2024-11-19 NOTE — PROGRESS NOTES
"Daily Note     Today's date: 2024  Patient name: Barbara A Valentino  : 1970  MRN: [id number]  Referring provider: Ehsan Hammond MD  Dx:   Encounter Diagnosis     ICD-10-CM    1. Acute pain of right knee  M25.561       2. S/P arthroscopic partial medial meniscectomy of right knee  Z98.890     Z87.828                      Subjective: Patient denies any new complaints.       Objective: See treatment diary below      Assessment: POC discussed with primary PT prior to session.Added resisted lateral steps and increased reps of previously performed exercises.  Barbara A Valentino tolerated treatment well. Patient demonstrated fatigue post treatment, exhibited good technique with therapeutic exercises, and would benefit from continued PT      Plan: Continue per plan of care.  Progress treatment as tolerated.       Precautions: DOS 10/21/24     POC expires Auth Status Total   Visits  Start date  Expiration date PT/OT + Visit Limit? Co-Insurance   [place] - [facility] [place] [id number]    5 of 12 used 10/24/24 1/24/25  No  $25 co-pay              Visit/Unit Tracking  AUTH Status:  Date               Visits  Authed: Used     5           Remaining      7              Date (Visit #) 10/31 (2)  (3)  (4) (5)  (6)    Manual                Exercise Diary         Ther Ex        Active w/u  rec pre 7 min lvl 2-3  Rec pre 7 min RB x 7min  RB x 7' RB x 7 min    PROM  x2-3 mins  x1 min CP        HS/glute/piri stretch  3x30 sec on R LE   3x30 sec R LE  3x30 sec R LE  not performed    QS, SLR  QS x10, SLR x10, cw/ccw 2x10 B QS x10  Cw/ccw 2x10  4# 2x20  QS x10  Cw/ccw 2x10  4# 2x20      Leg press Leg press 125# 3x8 115# x10  135# 3x8   115# x10  135# 3*10 135# 3x10   Lateral step up/over    20x BOSU 20x bosu    Step up      3x8 12\" step 8kg            Neuro Re Ed        Bridging  2x10  2x10 w/ ad sq    HEP    Squat training  TRX squat x20, split squat x20 B  TRX reg x10, offset 2x10 B  Split squat 2x10 B  " "Circuit: 2 rounds  - TRX squats x10  - TRX reverse lunges x10  - TRX lateral lunge x10  Circuit: 2 rounds  - TRX squats x10  - TRX reverse lunges x10  - TRX lateral lunge x10 Circuit: 3 rounds  - TRX squats x10  - TRX reverse lunges x10  - TRX lateral lunge x10   Balance  8\" step up x20 rev       X2-3 laps  X1-2 mins    Sled push 50# added 50'x4 Sled push 50# added 50'x4 Sled push 50# added 50'x4 Sled push 50# added 50' x6    HEP and POC review  X2-3 mins X1 min      Rev. CC walk    10x squat walk 17.5# 10x squat retro walk 17.5#    5x lateral step 12.5# bilat                    Ther Act            stairs            gait            Sit<>stand                             IE 10/24/24:  Access Code: [email or web address]  URL: [email or web address]  Date: 10/24/2024  Prepared by: Celestino Concepcion    Exercises  - Supine Bridge  - 1 x daily - 7 x weekly - 3 sets - 10 reps  - Supine Active Straight Leg Raise  - 1 x daily - 7 x weekly - 3 sets - 10 reps  - Seated Long Arc Quad  - 1 x daily - 7 x weekly - 3 sets - 10 reps  - Sit to Stand Without Arm Support  - 1 x daily - 7 x weekly - 3 sets - 10 reps  - Step Up  - 1 x daily - 7 x weekly - 3 sets - 10 reps         "

## 2024-11-26 NOTE — PROGRESS NOTES
Update 11/26: Pt called to cancel remainder of visits. She feels good, is back to normal activity, no longer requries skilled services. Should she require more help, she is welcome back any time.    Goals  Short term goals (3 weeks):  1) Pt will improve R ROM to WNL pain free.  2) Pt will improve B LE and core strength deficits by 1/3 grade MMT.   3) Pt will reports pain at worse <3/10.  4) Pt will initiate and progress HEP w/ special emphasis on functional quad/hip control and WB capacity.     Long term goals (6 weeks)  1) Pt will improve FOTO to at least 75.  2) Pt will stand and ambulate community distances w/o deficit related to R knee.  3) Pt will be functionally unlimited w/ stairs, step over step w/o UE support.   4) Pt will be independent and compliant w/ HEP in order to maximize functional benefit of skilled PT following d/c.     ALL GOALS ACHIEVED AT TIME OF LAST VISIT

## 2024-12-04 DIAGNOSIS — F41.9 ANXIETY: ICD-10-CM

## 2024-12-04 DIAGNOSIS — K21.9 GASTROESOPHAGEAL REFLUX DISEASE WITHOUT ESOPHAGITIS: Primary | ICD-10-CM

## 2024-12-05 RX ORDER — CLONAZEPAM 0.5 MG/1
0.5 TABLET ORAL 2 TIMES DAILY PRN
Qty: 30 TABLET | Refills: 0 | Status: SHIPPED | OUTPATIENT
Start: 2024-12-05

## 2024-12-06 ENCOUNTER — OFFICE VISIT (OUTPATIENT)
Age: 54
End: 2024-12-06

## 2024-12-06 VITALS
HEIGHT: 66 IN | WEIGHT: 180 LBS | BODY MASS INDEX: 28.93 KG/M2 | DIASTOLIC BLOOD PRESSURE: 84 MMHG | HEART RATE: 99 BPM | SYSTOLIC BLOOD PRESSURE: 123 MMHG

## 2024-12-06 DIAGNOSIS — S83.231D COMPLEX TEAR OF MEDIAL MENISCUS OF RIGHT KNEE AS CURRENT INJURY, SUBSEQUENT ENCOUNTER: ICD-10-CM

## 2024-12-06 DIAGNOSIS — Z09 S/P ORTHOPEDIC SURGERY, FOLLOW-UP EXAM: Primary | ICD-10-CM

## 2024-12-06 DIAGNOSIS — M65.331 TRIGGER MIDDLE FINGER OF RIGHT HAND: ICD-10-CM

## 2024-12-06 PROCEDURE — 99024 POSTOP FOLLOW-UP VISIT: CPT | Performed by: ORTHOPAEDIC SURGERY

## 2024-12-06 NOTE — PROGRESS NOTES
Assessment/Plan:  1. S/P orthopedic surgery, follow-up exam        2. Complex tear of medial meniscus of right knee as current injury, subsequent encounter        3. Trigger middle finger of right hand  Ambulatory Referral to Orthopedic Surgery        Scribe Attestation      I,:  Lori Mar am acting as a scribe while in the presence of the attending physician.:       I,:  Ehsan Hammond MD personally performed the services described in this documentation    as scribed in my presence.:             Rere is 6 weeks S/P right knee arthroscopic median meniscectomy and chondroplasty. I am very pleased with her clinical exam today.  She feels like her pain is all improved from preoperatively.  Discussed continued HEP and scar tissue massage. She may continue to resume activities as tolerated. She no longer requires orthopedic follow up in regards to her knee. Patient notes right long finger pain, suspect trigger finger, a referral to Dr Chery was provided for further evaluation.     Subjective:   Barbara A Valentino is a 53 y.o. adult who presents 6 weeks S/P right knee arthroscopic median meniscectomy and chondroplasty. Patient states she is doing very well and denies any pain. She has been compliant with physical therapy, she has transitioned to HEP. She has been slowly progressing back to her normal gym routine with out issue or complication.       Review of Systems   Constitutional:  Negative for chills and fever.   HENT:  Negative for ear pain and sore throat.    Eyes:  Negative for pain and visual disturbance.   Respiratory:  Negative for cough and shortness of breath.    Cardiovascular:  Negative for chest pain and palpitations.   Gastrointestinal:  Negative for abdominal pain and vomiting.   Genitourinary:  Negative for dysuria and hematuria.   Musculoskeletal:  Negative for arthralgias and back pain.   Skin:  Negative for color change and rash.   Neurological:  Negative for seizures and syncope.    All other systems reviewed and are negative.        Past Medical History:   Diagnosis Date    Anxiety     Arthritis     DVT (deep venous thrombosis) ([facility])     GERD (gastroesophageal reflux disease)     Osteoarthritis     Patient denies medical problems     PE (pulmonary thromboembolism) ([facility])     6 mos eliquis       Past Surgical History:   Procedure Laterality Date    CHOLECYSTECTOMY      HYSTERECTOMY N/A 2023    ND ARTHROSCOPY KNEE W/MENISCUS RPR MEDIAL/LATERAL Right 10/21/2024    Procedure: partial medial meiscectomy and chonroplasty medial femoral condyle;  Surgeon: Ehsan Hammond MD;  Location: [facility];  Service: Orthopedics    TONSILLECTOMY         Family History   Adopted: Yes   Family history unknown: Yes       Social History     Occupational History    Not on file   Tobacco Use    Smoking status: Former     Current packs/day: 0.00     Average packs/day: 0.3 packs/day for 10.0 years (2.5 ttl pk-yrs)     Types: Cigarettes     Start date: 2012     Quit date: 2022     Years since quittin.9    Smokeless tobacco: Never    Tobacco comments:     1 pack a week   Vaping Use    Vaping status: Never Used   Substance and Sexual Activity    Alcohol use: Yes     Alcohol/week: 9.0 standard drinks of alcohol     Types: 6 Cans of beer, 3 Standard drinks or equivalent per week     Comment: Weekends    Drug use: Yes     Frequency: 2.0 times per week     Types: Marijuana     Comment: Recreationally and to fall asleep    Sexual activity: Yes     Partners: Male     Birth control/protection: None         Current Outpatient Medications:     clonazePAM (KlonoPIN) 0.5 mg tablet, Take 1 tablet (0.5 mg total) by mouth 2 (two) times a day as needed for anxiety, Disp: 30 tablet, Rfl: 0    Iron-Vitamin C (IRON 100/C PO), , Disp: , Rfl:     omeprazole (PriLOSEC) 20 mg delayed release capsule, Take 1 capsule (20 mg total) by mouth daily, Disp: 90 capsule, Rfl: 0    Turmeric, Curcuma Longa, (Turmeric  Root) KARINA, , Disp: , Rfl:     Allergies   Allergen Reactions    Mary - Food Allergy Swelling    Lovenox [Enoxaparin] Rash       Objective:  Vitals:    12/06/24 0842   BP: 123/84   Pulse: 99       Right Knee Exam     Tenderness   The patient is experiencing no tenderness.     Range of Motion   Extension:  0   Flexion:  130     Tests   Varus: negative Valgus: negative    Other   Erythema: absent  Sensation: normal  Pulse: present  Swelling: none  Effusion: no effusion present    Comments:    Skin is warm and dry to touch with no signs of erythema, ecchymosis, or infection   Flexor and extensor mechanisms are intact   Knee is stable to varus and valgus stress  Patella tracks centrally   Calf compartments are soft and supple  (-) Vicky's sign  2+ DP and PT pulses with brisk capillary refill to the toes  Sural, saphenous, tibial, superficial, and deep peroneal motor and sensory distributions intact  Sensation light touch intact distally              Observations     Right Knee   Negative for effusion.       Physical Exam  Vitals and nursing note reviewed.   Constitutional:       Appearance: Normal appearance.   HENT:      Head: Normocephalic and atraumatic.      Right Ear: External ear normal.      Left Ear: External ear normal.   Eyes:      Extraocular Movements: Extraocular movements intact.      Conjunctiva/sclera: Conjunctivae normal.   Cardiovascular:      Rate and Rhythm: Normal rate.      Pulses: Normal pulses.   Pulmonary:      Effort: Pulmonary effort is normal.   Musculoskeletal:         General: Normal range of motion.      Cervical back: Normal range of motion and neck supple.      Right knee: No effusion.      Comments: See ortho exam   Skin:     General: Skin is warm and dry.   Neurological:      General: No focal deficit present.      Mental Status: She is alert.   Psychiatric:         Behavior: Behavior normal.         I have personally reviewed pertinent films in PACS and my interpretation is as  follows:  No new images obtained today       This document was created using speech voice recognition software.   Grammatical errors, random word insertions, pronoun errors, and incomplete sentences are an occasional consequence of this system due to software limitations, ambient noise, and hardware issues.   Any formal questions or concerns about content, text, or information contained within the body of this dictation should be directly addressed to the provider for clarification.

## 2024-12-13 ENCOUNTER — OFFICE VISIT (OUTPATIENT)
Dept: OBGYN CLINIC | Facility: CLINIC | Age: 54
End: 2024-12-13
Payer: COMMERCIAL

## 2024-12-13 VITALS
BODY MASS INDEX: 29.7 KG/M2 | SYSTOLIC BLOOD PRESSURE: 120 MMHG | HEART RATE: 76 BPM | WEIGHT: 184.8 LBS | HEIGHT: 66 IN | DIASTOLIC BLOOD PRESSURE: 86 MMHG | OXYGEN SATURATION: 97 %

## 2024-12-13 DIAGNOSIS — M65.331 TRIGGER MIDDLE FINGER OF RIGHT HAND: ICD-10-CM

## 2024-12-13 PROCEDURE — 20550 NJX 1 TENDON SHEATH/LIGAMENT: CPT | Performed by: STUDENT IN AN ORGANIZED HEALTH CARE EDUCATION/TRAINING PROGRAM

## 2024-12-13 PROCEDURE — 99213 OFFICE O/P EST LOW 20 MIN: CPT | Performed by: STUDENT IN AN ORGANIZED HEALTH CARE EDUCATION/TRAINING PROGRAM

## 2024-12-13 RX ORDER — BETAMETHASONE SODIUM PHOSPHATE AND BETAMETHASONE ACETATE 3; 3 MG/ML; MG/ML
3 INJECTION, SUSPENSION INTRA-ARTICULAR; INTRALESIONAL; INTRAMUSCULAR; SOFT TISSUE
Status: COMPLETED | OUTPATIENT
Start: 2024-12-13 | End: 2024-12-13

## 2024-12-13 RX ORDER — LIDOCAINE HYDROCHLORIDE 10 MG/ML
1 INJECTION, SOLUTION INFILTRATION; PERINEURAL
Status: COMPLETED | OUTPATIENT
Start: 2024-12-13 | End: 2024-12-13

## 2024-12-13 RX ADMIN — LIDOCAINE HYDROCHLORIDE 1 ML: 10 INJECTION, SOLUTION INFILTRATION; PERINEURAL at 08:15

## 2024-12-13 RX ADMIN — BETAMETHASONE SODIUM PHOSPHATE AND BETAMETHASONE ACETATE 3 MG: 3; 3 INJECTION, SUSPENSION INTRA-ARTICULAR; INTRALESIONAL; INTRAMUSCULAR; SOFT TISSUE at 08:15

## 2024-12-13 NOTE — PROGRESS NOTES
ORTHOPAEDIC HAND, WRIST, AND ELBOW OFFICE  VISIT     ASSESSMENT/PLAN:    Barbara A Valentino is a 53 y.o. RHD female who presents with right long trigger finger s/p CSI 12/13/24    Etiology and treatment options discussed, all her questions were addressed  A right long finger corticosteroid injection was offered, accepted, and administered in clinic. Procedure tolerated well, outlined below. Post injection protocol advised.         The patient verbalized understanding of exam findings and treatment plan. We engaged in the shared decision-making process and treatment options were discussed at length with the patient. Surgical and conservative management discussed today along with risks and benefits.    Follow Up:  If symptoms worsen or fail to improve       General Discussions:    Trigger FInger: The anatomy and physiology of trigger finger was discussed with the patient today in the office.  Edema and increased contact pressure within the flexor tendons at the A1 pulley can cause pain, crepitation, and limitation of function.  Treatment options include resting MP blocking splints to decrease edema, oral anti-inflammatory medications, home or formal therapy exercises, up to 2 steroid injections within the tendon sheath, or surgical release.  While majority of patients do respond to conservative treatment, up to 20% may require surgical release.       ____________________________________________________________________________________________________________________________________________      CHIEF COMPLAINT:  Trigger finger    SUBJECTIVE:  Barbara A Valentino is a 53 y.o. female who presents with right long finger trigger finger. Patient states her symptoms started a year ago. She notes pain to the A1 pulley, locking of the long finger, states she is most symptomatic in the morning. She takes tumeric daily which helps.   Radiation: None  Previous Treatments: None   Associated symptoms: Locking  Handedness:  right  Work status:     I have personally reviewed all the relevant PMH, PSH, SH, FH, Medications and allergies      PAST MEDICAL HISTORY:  Past Medical History:   Diagnosis Date    Anxiety     Arthritis     DVT (deep venous thrombosis) ([facility])     GERD (gastroesophageal reflux disease)     Osteoarthritis     Patient denies medical problems     PE (pulmonary thromboembolism) ([facility])     6 mos eliquis       PAST SURGICAL HISTORY:  Past Surgical History:   Procedure Laterality Date    CHOLECYSTECTOMY      HYSTERECTOMY N/A 2023    [facility] ARTHROSCOPY KNEE W/MENISCUS RPR MEDIAL/LATERAL Right 10/21/2024    Procedure: partial medial meiscectomy and chonroplasty medial femoral condyle;  Surgeon: Ehsan Hammond MD;  Location: [facility];  Service: Orthopedics    TONSILLECTOMY         FAMILY HISTORY:  Family History   Adopted: Yes   Family history unknown: Yes       SOCIAL HISTORY:  Social History     Tobacco Use    Smoking status: Former     Current packs/day: 0.00     Average packs/day: 0.3 packs/day for 10.0 years (2.5 ttl pk-yrs)     Types: Cigarettes     Start date: 2012     Quit date: 2022     Years since quittin.9    Smokeless tobacco: Never    Tobacco comments:     1 pack a week   Vaping Use    Vaping status: Never Used   Substance Use Topics    Alcohol use: Yes     Alcohol/week: 9.0 standard drinks of alcohol     Types: 6 Cans of beer, 3 Standard drinks or equivalent per week     Comment: Weekends    Drug use: Yes     Frequency: 2.0 times per week     Types: Marijuana     Comment: Recreationally and to fall asleep       MEDICATIONS:    Current Outpatient Medications:     clonazePAM (KlonoPIN) 0.5 mg tablet, Take 1 tablet (0.5 mg total) by mouth 2 (two) times a day as needed for anxiety, Disp: 30 tablet, Rfl: 0    Iron-Vitamin C (IRON 100/C PO), , Disp: , Rfl:     omeprazole (PriLOSEC) 20 mg delayed release capsule, Take 1 capsule (20 mg total) by mouth daily, Disp: 90 capsule, Rfl: 0     Turmeric, Curcuma Longa, (Turmeric Root) POWD, , Disp: , Rfl:     ALLERGIES:  Allergies   Allergen Reactions    Mary - Food Allergy Swelling    Lovenox [Enoxaparin] Rash           REVIEW OF SYSTEMS:  Pertinent items are noted in HPI.  A comprehensive review of systems was negative.    VITALS:  Vitals:    12/13/24 0819   BP: 120/86   Pulse: 76   SpO2: 97%       LABS:  HgA1c:   Lab Results   Component Value Date    HGBA1C 5.9 (H) 10/12/2024     BMP:   Lab Results   Component Value Date    CALCIUM 9.6 10/12/2024    K 4.0 10/12/2024    CO2 24 10/12/2024     10/12/2024    BUN 18 10/12/2024    CREATININE 0.78 10/12/2024       _____________________________________________________  PHYSICAL EXAMINATION:  General: well developed and well nourished, alert, oriented times 3, and appears comfortable  Psychiatric: Normal  HEENT: Normocephalic, Atraumatic Trachea Midline, No torticollis  Pulmonary: No audible wheezing or respiratory distress   Abdomen/GI: Non tender, non distended   Cardiovascular: No pitting edema, 2+ radial pulse   Skin: No masses, erythema, lacerations, fluctation, ulcerations  Neurovascular: Sensation Intact to the Median, Ulnar, Radial Nerve, Motor Intact to the Median, Ulnar, Radial Nerve, and Pulses Intact  Musculoskeletal: Normal, except as noted in detailed exam and in HPI.        FOCUSED MUSCULOSKELETAL EXAMINATION:  Right Upper Extremity  Inspection: skin intact, no notable deformity   Palpation: TTP A1 pulley long finger with nodule   Neurologic: 5/5 elbow flexion, 5/5 elbow extension, 5/5 wrist extension, 5/5 wrist flexion, 5/5 finger flexion, 5/5 finger extension, 5/5 FPL, 5/5 EPL, 5/5 APB, 5/5 intrinsics, sensation intact to median, radial, and ulnar nerve distributions  Vascular: Palpable radial pulse, brisk cap refill <2sec, hand warm and well perfused  MSK:   RIGHT SIDE:  Finger:  Triggering  long finger      ___________________________________________________  STUDIES REVIEWED:  No new  images obtained today    LABS REVIEWED:    HgA1c:   Lab Results   Component Value Date    HGBA1C 5.9 (H) 10/12/2024     BMP:   Lab Results   Component Value Date    CALCIUM 9.6 10/12/2024    K 4.0 10/12/2024    CO2 24 10/12/2024     10/12/2024    BUN 18 10/12/2024    CREATININE 0.78 10/12/2024               PROCEDURES PERFORMED:  Hand/upper extremity injection: R long A1  Universal Protocol:  procedure performed by consultantConsent: Verbal consent obtained.  Risks and benefits: risks, benefits and alternatives were discussed  Consent given by: patient  Timeout called at: 12/13/2024 8:30 AM.  Patient understanding: patient states understanding of the procedure being performed  Patient identity confirmed: verbally with patient  Supporting Documentation  Indications: diagnostic, tendon swelling and pain   Procedure Details  Condition:trigger finger Location: long finger - R long A1   Preparation: Patient was prepped and draped in the usual sterile fashion  Needle size: 25 G  Ultrasound guidance: no  Medications administered: 1 mL lidocaine 1 %; 3 mg betamethasone acetate-betamethasone sodium phosphate 6 (3-3) mg/mL  Patient tolerance: patient tolerated the procedure well with no immediate complications  Dressing:  Sterile dressing applied             _____________________________________________________      Scribe Attestation      I,:   am acting as a scribe while in the presence of the attending physician.:       I,:   personally performed the services described in this documentation    as scribed in my presence.:               I agree with the history, physical examination, assessment and plan of care as documented above.    Sim Chery M.D.  Attending, Orthopaedic Surgery  Hand, Wrist, and Elbow Surgery  [facility]

## 2025-01-08 ENCOUNTER — OFFICE VISIT (OUTPATIENT)
Dept: FAMILY MEDICINE CLINIC | Facility: CLINIC | Age: 55
End: 2025-01-08
Payer: COMMERCIAL

## 2025-01-08 VITALS
HEART RATE: 77 BPM | OXYGEN SATURATION: 98 % | TEMPERATURE: 98.2 F | SYSTOLIC BLOOD PRESSURE: 122 MMHG | HEIGHT: 66 IN | WEIGHT: 184.4 LBS | BODY MASS INDEX: 29.63 KG/M2 | DIASTOLIC BLOOD PRESSURE: 72 MMHG

## 2025-01-08 DIAGNOSIS — J01.00 ACUTE NON-RECURRENT MAXILLARY SINUSITIS: Primary | ICD-10-CM

## 2025-01-08 PROCEDURE — 99213 OFFICE O/P EST LOW 20 MIN: CPT

## 2025-01-08 RX ORDER — AZITHROMYCIN 250 MG/1
TABLET, FILM COATED ORAL
Qty: 6 TABLET | Refills: 0 | Status: SHIPPED | OUTPATIENT
Start: 2025-01-08 | End: 2025-01-13

## 2025-01-08 RX ORDER — METHYLPREDNISOLONE 4 MG/1
TABLET ORAL
Qty: 21 EACH | Refills: 0 | Status: SHIPPED | OUTPATIENT
Start: 2025-01-08

## 2025-01-08 NOTE — PROGRESS NOTES
"Name: Barbara A Valentino      : 1970      MRN: [id number]  Encounter Provider: CHUCHO Bender  Encounter Date: 2025   Encounter department: [facility]  :  Assessment & Plan  Acute non-recurrent maxillary sinusitis  Try flonase for post nasal drip and cough  Lozenges  Salt water gargles  Orders:    azithromycin (Zithromax) 250 mg tablet; Take 2 tablets (500 mg total) by mouth daily for 1 day, THEN 1 tablet (250 mg total) daily for 4 days.    methylPREDNISolone 4 MG tablet therapy pack; Use as directed on package           History of Present Illness     Here with complains of sinus pain and pressure for 10 days. denies fever/chills. Does have a cough non productive and complains of post nasal drip which is causing a sore throat and voice changes. Denies any sick contacts works from home. Has a history of sinus infections in the past and feels like this is the same but came in because she \"can't take it anymore with the head congestion\".    Sinusitis  This is a new problem. The current episode started yesterday. The problem is unchanged. There has been no fever. The pain is mild. Associated symptoms include congestion, coughing, headaches, a hoarse voice and sinus pressure. Pertinent negatives include no chills, ear pain, shortness of breath, sore throat or swollen glands. Past treatments include oral decongestants and saline nose sprays. The treatment provided no relief.   Cough  This is a new problem. The current episode started 1 to 4 weeks ago. The problem has been waxing and waning. The problem occurs every few hours. The cough is Non-productive. Associated symptoms include headaches and postnasal drip. Pertinent negatives include no chest pain, chills, ear pain, fever, sore throat, shortness of breath or wheezing. Nothing aggravates the symptoms. She has tried nothing for the symptoms. The treatment provided no relief. There is no history of asthma, COPD, emphysema, " "environmental allergies or pneumonia.     Review of Systems   Constitutional:  Positive for fatigue. Negative for activity change, chills and fever.   HENT:  Positive for congestion, hoarse voice, postnasal drip, sinus pressure, sinus pain and voice change. Negative for ear pain and sore throat.    Eyes: Negative.    Respiratory:  Positive for cough. Negative for chest tightness, shortness of breath and wheezing.    Cardiovascular: Negative.  Negative for chest pain and palpitations.   Gastrointestinal: Negative.  Negative for diarrhea, nausea and vomiting.   Endocrine: Negative.    Genitourinary: Negative.    Musculoskeletal: Negative.    Skin:  Positive for pallor.   Allergic/Immunologic: Negative for environmental allergies.   Neurological:  Positive for headaches. Negative for dizziness, weakness and light-headedness.   Hematological: Negative.    Psychiatric/Behavioral: Negative.         Objective   /72 (BP Location: Left arm)   Temp 98.2 °F (36.8 °C) (Temporal)   Ht 5' 6\" (1.676 m)   Wt 83.6 kg (184 lb 6.4 oz)   LMP 02/27/2020   BMI 29.76 kg/m²      Physical Exam  Vitals reviewed.   Constitutional:       Appearance: Normal appearance. She is normal weight.   HENT:      Head: Normocephalic.      Right Ear: Tympanic membrane, ear canal and external ear normal.      Left Ear: Tympanic membrane, ear canal and external ear normal.      Nose: Congestion present.      Mouth/Throat:      Mouth: Mucous membranes are moist.      Pharynx: Posterior oropharyngeal erythema present.   Eyes:      Conjunctiva/sclera: Conjunctivae normal.      Pupils: Pupils are equal, round, and reactive to light.   Cardiovascular:      Rate and Rhythm: Normal rate and regular rhythm.      Pulses: Normal pulses.      Heart sounds: Normal heart sounds.   Pulmonary:      Effort: Pulmonary effort is normal.      Breath sounds: Normal breath sounds.   Abdominal:      General: Abdomen is flat. Bowel sounds are normal.      Palpations: " Abdomen is soft.   Musculoskeletal:         General: Normal range of motion.      Cervical back: Normal range of motion and neck supple.   Skin:     General: Skin is warm and dry.      Capillary Refill: Capillary refill takes less than 2 seconds.      Coloration: Skin is pale.   Neurological:      General: No focal deficit present.      Mental Status: She is alert and oriented to person, place, and time. Mental status is at baseline.   Psychiatric:         Mood and Affect: Mood normal.         Behavior: Behavior normal.         Thought Content: Thought content normal.         Judgment: Judgment normal.

## 2025-02-07 ENCOUNTER — HOSPITAL ENCOUNTER (OUTPATIENT)
Dept: RADIOLOGY | Facility: HOSPITAL | Age: 55
Discharge: HOME | End: 2025-02-07
Payer: COMMERCIAL

## 2025-02-07 VITALS — WEIGHT: 173 LBS | HEIGHT: 66 IN | BODY MASS INDEX: 27.8 KG/M2

## 2025-02-07 DIAGNOSIS — Z12.9 ENCOUNTER FOR SCREENING FOR MALIGNANT NEOPLASM: ICD-10-CM

## 2025-02-07 PROCEDURE — 77063 BREAST TOMOSYNTHESIS BI: CPT

## 2025-02-07 PROCEDURE — 77067 SCR MAMMO BI INCL CAD: CPT

## 2025-02-13 ENCOUNTER — HOSPITAL ENCOUNTER (OUTPATIENT)
Dept: RADIOLOGY | Facility: HOSPITAL | Age: 55
Discharge: HOME | End: 2025-02-13
Payer: COMMERCIAL

## 2025-02-13 DIAGNOSIS — Z78.0 POSTMENOPAUSAL: ICD-10-CM

## 2025-02-13 PROCEDURE — 77080 DXA BONE DENSITY AXIAL: CPT

## 2025-02-18 ENCOUNTER — HOSPITAL ENCOUNTER (EMERGENCY)
Facility: HOSPITAL | Age: 55
Discharge: HOME | End: 2025-02-18
Payer: COMMERCIAL

## 2025-02-18 ENCOUNTER — TELEPHONE (OUTPATIENT)
Age: 55
End: 2025-02-18

## 2025-02-18 ENCOUNTER — APPOINTMENT (EMERGENCY)
Dept: RADIOLOGY | Facility: HOSPITAL | Age: 55
End: 2025-02-18
Payer: COMMERCIAL

## 2025-02-18 VITALS
BODY MASS INDEX: 30.34 KG/M2 | RESPIRATION RATE: 18 BRPM | HEART RATE: 75 BPM | WEIGHT: 188 LBS | SYSTOLIC BLOOD PRESSURE: 100 MMHG | TEMPERATURE: 99.6 F | DIASTOLIC BLOOD PRESSURE: 56 MMHG | OXYGEN SATURATION: 97 %

## 2025-02-18 DIAGNOSIS — J06.9 VIRAL URI WITH COUGH: Primary | ICD-10-CM

## 2025-02-18 LAB
ALBUMIN SERPL BCG-MCNC: 4.4 G/DL (ref 3.5–5)
ALP SERPL-CCNC: 88 U/L (ref 34–104)
ALT SERPL W P-5'-P-CCNC: 19 U/L (ref 7–52)
ANION GAP SERPL CALCULATED.3IONS-SCNC: 10 MMOL/L (ref 4–13)
AST SERPL W P-5'-P-CCNC: 19 U/L (ref 13–39)
BASOPHILS # BLD AUTO: 0.08 THOUSANDS/ΜL (ref 0–0.1)
BASOPHILS NFR BLD AUTO: 2 % (ref 0–1)
BILIRUB SERPL-MCNC: 0.3 MG/DL (ref 0.2–1)
BUN SERPL-MCNC: 20 MG/DL (ref 5–25)
CALCIUM SERPL-MCNC: 9 MG/DL (ref 8.4–10.2)
CHLORIDE SERPL-SCNC: 104 MMOL/L (ref 96–108)
CO2 SERPL-SCNC: 23 MMOL/L (ref 21–32)
CREAT SERPL-MCNC: 0.91 MG/DL (ref 0.6–1.3)
EOSINOPHIL # BLD AUTO: 0.21 THOUSAND/ΜL (ref 0–0.61)
EOSINOPHIL NFR BLD AUTO: 5 % (ref 0–6)
ERYTHROCYTE [DISTWIDTH] IN BLOOD BY AUTOMATED COUNT: 14.6 % (ref 11.6–15.1)
FLUAV AG UPPER RESP QL IA.RAPID: NEGATIVE
FLUBV AG UPPER RESP QL IA.RAPID: NEGATIVE
GFR SERPL CREATININE-BSD FRML MDRD: 71 ML/MIN/1.73SQ M
GLUCOSE SERPL-MCNC: 100 MG/DL (ref 65–140)
HCT VFR BLD AUTO: 37.7 % (ref 34.8–46.1)
HGB BLD-MCNC: 12.3 G/DL (ref 11.5–15.4)
IMM GRANULOCYTES # BLD AUTO: 0 THOUSAND/UL (ref 0–0.2)
IMM GRANULOCYTES NFR BLD AUTO: 0 % (ref 0–2)
LYMPHOCYTES # BLD AUTO: 1.31 THOUSANDS/ΜL (ref 0.6–4.47)
LYMPHOCYTES NFR BLD AUTO: 29 % (ref 14–44)
MCH RBC QN AUTO: 28.4 PG (ref 26.8–34.3)
MCHC RBC AUTO-ENTMCNC: 32.6 G/DL (ref 31.4–37.4)
MCV RBC AUTO: 87 FL (ref 82–98)
MONOCYTES # BLD AUTO: 0.45 THOUSAND/ΜL (ref 0.17–1.22)
MONOCYTES NFR BLD AUTO: 10 % (ref 4–12)
NEUTROPHILS # BLD AUTO: 2.53 THOUSANDS/ΜL (ref 1.85–7.62)
NEUTS SEG NFR BLD AUTO: 54 % (ref 43–75)
NRBC BLD AUTO-RTO: 0 /100 WBCS
PLATELET # BLD AUTO: 230 THOUSANDS/UL (ref 149–390)
PMV BLD AUTO: 10.3 FL (ref 8.9–12.7)
POTASSIUM SERPL-SCNC: 4 MMOL/L (ref 3.5–5.3)
PROCALCITONIN SERPL-MCNC: 0.07 NG/ML
PROT SERPL-MCNC: 6.6 G/DL (ref 6.4–8.4)
RBC # BLD AUTO: 4.33 MILLION/UL (ref 3.81–5.12)
SARS-COV+SARS-COV-2 AG RESP QL IA.RAPID: NEGATIVE
SODIUM SERPL-SCNC: 137 MMOL/L (ref 135–147)
WBC # BLD AUTO: 4.58 THOUSAND/UL (ref 4.31–10.16)

## 2025-02-18 PROCEDURE — 96375 TX/PRO/DX INJ NEW DRUG ADDON: CPT

## 2025-02-18 PROCEDURE — 87804 INFLUENZA ASSAY W/OPTIC: CPT

## 2025-02-18 PROCEDURE — 99284 EMERGENCY DEPT VISIT MOD MDM: CPT

## 2025-02-18 PROCEDURE — 84145 PROCALCITONIN (PCT): CPT

## 2025-02-18 PROCEDURE — 87811 SARS-COV-2 COVID19 W/OPTIC: CPT

## 2025-02-18 PROCEDURE — 80053 COMPREHEN METABOLIC PANEL: CPT

## 2025-02-18 PROCEDURE — 96374 THER/PROPH/DIAG INJ IV PUSH: CPT

## 2025-02-18 PROCEDURE — 85025 COMPLETE CBC W/AUTO DIFF WBC: CPT

## 2025-02-18 PROCEDURE — 71046 X-RAY EXAM CHEST 2 VIEWS: CPT

## 2025-02-18 PROCEDURE — 93005 ELECTROCARDIOGRAM TRACING: CPT

## 2025-02-18 PROCEDURE — 36415 COLL VENOUS BLD VENIPUNCTURE: CPT

## 2025-02-18 PROCEDURE — 96361 HYDRATE IV INFUSION ADD-ON: CPT

## 2025-02-18 PROCEDURE — 99285 EMERGENCY DEPT VISIT HI MDM: CPT

## 2025-02-18 RX ORDER — BENZONATATE 100 MG/1
100 CAPSULE ORAL 3 TIMES DAILY PRN
Qty: 20 CAPSULE | Refills: 0 | Status: SHIPPED | OUTPATIENT
Start: 2025-02-18

## 2025-02-18 RX ORDER — ONDANSETRON 2 MG/ML
4 INJECTION INTRAMUSCULAR; INTRAVENOUS ONCE
Status: COMPLETED | OUTPATIENT
Start: 2025-02-18 | End: 2025-02-18

## 2025-02-18 RX ORDER — KETOROLAC TROMETHAMINE 30 MG/ML
15 INJECTION, SOLUTION INTRAMUSCULAR; INTRAVENOUS ONCE
Status: COMPLETED | OUTPATIENT
Start: 2025-02-18 | End: 2025-02-18

## 2025-02-18 RX ADMIN — KETOROLAC TROMETHAMINE 15 MG: 30 INJECTION, SOLUTION INTRAMUSCULAR; INTRAVENOUS at 19:33

## 2025-02-18 RX ADMIN — SODIUM CHLORIDE 1000 ML: 0.9 INJECTION, SOLUTION INTRAVENOUS at 19:35

## 2025-02-18 RX ADMIN — ONDANSETRON 4 MG: 2 INJECTION INTRAMUSCULAR; INTRAVENOUS at 19:33

## 2025-02-18 NOTE — TELEPHONE ENCOUNTER
Patient calling post pneumonia diagnosis and treatment 1/8/25. Was treated with methylPREDNISolone 4 MG tablet therapy pack  and   azithromycin (Zithromax) 250 mg tablet.   Patient reports as of several days ago she started feeling achy, has chest tightness  and it is hard to take a deep breathe. Also reports a productive cough with a brown sputum. Unable to check temperature but states she has woken up soaking wet several times. Patient states she feels similar to how she felt last month. OV scheduled for 2/19/25 at 0930 with PCP.

## 2025-02-19 LAB
ATRIAL RATE: 84 BPM
P AXIS: 42 DEGREES
PR INTERVAL: 138 MS
QRS AXIS: 42 DEGREES
QRSD INTERVAL: 78 MS
QT INTERVAL: 370 MS
QTC INTERVAL: 438 MS
T WAVE AXIS: 76 DEGREES
VENTRICULAR RATE: 84 BPM

## 2025-02-19 PROCEDURE — 93010 ELECTROCARDIOGRAM REPORT: CPT | Performed by: INTERNAL MEDICINE

## 2025-02-19 NOTE — ED PROVIDER NOTES
Time reflects when diagnosis was documented in both MDM as applicable and the Disposition within this note       Time User Action Codes Description Comment    2/18/2025  8:18 PM Getachew Pickett Add [J06.9] Viral URI with cough           ED Disposition       ED Disposition   Discharge    Condition   Stable    Date/Time   Tue Feb 18, 2025  8:14 PM    Comment   Barbara A Valentino discharge to home/self care.                   Assessment & Plan       Medical Decision Making  Amount and/or Complexity of Data Reviewed  Labs: ordered.  Radiology: ordered and independent interpretation performed.  ECG/medicine tests:  Decision-making details documented in ED Course.    Risk  OTC drugs.  Prescription drug management.      55 y/o F presents with fever, cough, sob. Pt states about a month ago was on antibiotics and steroids for pneumonia. Concerned sx have returned. Feels dizzy and nauseous as well. No leg swelling or VTE risk factors. CP pts feels 2/2 coughing. Pt feels she is dehydrated.   VSS.  Exam nonfocal, lungs clear  CXR obtained to r/o pneumonia, no focal findings on my interpretation  Labs and viral testing normal  Pt feeling better after fluid bolus  Likely viral illness. Stable for discharge at this time with RTED precautions, ongoing supportive care.       ED Course as of 02/20/25 0053   Tue Feb 18, 2025   1923 ECG 12 lead  Procedure Note: EKG  Date/Time: 02/18/25 7:23 PM   Interpreted by: Getachew Pickett MD  Indications / Diagnosis: sob  ECG reviewed by me, the ED Provider: yes   The EKG demonstrates:  Rhythm: normal sinus  Intervals: normal intervals  Axis: normal axis  QRS/Blocks: normal QRS  ST Changes: No acute ST Changes, no STD/JESSY.         Medications   sodium chloride 0.9 % bolus 1,000 mL (0 mL Intravenous Stopped 2/18/25 2030)   ketorolac (TORADOL) injection 15 mg (15 mg Intravenous Given 2/18/25 1933)   ondansetron (ZOFRAN) injection 4 mg (4 mg Intravenous Given 2/18/25 1933)       ED Risk Strat Scores                             SBIRT 20yo+      Flowsheet Row Most Recent Value   Initial Alcohol Screen: US AUDIT-C     1. How often do you have a drink containing alcohol? 0 Filed at: 02/18/2025 1939   2. How many drinks containing alcohol do you have on a typical day you are drinking?  0 Filed at: 02/18/2025 1939   3a. Male UNDER 65: How often do you have five or more drinks on one occasion? 0 Filed at: 02/18/2025 1939   3b. FEMALE Any Age, or MALE 65+: How often do you have 4 or more drinks on one occassion? 0 Filed at: 02/18/2025 1939   Audit-C Score 0 Filed at: 02/18/2025 1939   SPENCER: How many times in the past year have you...    Used an illegal drug or used a prescription medication for non-medical reasons? Never Filed at: 02/18/2025 1939                            History of Present Illness       Chief Complaint   Patient presents with    Flu Symptoms     Fever, cough congestion co of chest heaviness since last night       Past Medical History:   Diagnosis Date    Anxiety     Arthritis     DVT (deep venous thrombosis) ([facility])     GERD (gastroesophageal reflux disease)     Osteoarthritis 8/23    Patient denies medical problems     PE (pulmonary thromboembolism) ([facility]) 2023    6 mos eliquis      Past Surgical History:   Procedure Laterality Date    CHOLECYSTECTOMY      HYSTERECTOMY N/A 06/06/2023    LA ARTHROSCOPY KNEE W/MENISCUS RPR MEDIAL/LATERAL Right 10/21/2024    Procedure: partial medial meiscectomy and chonroplasty medial femoral condyle;  Surgeon: Ehsan Hammond MD;  Location: [facility];  Service: Orthopedics    TONSILLECTOMY        Family History   Adopted: Yes   Family history unknown: Yes      Social History     Tobacco Use    Smoking status: Former     Current packs/day: 0.00     Average packs/day: 0.3 packs/day for 10.0 years (2.5 ttl pk-yrs)     Types: Cigarettes     Start date: 1/1/2012     Quit date: 1/1/2022     Years since quitting: 3.1    Smokeless tobacco: Never    Tobacco comments:     1  pack a week   Vaping Use    Vaping status: Never Used   Substance Use Topics    Alcohol use: Yes     Alcohol/week: 9.0 standard drinks of alcohol     Types: 6 Cans of beer, 3 Standard drinks or equivalent per week     Comment: Weekends    Drug use: Yes     Frequency: 2.0 times per week     Types: Marijuana     Comment: Recreationally and to fall asleep      E-Cigarette/Vaping    E-Cigarette Use Never User       E-Cigarette/Vaping Substances    Nicotine No     THC No     CBD No     Flavoring No     Other No     Unknown No       I have reviewed and agree with the history as documented.     HPI  See mdm  Review of Systems   Constitutional:  Positive for fever. Negative for chills.   HENT:  Negative for ear pain and sore throat.    Eyes:  Negative for pain and visual disturbance.   Respiratory:  Positive for cough and shortness of breath.    Cardiovascular:  Negative for chest pain and palpitations.   Gastrointestinal:  Negative for abdominal pain and vomiting.   Genitourinary:  Negative for dysuria and hematuria.   Musculoskeletal:  Negative for arthralgias and back pain.   Skin:  Negative for color change and rash.   Neurological:  Negative for seizures and syncope.   All other systems reviewed and are negative.          Objective       ED Triage Vitals [02/18/25 1832]   Temperature Pulse Blood Pressure Respirations SpO2 Patient Position - Orthostatic VS   99.6 °F (37.6 °C) 92 126/83 18 95 % Sitting      Temp Source Heart Rate Source BP Location FiO2 (%) Pain Score    Temporal Monitor Right arm -- 4      Vitals      Date and Time Temp Pulse SpO2 Resp BP Pain Score FACES Pain Rating User   02/18/25 2030 -- 75 97 % 18 100/56 -- -- DD   02/18/25 1945 -- 75 96 % 18 143/76 -- --    02/18/25 1832 99.6 °F (37.6 °C) 92 95 % 18 126/83 4 -- SF            Physical Exam  Vitals and nursing note reviewed.   Constitutional:       General: She is not in acute distress.  HENT:      Head: Normocephalic and atraumatic.      Right  Ear: External ear normal.      Left Ear: External ear normal.      Nose: Nose normal.      Mouth/Throat:      Pharynx: Oropharynx is clear.   Eyes:      Extraocular Movements: Extraocular movements intact.      Pupils: Pupils are equal, round, and reactive to light.   Cardiovascular:      Rate and Rhythm: Normal rate and regular rhythm.      Pulses: Normal pulses.      Heart sounds: Normal heart sounds. No murmur heard.     No friction rub. No gallop.   Pulmonary:      Effort: Pulmonary effort is normal. No respiratory distress.      Breath sounds: Normal breath sounds. No wheezing, rhonchi or rales.   Abdominal:      General: Abdomen is flat. There is no distension.      Palpations: Abdomen is soft.      Tenderness: There is no abdominal tenderness. There is no guarding or rebound.   Musculoskeletal:         General: No deformity. Normal range of motion.      Cervical back: Normal range of motion.      Right lower leg: No edema.      Left lower leg: No edema.   Skin:     General: Skin is warm and dry.      Capillary Refill: Capillary refill takes less than 2 seconds.      Findings: No rash.   Neurological:      General: No focal deficit present.      Mental Status: She is alert and oriented to person, place, and time.      Gait: Gait normal.   Psychiatric:         Mood and Affect: Mood normal.         Results Reviewed       Procedure Component Value Units Date/Time    Procalcitonin [[id number]]  (Normal) Collected: 02/18/25 1932    Lab Status: Final result Specimen: Blood from Arm, Left Updated: 02/18/25 2009     Procalcitonin 0.07 ng/ml     Comprehensive metabolic panel [[id number]] Collected: 02/18/25 1932    Lab Status: Final result Specimen: Blood from Arm, Left Updated: 02/18/25 2000     Sodium 137 mmol/L      Potassium 4.0 mmol/L      Chloride 104 mmol/L      CO2 23 mmol/L      ANION GAP 10 mmol/L      BUN 20 mg/dL      Creatinine 0.91 mg/dL      Glucose 100 mg/dL      Calcium 9.0 mg/dL      AST 19 U/L      ALT  19 U/L      Alkaline Phosphatase 88 U/L      Total Protein 6.6 g/dL      Albumin 4.4 g/dL      Total Bilirubin 0.30 mg/dL      eGFR 71 ml/min/1.73sq m     Narrative:      National Kidney Disease Foundation guidelines for Chronic Kidney Disease (CKD):     Stage 1 with normal or high GFR (GFR > 90 mL/min/1.73 square meters)    Stage 2 Mild CKD (GFR = 60-89 mL/min/1.73 square meters)    Stage 3A Moderate CKD (GFR = 45-59 mL/min/1.73 square meters)    Stage 3B Moderate CKD (GFR = 30-44 mL/min/1.73 square meters)    Stage 4 Severe CKD (GFR = 15-29 mL/min/1.73 square meters)    Stage 5 End Stage CKD (GFR <15 mL/min/1.73 square meters)  Note: GFR calculation is accurate only with a steady state creatinine    CBC and differential [[id number]]  (Abnormal) Collected: 02/18/25 1932    Lab Status: Final result Specimen: Blood from Arm, Left Updated: 02/18/25 1941     WBC 4.58 Thousand/uL      RBC 4.33 Million/uL      Hemoglobin 12.3 g/dL      Hematocrit 37.7 %      MCV 87 fL      MCH 28.4 pg      MCHC 32.6 g/dL      RDW 14.6 %      MPV 10.3 fL      Platelets 230 Thousands/uL      nRBC 0 /100 WBCs      Segmented % 54 %      Immature Grans % 0 %      Lymphocytes % 29 %      Monocytes % 10 %      Eosinophils Relative 5 %      Basophils Relative 2 %      Absolute Neutrophils 2.53 Thousands/µL      Absolute Immature Grans 0.00 Thousand/uL      Absolute Lymphocytes 1.31 Thousands/µL      Absolute Monocytes 0.45 Thousand/µL      Eosinophils Absolute 0.21 Thousand/µL      Basophils Absolute 0.08 Thousands/µL     FLU/COVID Rapid Antigen (30 min. TAT) - Preferred screening test in ED [[id number]]  (Normal) Collected: 02/18/25 1842    Lab Status: Final result Specimen: Nares from Nose Updated: 02/18/25 1903     SARS COV Rapid Antigen Negative     Influenza A Rapid Antigen Negative     Influenza B Rapid Antigen Negative    Narrative:      This test has been performed using the Quidel Jeannie 2 FLU+SARS Antigen test under the Emergency Use  Authorization (EUA). This test has been validated by the  and verified by the performing laboratory. The Jeannie uses lateral flow immunofluorescent sandwich assay to detect SARS-COV, Influenza A and Influenza B Antigen.     The Quidel Jeannie 2 SARS Antigen test does not differentiate between SARS-CoV and SARS-CoV-2.     Negative results are presumptive and may be confirmed with a molecular assay, if necessary, for patient management. Negative results do not rule out SARS-CoV-2 or influenza infection and should not be used as the sole basis for treatment or patient management decisions. A negative test result may occur if the level of antigen in a sample is below the limit of detection of this test.     Positive results are indicative of the presence of viral antigens, but do not rule out bacterial infection or co-infection with other viruses.     All test results should be used as an adjunct to clinical observations and other information available to the provider.    FOR PEDIATRIC PATIENTS - copy/paste COVID Guidelines URL to browser: [email or web address]            XR chest 2 views   ED Interpretation by Getachew Pickett MD (02/18 2014)   No acute cardiopulmonary findings per my interpretation       Final Interpretation by Rosa Rodrigues MD (02/19 0843)      No acute cardiopulmonary disease.            Workstation performed: [id number]             Procedures    ED Medication and Procedure Management   Prior to Admission Medications   Prescriptions Last Dose Informant Patient Reported? Taking?   Turmeric, Curcuma Longa, (Turmeric Root) POWD  Self Yes No   clonazePAM (KlonoPIN) 0.5 mg tablet   No No   Sig: Take 1 tablet (0.5 mg total) by mouth 2 (two) times a day as needed for anxiety   methylPREDNISolone 4 MG tablet therapy pack   No No   Sig: Use as directed on package   omeprazole (PriLOSEC) 20 mg delayed release capsule   No No   Sig: Take 1  capsule (20 mg total) by mouth daily      Facility-Administered Medications: None     Discharge Medication List as of 2/18/2025  8:19 PM        START taking these medications    Details   benzonatate (TESSALON PERLES) 100 mg capsule Take 1 capsule (100 mg total) by mouth 3 (three) times a day as needed for cough, Starting Tue 2/18/2025, Normal      dextromethorphan-guaifenesin (MUCINEX DM)  MG per 12 hr tablet Take 1 tablet by mouth every 12 (twelve) hours, Starting Tue 2/18/2025, Normal           CONTINUE these medications which have NOT CHANGED    Details   clonazePAM (KlonoPIN) 0.5 mg tablet Take 1 tablet (0.5 mg total) by mouth 2 (two) times a day as needed for anxiety, Starting Thu 12/5/2024, Normal      methylPREDNISolone 4 MG tablet therapy pack Use as directed on package, Normal      omeprazole (PriLOSEC) 20 mg delayed release capsule Take 1 capsule (20 mg total) by mouth daily, Starting Thu 12/5/2024, Normal      Turmeric, Curcuma Longa, (Turmeric Root) POWD Historical Med           No discharge procedures on file.  ED SEPSIS DOCUMENTATION   Time reflects when diagnosis was documented in both MDM as applicable and the Disposition within this note       Time User Action Codes Description Comment    2/18/2025  8:18 PM Getachew Pickett Add [J06.9] Viral URI with cough                  Getachew Pickett MD  02/20/25 0054

## 2025-04-21 DIAGNOSIS — F41.9 ANXIETY: ICD-10-CM

## 2025-04-21 RX ORDER — CLONAZEPAM 0.5 MG/1
0.5 TABLET ORAL 2 TIMES DAILY PRN
Qty: 30 TABLET | Refills: 0 | Status: SHIPPED | OUTPATIENT
Start: 2025-04-21

## 2025-04-21 NOTE — TELEPHONE ENCOUNTER
Reason for call:   [x] Refill   [] Prior Auth  [] Other:     Office:   [x] PCP/Provider - Tasha Bolton MD   [] Specialty/Provider -     Medication:     clonazePAM (KlonoPIN) 0.5 mg tablet       Dose/Frequency:     0.5 mg, Oral, 2 times daily PRN       Quantity: 30    Pharmacy: [address] [place] [address] Pharmacy   Does the patient have enough for 3 days?   [] Yes   [x] No - Send as HP to POD    Mail Away Pharmacy   Does the patient have enough for 10 days?   [] Yes   [] No - Send as HP to POD

## 2025-07-07 DIAGNOSIS — F41.9 ANXIETY: ICD-10-CM

## 2025-07-08 RX ORDER — CLONAZEPAM 0.5 MG/1
0.5 TABLET ORAL 2 TIMES DAILY PRN
Qty: 30 TABLET | Refills: 0 | Status: SHIPPED | OUTPATIENT
Start: 2025-07-08

## 2025-07-14 ENCOUNTER — RA CDI HCC (OUTPATIENT)
Dept: OTHER | Facility: HOSPITAL | Age: 55
End: 2025-07-14

## 2025-07-14 NOTE — PROGRESS NOTES
HCC coding opportunities       Chart reviewed, no opportunity found: CHART REVIEWED, NO OPPORTUNITY FOUND        Patients Insurance        Commercial Insurance: [employer]

## 2025-07-21 ENCOUNTER — OFFICE VISIT (OUTPATIENT)
Dept: FAMILY MEDICINE CLINIC | Facility: CLINIC | Age: 55
End: 2025-07-21
Payer: COMMERCIAL

## 2025-07-21 VITALS
BODY MASS INDEX: 28.79 KG/M2 | DIASTOLIC BLOOD PRESSURE: 82 MMHG | HEIGHT: 65 IN | HEART RATE: 76 BPM | SYSTOLIC BLOOD PRESSURE: 104 MMHG | RESPIRATION RATE: 14 BRPM | WEIGHT: 172.8 LBS | OXYGEN SATURATION: 94 % | TEMPERATURE: 97.9 F

## 2025-07-21 DIAGNOSIS — Z13.0 SCREENING FOR DEFICIENCY ANEMIA: ICD-10-CM

## 2025-07-21 DIAGNOSIS — Z86.711 HISTORY OF PULMONARY EMBOLISM: ICD-10-CM

## 2025-07-21 DIAGNOSIS — F41.9 ANXIETY AND DEPRESSION: ICD-10-CM

## 2025-07-21 DIAGNOSIS — R73.03 PREDIABETES: ICD-10-CM

## 2025-07-21 DIAGNOSIS — Z13.220 SCREENING FOR LIPID DISORDERS: ICD-10-CM

## 2025-07-21 DIAGNOSIS — R76.8 POSITIVE ANA (ANTINUCLEAR ANTIBODY): ICD-10-CM

## 2025-07-21 DIAGNOSIS — Z90.710 S/P HYSTERECTOMY: ICD-10-CM

## 2025-07-21 DIAGNOSIS — K21.9 GASTROESOPHAGEAL REFLUX DISEASE WITHOUT ESOPHAGITIS: ICD-10-CM

## 2025-07-21 DIAGNOSIS — Z13.29 SCREENING FOR THYROID DISORDER: ICD-10-CM

## 2025-07-21 DIAGNOSIS — M54.9 BACKACHE SYMPTOM: ICD-10-CM

## 2025-07-21 DIAGNOSIS — Z11.59 NEED FOR HEPATITIS C SCREENING TEST: ICD-10-CM

## 2025-07-21 DIAGNOSIS — Z11.4 SCREENING FOR HIV (HUMAN IMMUNODEFICIENCY VIRUS): ICD-10-CM

## 2025-07-21 DIAGNOSIS — Z86.718 HISTORY OF DVT (DEEP VEIN THROMBOSIS): ICD-10-CM

## 2025-07-21 DIAGNOSIS — Z00.00 ANNUAL PHYSICAL EXAM: Primary | ICD-10-CM

## 2025-07-21 DIAGNOSIS — F32.A ANXIETY AND DEPRESSION: ICD-10-CM

## 2025-07-21 PROBLEM — F32.2 SEVERE MAJOR DEPRESSIVE DISORDER (HCC): Status: ACTIVE | Noted: 2025-07-21

## 2025-07-21 LAB
SL AMB  POCT GLUCOSE, UA: NORMAL
SL AMB LEUKOCYTE ESTERASE,UA: NORMAL
SL AMB POCT BILIRUBIN,UA: NORMAL
SL AMB POCT BLOOD,UA: NORMAL
SL AMB POCT CLARITY,UA: CLEAR
SL AMB POCT COLOR,UA: YELLOW
SL AMB POCT HEMOGLOBIN AIC: 5.4 (ref ?–6.5)
SL AMB POCT KETONES,UA: NORMAL
SL AMB POCT NITRITE,UA: NORMAL
SL AMB POCT PH,UA: 7
SL AMB POCT SPECIFIC GRAVITY,UA: 1.01
SL AMB POCT URINE PROTEIN: NORMAL
SL AMB POCT UROBILINOGEN: NORMAL

## 2025-07-21 PROCEDURE — 81003 URINALYSIS AUTO W/O SCOPE: CPT | Performed by: FAMILY MEDICINE

## 2025-07-21 PROCEDURE — 99173 VISUAL ACUITY SCREEN: CPT | Performed by: FAMILY MEDICINE

## 2025-07-21 PROCEDURE — 99396 PREV VISIT EST AGE 40-64: CPT | Performed by: FAMILY MEDICINE

## 2025-07-21 PROCEDURE — 83036 HEMOGLOBIN GLYCOSYLATED A1C: CPT | Performed by: FAMILY MEDICINE

## 2025-07-22 ENCOUNTER — TELEPHONE (OUTPATIENT)
Dept: ADMINISTRATIVE | Facility: OTHER | Age: 55
End: 2025-07-22

## (undated) DEVICE — GLOVE INDICATOR PI UNDERGLOVE SZ 6.5 BLUE

## (undated) DEVICE — GLOVE INDICATOR PI UNDERGLOVE SZ 8 BLUE

## (undated) DEVICE — TIBURON EXTREMITY SHEET: Brand: CONVERTORS

## (undated) DEVICE — ASTOUND SURGICAL GOWN, XXX LARGE, X-LONG: Brand: CONVERTORS

## (undated) DEVICE — INTENDED FOR TISSUE SEPARATION, AND OTHER PROCEDURES THAT REQUIRE A SHARP SURGICAL BLADE TO PUNCTURE OR CUT.: Brand: BARD-PARKER ® CARBON RIB-BACK BLADES

## (undated) DEVICE — WEBRIL 6 IN UNSTERILE

## (undated) DEVICE — FOOT SWITCH DRAPE: Brand: UNBRANDED

## (undated) DEVICE — OCCLUSIVE GAUZE STRIP,3% BISMUTH TRIBROMOPHENATE IN PETROLATUM BLEND: Brand: XEROFORM

## (undated) DEVICE — BLADE SHAVER TORPEDO 4MM 13CM  COOLCUT

## (undated) DEVICE — TUBING ARTHROSCOPIC WAVE  MAIN PUMP

## (undated) DEVICE — GLOVE SRG BIOGEL ORTHOPEDIC 8

## (undated) DEVICE — SPONGE GAUZE 4 X 8 12 PLY STRL LF

## (undated) DEVICE — 1016 S-DRAPE IRRIG POUCH 10/BOX: Brand: STERI-DRAPE™

## (undated) DEVICE — PACK ARTHROSCOPY

## (undated) DEVICE — TOWEL SET X-RAY

## (undated) DEVICE — CUFF TOURNIQUET 34 X 4 IN QUICK CONNECT DISP 1BLA

## (undated) DEVICE — DUAL SPIKE ADAPTER: Brand: CONMED

## (undated) DEVICE — CHLORAPREP HI-LITE 26ML ORANGE

## (undated) DEVICE — MAT ABSORBANT ARTHROSCOPY FLOOR 46 X 40 IN

## (undated) DEVICE — U-DRAPE: Brand: CONVERTORS

## (undated) DEVICE — GLOVE SRG BIOGEL 6.5

## (undated) DEVICE — TUBING SUCTION 5MM X 12 FT

## (undated) DEVICE — BANDAGE, ESMARK LF STR 6"X9' (20/CS): Brand: CYPRESS

## (undated) DEVICE — BLADE SHAVER TORPEDO CRV 4MM 13MM COOLCUT

## (undated) DEVICE — ACE WRAP 6 IN UNSTERILE